# Patient Record
Sex: FEMALE | Race: WHITE | NOT HISPANIC OR LATINO | URBAN - METROPOLITAN AREA
[De-identification: names, ages, dates, MRNs, and addresses within clinical notes are randomized per-mention and may not be internally consistent; named-entity substitution may affect disease eponyms.]

---

## 2019-06-12 ENCOUNTER — OUTPATIENT (OUTPATIENT)
Dept: OUTPATIENT SERVICES | Facility: HOSPITAL | Age: 79
LOS: 1 days | End: 2019-06-12
Payer: MEDICARE

## 2019-06-12 VITALS
DIASTOLIC BLOOD PRESSURE: 72 MMHG | SYSTOLIC BLOOD PRESSURE: 140 MMHG | HEART RATE: 77 BPM | OXYGEN SATURATION: 98 % | RESPIRATION RATE: 16 BRPM | TEMPERATURE: 99 F | HEIGHT: 59 IN | WEIGHT: 117.95 LBS

## 2019-06-12 DIAGNOSIS — M19.90 UNSPECIFIED OSTEOARTHRITIS, UNSPECIFIED SITE: ICD-10-CM

## 2019-06-12 DIAGNOSIS — M18.11 UNILATERAL PRIMARY OSTEOARTHRITIS OF FIRST CARPOMETACARPAL JOINT, RIGHT HAND: ICD-10-CM

## 2019-06-12 DIAGNOSIS — D21.9 BENIGN NEOPLASM OF CONNECTIVE AND OTHER SOFT TISSUE, UNSPECIFIED: Chronic | ICD-10-CM

## 2019-06-12 LAB
ANION GAP SERPL CALC-SCNC: 12 MMO/L — SIGNIFICANT CHANGE UP (ref 7–14)
BUN SERPL-MCNC: 21 MG/DL — SIGNIFICANT CHANGE UP (ref 7–23)
CALCIUM SERPL-MCNC: 9.5 MG/DL — SIGNIFICANT CHANGE UP (ref 8.4–10.5)
CHLORIDE SERPL-SCNC: 104 MMOL/L — SIGNIFICANT CHANGE UP (ref 98–107)
CO2 SERPL-SCNC: 26 MMOL/L — SIGNIFICANT CHANGE UP (ref 22–31)
CREAT SERPL-MCNC: 0.62 MG/DL — SIGNIFICANT CHANGE UP (ref 0.5–1.3)
GLUCOSE SERPL-MCNC: 97 MG/DL — SIGNIFICANT CHANGE UP (ref 70–99)
HCT VFR BLD CALC: 42.7 % — SIGNIFICANT CHANGE UP (ref 34.5–45)
HGB BLD-MCNC: 14.2 G/DL — SIGNIFICANT CHANGE UP (ref 11.5–15.5)
MCHC RBC-ENTMCNC: 28.7 PG — SIGNIFICANT CHANGE UP (ref 27–34)
MCHC RBC-ENTMCNC: 33.3 % — SIGNIFICANT CHANGE UP (ref 32–36)
MCV RBC AUTO: 86.4 FL — SIGNIFICANT CHANGE UP (ref 80–100)
NRBC # FLD: 0 K/UL — SIGNIFICANT CHANGE UP (ref 0–0)
PLATELET # BLD AUTO: 255 K/UL — SIGNIFICANT CHANGE UP (ref 150–400)
PMV BLD: 10.6 FL — SIGNIFICANT CHANGE UP (ref 7–13)
POTASSIUM SERPL-MCNC: 4.3 MMOL/L — SIGNIFICANT CHANGE UP (ref 3.5–5.3)
POTASSIUM SERPL-SCNC: 4.3 MMOL/L — SIGNIFICANT CHANGE UP (ref 3.5–5.3)
RBC # BLD: 4.94 M/UL — SIGNIFICANT CHANGE UP (ref 3.8–5.2)
RBC # FLD: 13.4 % — SIGNIFICANT CHANGE UP (ref 10.3–14.5)
SODIUM SERPL-SCNC: 142 MMOL/L — SIGNIFICANT CHANGE UP (ref 135–145)
WBC # BLD: 6.71 K/UL — SIGNIFICANT CHANGE UP (ref 3.8–10.5)
WBC # FLD AUTO: 6.71 K/UL — SIGNIFICANT CHANGE UP (ref 3.8–10.5)

## 2019-06-12 PROCEDURE — 93010 ELECTROCARDIOGRAM REPORT: CPT

## 2019-06-12 NOTE — H&P PST ADULT - NSICDXPASTMEDICALHX_GEN_ALL_CORE_FT
PAST MEDICAL HISTORY:  Hearing loss bilateral -- wears aids    OA (osteoarthritis) first carpometacarpal joint of right hand PAST MEDICAL HISTORY:  Hearing loss bilateral -- wears aids    OA (osteoarthritis) first carpometacarpal joint of right hand    Overactive bladder

## 2019-06-12 NOTE — H&P PST ADULT - NSICDXPROBLEM_GEN_ALL_CORE_FT
PROBLEM DIAGNOSES  Problem: Osteoarthritis, chronic  Assessment and Plan: This is an 80 y/o female who is scheduled for right hand basal joint arthroplasty and scaphotrapeziotrapezoid debridement on 6-24-19  * Given pre op and cleanser instructions with good feed back and patient verbalized understanding  * Await medical evaluation with pcp as requested by surgeon PROBLEM DIAGNOSES  Problem: Osteoarthritis, chronic  Assessment and Plan: This is an 78 y/o female who is scheduled for right hand basal joint arthroplasty and scaphotrapeziotrapezoid debridement on 6-24-19  * Given pre op and cleanser instructions with good feed back and patient verbalized understanding  * Await medical evaluation with pcp as requested by surgeon  * Last aspirin on 6-17-19  * Instructed to take normal am dose of Vesicare the am of surgery

## 2019-06-12 NOTE — H&P PST ADULT - HISTORY OF PRESENT ILLNESS
This is an 80 y/o female who presents with This is an 80 y/o female who presents with progressively worsening symptomatology of OA of the right thumb confirmed on xrays. Scheduled for right hand basal joint arthroplasty and scaphotrapeziotrapezoid debridement on 6-24-19

## 2019-06-23 ENCOUNTER — TRANSCRIPTION ENCOUNTER (OUTPATIENT)
Age: 79
End: 2019-06-23

## 2019-06-24 ENCOUNTER — OUTPATIENT (OUTPATIENT)
Dept: OUTPATIENT SERVICES | Facility: HOSPITAL | Age: 79
LOS: 1 days | Discharge: HOME | End: 2019-06-24

## 2019-06-24 VITALS
SYSTOLIC BLOOD PRESSURE: 111 MMHG | TEMPERATURE: 98 F | DIASTOLIC BLOOD PRESSURE: 80 MMHG | HEART RATE: 74 BPM | OXYGEN SATURATION: 97 %

## 2019-06-24 VITALS
WEIGHT: 117.95 LBS | SYSTOLIC BLOOD PRESSURE: 134 MMHG | RESPIRATION RATE: 20 BRPM | OXYGEN SATURATION: 100 % | HEART RATE: 78 BPM | TEMPERATURE: 99 F | DIASTOLIC BLOOD PRESSURE: 71 MMHG | HEIGHT: 59 IN

## 2019-06-24 DIAGNOSIS — M18.11 UNILATERAL PRIMARY OSTEOARTHRITIS OF FIRST CARPOMETACARPAL JOINT, RIGHT HAND: ICD-10-CM

## 2019-06-24 DIAGNOSIS — D21.9 BENIGN NEOPLASM OF CONNECTIVE AND OTHER SOFT TISSUE, UNSPECIFIED: Chronic | ICD-10-CM

## 2019-06-24 RX ORDER — ONDANSETRON 8 MG/1
1 TABLET, FILM COATED ORAL
Qty: 10 | Refills: 0
Start: 2019-06-24

## 2019-06-24 RX ORDER — SODIUM CHLORIDE 9 MG/ML
1000 INJECTION, SOLUTION INTRAVENOUS
Refills: 0 | Status: DISCONTINUED | OUTPATIENT
Start: 2019-06-24 | End: 2019-07-09

## 2019-06-24 NOTE — ASU DISCHARGE PLAN (ADULT/PEDIATRIC) - CALL YOUR DOCTOR IF YOU HAVE ANY OF THE FOLLOWING:
Pain not relieved by Medications/Numbness, tingling, color or temperature change to extremity/Swelling that gets worse/Fever greater than (need to indicate Fahrenheit or Celsius)

## 2019-06-24 NOTE — ASU DISCHARGE PLAN (ADULT/PEDIATRIC) - NURSING INSTRUCTIONS
No fried, spicy or greasy foods. Increase fluids as tolerated  If your doctor prescribed narcotic pain medications after your procedure to help prevent and reduce constipation, increase fluid intake and fiber intake in your diet. You may take OTC Stool Softeners such as Colace to help reduce constipation.      refer to Dr. Morataya instruction sheet

## 2019-10-15 NOTE — H&P PST ADULT - NSANTHOSAYNRD_GEN_A_CORE
loss
No. JAZ screening performed.  STOP BANG Legend: 0-2 = LOW Risk; 3-4 = INTERMEDIATE Risk; 5-8 = HIGH Risk

## 2020-10-17 ENCOUNTER — TRANSCRIPTION ENCOUNTER (OUTPATIENT)
Age: 80
End: 2020-10-17

## 2020-10-17 ENCOUNTER — INPATIENT (INPATIENT)
Facility: HOSPITAL | Age: 80
LOS: 3 days | Discharge: NURSING, REHAB OR LONG-TERM CARE | End: 2020-10-21
Attending: ORTHOPAEDIC SURGERY | Admitting: ORTHOPAEDIC SURGERY
Payer: MEDICARE

## 2020-10-17 VITALS
HEIGHT: 59 IN | SYSTOLIC BLOOD PRESSURE: 160 MMHG | HEART RATE: 80 BPM | TEMPERATURE: 97 F | DIASTOLIC BLOOD PRESSURE: 78 MMHG | RESPIRATION RATE: 16 BRPM | OXYGEN SATURATION: 100 %

## 2020-10-17 DIAGNOSIS — S72.141A DISPLACED INTERTROCHANTERIC FRACTURE OF RIGHT FEMUR, INITIAL ENCOUNTER FOR CLOSED FRACTURE: ICD-10-CM

## 2020-10-17 DIAGNOSIS — Z01.818 ENCOUNTER FOR OTHER PREPROCEDURAL EXAMINATION: ICD-10-CM

## 2020-10-17 DIAGNOSIS — S72.143A DISPLACED INTERTROCHANTERIC FRACTURE OF UNSPECIFIED FEMUR, INITIAL ENCOUNTER FOR CLOSED FRACTURE: ICD-10-CM

## 2020-10-17 DIAGNOSIS — N32.81 OVERACTIVE BLADDER: ICD-10-CM

## 2020-10-17 DIAGNOSIS — E86.0 DEHYDRATION: ICD-10-CM

## 2020-10-17 DIAGNOSIS — M19.90 UNSPECIFIED OSTEOARTHRITIS, UNSPECIFIED SITE: ICD-10-CM

## 2020-10-17 DIAGNOSIS — D21.9 BENIGN NEOPLASM OF CONNECTIVE AND OTHER SOFT TISSUE, UNSPECIFIED: Chronic | ICD-10-CM

## 2020-10-17 PROBLEM — H91.90 UNSPECIFIED HEARING LOSS, UNSPECIFIED EAR: Chronic | Status: ACTIVE | Noted: 2019-06-12

## 2020-10-17 LAB
ALBUMIN SERPL ELPH-MCNC: 4.6 G/DL — SIGNIFICANT CHANGE UP (ref 3.3–5)
ALP SERPL-CCNC: 112 U/L — SIGNIFICANT CHANGE UP (ref 40–120)
ALT FLD-CCNC: 23 U/L — SIGNIFICANT CHANGE UP (ref 4–33)
ANION GAP SERPL CALC-SCNC: 15 MMO/L — HIGH (ref 7–14)
APPEARANCE UR: CLEAR — SIGNIFICANT CHANGE UP
APTT BLD: 27.5 SEC — SIGNIFICANT CHANGE UP (ref 27–36.3)
AST SERPL-CCNC: 24 U/L — SIGNIFICANT CHANGE UP (ref 4–32)
BASOPHILS # BLD AUTO: 0.04 K/UL — SIGNIFICANT CHANGE UP (ref 0–0.2)
BASOPHILS NFR BLD AUTO: 0.3 % — SIGNIFICANT CHANGE UP (ref 0–2)
BILIRUB SERPL-MCNC: 0.6 MG/DL — SIGNIFICANT CHANGE UP (ref 0.2–1.2)
BILIRUB UR-MCNC: NEGATIVE — SIGNIFICANT CHANGE UP
BLD GP AB SCN SERPL QL: NEGATIVE — SIGNIFICANT CHANGE UP
BLOOD UR QL VISUAL: NEGATIVE — SIGNIFICANT CHANGE UP
BUN SERPL-MCNC: 27 MG/DL — HIGH (ref 7–23)
CALCIUM SERPL-MCNC: 8.9 MG/DL — SIGNIFICANT CHANGE UP (ref 8.4–10.5)
CHLORIDE SERPL-SCNC: 103 MMOL/L — SIGNIFICANT CHANGE UP (ref 98–107)
CO2 SERPL-SCNC: 23 MMOL/L — SIGNIFICANT CHANGE UP (ref 22–31)
COLOR SPEC: SIGNIFICANT CHANGE UP
CREAT SERPL-MCNC: 0.67 MG/DL — SIGNIFICANT CHANGE UP (ref 0.5–1.3)
EOSINOPHIL # BLD AUTO: 0.06 K/UL — SIGNIFICANT CHANGE UP (ref 0–0.5)
EOSINOPHIL NFR BLD AUTO: 0.5 % — SIGNIFICANT CHANGE UP (ref 0–6)
GLUCOSE SERPL-MCNC: 155 MG/DL — HIGH (ref 70–99)
GLUCOSE UR-MCNC: NEGATIVE — SIGNIFICANT CHANGE UP
HCT VFR BLD CALC: 45.6 % — HIGH (ref 34.5–45)
HGB BLD-MCNC: 15.6 G/DL — HIGH (ref 11.5–15.5)
IMM GRANULOCYTES NFR BLD AUTO: 1.6 % — HIGH (ref 0–1.5)
INR BLD: 1.01 — SIGNIFICANT CHANGE UP (ref 0.88–1.16)
KETONES UR-MCNC: SIGNIFICANT CHANGE UP
LEUKOCYTE ESTERASE UR-ACNC: NEGATIVE — SIGNIFICANT CHANGE UP
LYMPHOCYTES # BLD AUTO: 1.58 K/UL — SIGNIFICANT CHANGE UP (ref 1–3.3)
LYMPHOCYTES # BLD AUTO: 13 % — SIGNIFICANT CHANGE UP (ref 13–44)
MCHC RBC-ENTMCNC: 30.1 PG — SIGNIFICANT CHANGE UP (ref 27–34)
MCHC RBC-ENTMCNC: 34.2 % — SIGNIFICANT CHANGE UP (ref 32–36)
MCV RBC AUTO: 87.9 FL — SIGNIFICANT CHANGE UP (ref 80–100)
MONOCYTES # BLD AUTO: 0.45 K/UL — SIGNIFICANT CHANGE UP (ref 0–0.9)
MONOCYTES NFR BLD AUTO: 3.7 % — SIGNIFICANT CHANGE UP (ref 2–14)
NEUTROPHILS # BLD AUTO: 9.8 K/UL — HIGH (ref 1.8–7.4)
NEUTROPHILS NFR BLD AUTO: 80.9 % — HIGH (ref 43–77)
NITRITE UR-MCNC: NEGATIVE — SIGNIFICANT CHANGE UP
NRBC # FLD: 0 K/UL — SIGNIFICANT CHANGE UP (ref 0–0)
PH UR: 6 — SIGNIFICANT CHANGE UP (ref 5–8)
PLATELET # BLD AUTO: 237 K/UL — SIGNIFICANT CHANGE UP (ref 150–400)
PMV BLD: 10.5 FL — SIGNIFICANT CHANGE UP (ref 7–13)
POTASSIUM SERPL-MCNC: 4 MMOL/L — SIGNIFICANT CHANGE UP (ref 3.5–5.3)
POTASSIUM SERPL-SCNC: 4 MMOL/L — SIGNIFICANT CHANGE UP (ref 3.5–5.3)
PROT SERPL-MCNC: 6.8 G/DL — SIGNIFICANT CHANGE UP (ref 6–8.3)
PROT UR-MCNC: NEGATIVE — SIGNIFICANT CHANGE UP
PROTHROM AB SERPL-ACNC: 11.6 SEC — SIGNIFICANT CHANGE UP (ref 10.6–13.6)
RBC # BLD: 5.19 M/UL — SIGNIFICANT CHANGE UP (ref 3.8–5.2)
RBC # FLD: 13.1 % — SIGNIFICANT CHANGE UP (ref 10.3–14.5)
RH IG SCN BLD-IMP: POSITIVE — SIGNIFICANT CHANGE UP
SARS-COV-2 RNA SPEC QL NAA+PROBE: SIGNIFICANT CHANGE UP
SODIUM SERPL-SCNC: 141 MMOL/L — SIGNIFICANT CHANGE UP (ref 135–145)
SP GR SPEC: 1.02 — SIGNIFICANT CHANGE UP (ref 1–1.04)
UROBILINOGEN FLD QL: NORMAL — SIGNIFICANT CHANGE UP
WBC # BLD: 12.13 K/UL — HIGH (ref 3.8–10.5)
WBC # FLD AUTO: 12.13 K/UL — HIGH (ref 3.8–10.5)

## 2020-10-17 PROCEDURE — 71045 X-RAY EXAM CHEST 1 VIEW: CPT | Mod: 26

## 2020-10-17 PROCEDURE — 73560 X-RAY EXAM OF KNEE 1 OR 2: CPT | Mod: 26,RT

## 2020-10-17 PROCEDURE — 73502 X-RAY EXAM HIP UNI 2-3 VIEWS: CPT | Mod: 26,RT

## 2020-10-17 PROCEDURE — 99223 1ST HOSP IP/OBS HIGH 75: CPT

## 2020-10-17 PROCEDURE — 73501 X-RAY EXAM HIP UNI 1 VIEW: CPT | Mod: 26,59,RT

## 2020-10-17 PROCEDURE — 99284 EMERGENCY DEPT VISIT MOD MDM: CPT

## 2020-10-17 PROCEDURE — 73552 X-RAY EXAM OF FEMUR 2/>: CPT | Mod: 26,RT

## 2020-10-17 RX ORDER — ATORVASTATIN CALCIUM 80 MG/1
40 TABLET, FILM COATED ORAL AT BEDTIME
Refills: 0 | Status: DISCONTINUED | OUTPATIENT
Start: 2020-10-17 | End: 2020-10-21

## 2020-10-17 RX ORDER — MORPHINE SULFATE 50 MG/1
4 CAPSULE, EXTENDED RELEASE ORAL ONCE
Refills: 0 | Status: DISCONTINUED | OUTPATIENT
Start: 2020-10-17 | End: 2020-10-17

## 2020-10-17 RX ORDER — HEPARIN SODIUM 5000 [USP'U]/ML
5000 INJECTION INTRAVENOUS; SUBCUTANEOUS ONCE
Refills: 0 | Status: COMPLETED | OUTPATIENT
Start: 2020-10-17 | End: 2020-10-17

## 2020-10-17 RX ORDER — OXYCODONE HYDROCHLORIDE 5 MG/1
2.5 TABLET ORAL EVERY 4 HOURS
Refills: 0 | Status: DISCONTINUED | OUTPATIENT
Start: 2020-10-17 | End: 2020-10-21

## 2020-10-17 RX ORDER — OXYCODONE HYDROCHLORIDE 5 MG/1
5 TABLET ORAL EVERY 4 HOURS
Refills: 0 | Status: DISCONTINUED | OUTPATIENT
Start: 2020-10-17 | End: 2020-10-21

## 2020-10-17 RX ORDER — SENNA PLUS 8.6 MG/1
2 TABLET ORAL AT BEDTIME
Refills: 0 | Status: DISCONTINUED | OUTPATIENT
Start: 2020-10-17 | End: 2020-10-21

## 2020-10-17 RX ORDER — PANTOPRAZOLE SODIUM 20 MG/1
40 TABLET, DELAYED RELEASE ORAL
Refills: 0 | Status: DISCONTINUED | OUTPATIENT
Start: 2020-10-17 | End: 2020-10-21

## 2020-10-17 RX ORDER — ACETAMINOPHEN 500 MG
975 TABLET ORAL EVERY 8 HOURS
Refills: 0 | Status: DISCONTINUED | OUTPATIENT
Start: 2020-10-17 | End: 2020-10-21

## 2020-10-17 RX ORDER — SODIUM CHLORIDE 9 MG/ML
1000 INJECTION INTRAMUSCULAR; INTRAVENOUS; SUBCUTANEOUS
Refills: 0 | Status: DISCONTINUED | OUTPATIENT
Start: 2020-10-17 | End: 2020-10-18

## 2020-10-17 RX ORDER — HEPARIN SODIUM 5000 [USP'U]/ML
5000 INJECTION INTRAVENOUS; SUBCUTANEOUS ONCE
Refills: 0 | Status: DISCONTINUED | OUTPATIENT
Start: 2020-10-17 | End: 2020-10-17

## 2020-10-17 RX ORDER — TETANUS TOXOID, REDUCED DIPHTHERIA TOXOID AND ACELLULAR PERTUSSIS VACCINE, ADSORBED 5; 2.5; 8; 8; 2.5 [IU]/.5ML; [IU]/.5ML; UG/.5ML; UG/.5ML; UG/.5ML
0.5 SUSPENSION INTRAMUSCULAR ONCE
Refills: 0 | Status: COMPLETED | OUTPATIENT
Start: 2020-10-17 | End: 2020-10-17

## 2020-10-17 RX ORDER — OXYBUTYNIN CHLORIDE 5 MG
5 TABLET ORAL
Refills: 0 | Status: DISCONTINUED | OUTPATIENT
Start: 2020-10-17 | End: 2020-10-18

## 2020-10-17 RX ORDER — MAGNESIUM HYDROXIDE 400 MG/1
30 TABLET, CHEWABLE ORAL DAILY
Refills: 0 | Status: DISCONTINUED | OUTPATIENT
Start: 2020-10-17 | End: 2020-10-21

## 2020-10-17 RX ORDER — HYDROMORPHONE HYDROCHLORIDE 2 MG/ML
0.5 INJECTION INTRAMUSCULAR; INTRAVENOUS; SUBCUTANEOUS ONCE
Refills: 0 | Status: DISCONTINUED | OUTPATIENT
Start: 2020-10-17 | End: 2020-10-17

## 2020-10-17 RX ADMIN — HEPARIN SODIUM 5000 UNIT(S): 5000 INJECTION INTRAVENOUS; SUBCUTANEOUS at 22:41

## 2020-10-17 RX ADMIN — SENNA PLUS 2 TABLET(S): 8.6 TABLET ORAL at 22:41

## 2020-10-17 RX ADMIN — TETANUS TOXOID, REDUCED DIPHTHERIA TOXOID AND ACELLULAR PERTUSSIS VACCINE, ADSORBED 0.5 MILLILITER(S): 5; 2.5; 8; 8; 2.5 SUSPENSION INTRAMUSCULAR at 17:44

## 2020-10-17 RX ADMIN — OXYCODONE HYDROCHLORIDE 2.5 MILLIGRAM(S): 5 TABLET ORAL at 21:04

## 2020-10-17 RX ADMIN — Medication 975 MILLIGRAM(S): at 22:42

## 2020-10-17 RX ADMIN — SODIUM CHLORIDE 125 MILLILITER(S): 9 INJECTION INTRAMUSCULAR; INTRAVENOUS; SUBCUTANEOUS at 22:41

## 2020-10-17 RX ADMIN — ATORVASTATIN CALCIUM 40 MILLIGRAM(S): 80 TABLET, FILM COATED ORAL at 22:42

## 2020-10-17 RX ADMIN — MORPHINE SULFATE 4 MILLIGRAM(S): 50 CAPSULE, EXTENDED RELEASE ORAL at 17:30

## 2020-10-17 RX ADMIN — HYDROMORPHONE HYDROCHLORIDE 0.5 MILLIGRAM(S): 2 INJECTION INTRAMUSCULAR; INTRAVENOUS; SUBCUTANEOUS at 23:49

## 2020-10-17 NOTE — CONSULT NOTE ADULT - PROBLEM SELECTOR RECOMMENDATION 4
- with surgery to first carpometacarpal joint of right hand  - on Vitamin D supplement - with surgery to first carpometacarpal joint of right hand  - x-rays demonstrating osteopenia  - on MVI, Vitamin D supplements  - could give calcium supplement  - fall precautions - WBC = 12.13  - no other signs/symptoms suggestive of infection  - likely due to stress demargination  - f/u trend w/ repeat lab-work

## 2020-10-17 NOTE — ED ADULT TRIAGE NOTE - CHIEF COMPLAINT QUOTE
mechanical trip and fall at front of house with fall to right side and hip. Non-ambulatory at scene. Denies CP/SOB. Denies blood thinner use. Hx Denies Med hx mechanical trip and fall at front of house with fall to right side and hip. Non-ambulatory at scene. Denies CP/SOB. Denies blood thinner use. Shortening observed to right leg.  Hx Denies Med hx

## 2020-10-17 NOTE — ED PROVIDER NOTE - PROGRESS NOTE DETAILS
Donte GARNETT (PGY-2): Patient signed out to me, pending Ortho to see, patient's pain well controlled, will continue to monitor, COVID sent.

## 2020-10-17 NOTE — CONSULT NOTE ADULT - PROBLEM SELECTOR RECOMMENDATION 3
- BUN/Cr = 27/0.67; ratio of 40:1  - also with "small" ketonuria  - ensure optimal hydration; in progress  - f/u repeat lab-work - BUN/Cr = 27/0.67; ratio of 40:1  - also with "small" ketonuria and lab-work appears hemoconcentrated  - ensure optimal hydration; in progress  - f/u repeat lab-work

## 2020-10-17 NOTE — CONSULT NOTE ADULT - ASSESSMENT
[ x ]  Lab studies reviewed  [ x ]  Radiology reviewed  [  ]  Old records reviewed    Medical evaluation of this 80 year old female, with past history as above, who is being scheduled for surgical intervention of the right intertrochanteric fracture. [ x ]  Lab studies reviewed  [ x ]  Radiology reviewed  [ x ]  Old records reviewed    Medical evaluation of this 80 year old female, with past history as above, who is being scheduled for surgical intervention of the right intertrochanteric fracture.

## 2020-10-17 NOTE — ED ADULT NURSE REASSESSMENT NOTE - NS ED NURSE REASSESS COMMENT FT1
14 Tajik Boudreaux placed using sterile technique. Pt tolerated, well, urine draining to collection bag. Pt received pain medication as ordered. Pt is hard of hearing.

## 2020-10-17 NOTE — H&P ADULT - HISTORY OF PRESENT ILLNESS
80y Female presents to [facility] ED s/p mech fall while leaving her house today.  She immediately c/o severe R hip pain and inability to ambulate.  Patient denies headstrike or LOC. Localizes pain to R hip/femur. Patient denies radiation of pain. Patient denies numbness/tingling/burning in the RLE. No other bone/joint complaints. Patient is a community ambulator at baseline without assistive devices. Patient lives alone, has no issues w/ ADLs/IADLs.     PAST MEDICAL & SURGICAL HISTORY:  Overactive bladder    OA (osteoarthritis)  first carpometacarpal joint of right hand    Hearing loss  bilateral -- wears aids    Fibroids  1990  hysterectomy      MEDICATIONS  (STANDING):    MEDICATIONS  (PRN):    Allergies    penicillin (Other)    Intolerances      T(C): 36.2 (10-17-20 @ 16:12), Max: 36.2 (10-17-20 @ 16:12)  HR: 80 (10-17-20 @ 16:12) (80 - 80)  BP: 160/78 (10-17-20 @ 16:12) (160/78 - 160/78)  RR: 16 (10-17-20 @ 16:12) (16 - 16)  SpO2: 100% (10-17-20 @ 16:12) (100% - 100%)  Wt(kg): --    PE   Gen: NAD, alert and oriented x3  RLE:  Skin intact; No ecchymosis/soft tissue swelling  Compartments soft; + TTP about hip. No TTP to knee/leg/ankle/foot  ROM lmited 2/2 pain   Unable to SLR; + Log Roll/Heel Strike  Motor intact GS/TA/FHL/EHL  SILT L2-S1  DP/PT pulses 2+    LLE/BUE:   No bony TTP; Good ROM w/o pain; Exam Unremarkable    Imaging:  XR demonstrating R transcervical femoral neck fracture    80y Female with R femoral neck fracture    - Pain control  - NPO/IVF at midnight  - primafit catheter  - CBC/BMP/Coags/UA/T+S x2  - EKG/CXR  - Medical clearance  - Plan for OR for R hip hemiarthroplasty tomorrow vs Monday

## 2020-10-17 NOTE — ED ADULT NURSE REASSESSMENT NOTE - NS ED NURSE REASSESS COMMENT FT1
received report from  RN. Pt is a/o x 3. no complaints of chest pain, headache, nausea, dizziness, vomiting, SOB, fever, chills   verbalized. Pt has iv placedno redness or swelling noted. Awaiting further orders. Will continue to monitor.

## 2020-10-17 NOTE — ED PROVIDER NOTE - OBJECTIVE STATEMENT
81yo female with pmh arthritis presenting with two falls, R hip pain.  Patient states she had fallen in the bathroom this morning after slipping on the mat.  Later today around 230 fell while entering her house landing on her right side.  No prior falls.  Denies head injury.  No ac.  Only pain right hip/ thigh.  Was ambulatory without complaints after initial fall but now unable to walk 2/2 pain.  No fevers, cough, shortness fo breath, chest pain, lightheadedness, loc, abdominal pain, headache, neck pain, back pain, numbness.

## 2020-10-17 NOTE — CONSULT NOTE ADULT - PROBLEM SELECTOR RECOMMENDATION 6
- uses Nexium occasionally  - continues on PPI  - HOB elevation - no acute issues presently  - continue on Ditropan  - now w/ Boudreaux catheter, in the setting of hip fracture w/ difficulty in movement due to severe pain; management as per primary team

## 2020-10-17 NOTE — ED PROVIDER NOTE - ADMIT DISPOSITION PRESENT ON ADMISSION SEPSIS
No

## 2020-10-17 NOTE — CONSULT NOTE ADULT - SUBJECTIVE AND OBJECTIVE BOX
Medical evaluation prior to surgical intervention of this 80 year old female who presented with pain of the right hip - s/p falls.  Past history significant for Osteoarthritis, Overactive bladder, Hearing loss (B/L), and Hysterectomy.  Per reports, patient slipped and fell in the bathroom during the morning; no significant trauma.  However, while entering her home later during the day, patient fell again and struck the right hip; unable to ambulate thereafter.    Vital signs in ED upon ED presentation as follows: BP = 160/78, HR = 80, RR = 16, T = 36.2 C (97.2 F), O2 Sat = 100% on RA.  Diagnosed with Intertrochanteric Fracture.  Admitted to Orthopedic Surgery.    PAST MEDICAL & SURGICAL HISTORY:  ·	Hearing loss; bilateral -- wears aids  ·	Overactive bladder  ·	OA (osteoarthritis); first carpometacarpal joint of right hand  ·	Fibroids  ·	Hysterectomy - 1990    FAMILY HISTORY:  ·	No pertinent family history in first degree relatives    SOCIAL HISTORY:    ·	Marital Status:  (  )    (  ) Single        (  )        (  )        (  )   ·	Occupation:   ·	Lives with:        (  ) Alone       (  ) Children     (  ) Spouse          (  ) Parents          (  ) Other  ·	  ·	No history of smoking  ·	No history of alcohol abuse  ·	No history of illegal drug use    MEDICATIONS  (STANDING):  ·	acetaminophen   Tablet .. 975 milliGRAM(s) Oral every 8 hours  ·	atorvastatin 40 milliGRAM(s) Oral at bedtime  ·	heparin   Injectable 5000 Unit(s) SubCutaneous once  ·	oxybutynin 5 milliGRAM(s) Oral two times a day  ·	pantoprazole    Tablet 40 milliGRAM(s) Oral before breakfast  ·	senna 2 Tablet(s) Oral at bedtime  ·	sodium chloride 0.9%. 1000 milliLiter(s) (125 mL/Hr) IV Continuous <Continuous>    MEDICATIONS  (PRN):  ·	magnesium hydroxide Suspension 30 milliLiter(s) Oral daily PRN Constipation  ·	oxyCODONE    IR 2.5 milliGRAM(s) Oral every 4 hours PRN Moderate Pain (4 - 6)  ·	oxyCODONE    IR 5 milliGRAM(s) Oral every 4 hours PRN Severe Pain (7 - 10)    ALLERGIES/INTOLERANCES:  ·	penicillin (Other)  ·	Intolerances    REVIEW OF SYSTEMS:    CONSTITUTIONAL: No weakness, fever or chills  EYES/ENT: No visual changes.  No dysphagia  NECK: No pain or stiffness  RESPIRATORY: No cough or hemoptysis.  No shortness of breath  CARDIOVASCULAR: No chest pain or palpitation.  No lower extremity edema  GASTROINTESTINAL: No abdominal or epigastric pain. No nausea, vomiting or hematemesis.  No diarrhea or constipation. No melena or hematochezia.  GENITOURINARY: No dysuria, frequency or hematuria  MUSCULOSKELETAL: No joint pain, swelling, decreased ROM, erythema, warmth  NEUROLOGICAL: No numbness or weakness  PSYCHIATRY: No anxiety, or depression.  SKIN: No itching, burning, rashes, or lesions   All other review of systems is negative unless indicated above.      LAB-WORK/STUDIES:               15.6   12.13 )-----------( 237      ( 17 Oct 2020 16:53 )             45.6     17 Oct 2020 16:53    141    |  103    |  27     ----------------------------<  155    4.0     |  23     |  0.67     Ca    8.9        17 Oct 2020 16:53    TPro  6.8    /  Alb  4.6    /  TBili  0.6    /  DBili  x      /  AST  24     /  ALT  23     /  AlkPhos  112    17 Oct 2020 16:53    LIVER FUNCTIONS - ( 17 Oct 2020 16:53 )  Alb: 4.6 g/dL / Pro: 6.8 g/dL / ALK PHOS: 112 u/L / ALT: 23 u/L / AST: 24 u/L / GGT: x           PT/INR - ( 17 Oct 2020 16:53 )   PT: 11.6 SEC;   INR: 1.01          PTT - ( 17 Oct 2020 16:53 )  PTT:27.5 SEC  CAPILLARY BLOOD GLUCOSE    =======================================================        =======================================================    Vital Signs Last 24 Hrs  T(C): 36.2 (17 Oct 2020 16:12), Max: 36.2 (17 Oct 2020 16:12)  T(F): 97.2 (17 Oct 2020 16:12), Max: 97.2 (17 Oct 2020 16:12)  HR: 80 (17 Oct 2020 16:12) (80 - 80)  BP: 160/78 (17 Oct 2020 16:12) (160/78 - 160/78)  BP(mean): --  RR: 16 (17 Oct 2020 16:12) (16 - 16)  SpO2: 100% (17 Oct 2020 16:12) (100% - 100%)    PHYSICAL EXAMINATION:  APPEARANCE: Well groomed, adequately nourished.  NAD	  HEENT: Moist oral mucosa, PERRL, EOMI	  LYMPHATIC: No lymphadenopathy appreciated  CARDIOVASCULAR: (+) S1 S2.  No JVD.  No murmurs.  No edema  RESPIRATORY: No wheezing, rhonchi, crackles appreciated  PSYCHIATRIC: A&O x 3.  Mood & affect appropriate to situation  GASTROINTESTINAL:  Soft, Non-tender, + BS	  SKIN: No rashes. No ecchymoses.  No cyanosis	  NEUROLOGICAL: Non-focal, A&Ox3, PIERCE x 4 against gravity  EXTREMITIES: Normal range of motion.  No clubbing, cyanosis or edema  VASCULAR: Peripheral pulses palpable 2+ bilaterally                   Medical evaluation prior to surgical intervention of this 80 year old female who presented with pain of the right hip - s/p falls.  Past history significant for Osteoarthritis, Overactive bladder, Hearing loss (B/L), and Hysterectomy.  Per reports, patient slipped and fell in the bathroom during the morning; no significant trauma.  However, while entering her home later during the day, patient fell again and struck the right hip; unable to ambulate thereafter.    Vital signs in ED upon ED presentation as follows: BP = 160/78, HR = 80, RR = 16, T = 36.2 C (97.2 F), O2 Sat = 100% on RA.  Diagnosed with Intertrochanteric Fracture.  Admitted to Orthopedic Surgery.    PAST MEDICAL & SURGICAL HISTORY:  ·	Hearing loss; bilateral -- wears aids  ·	Overactive bladder  ·	OA (osteoarthritis); first carpometacarpal joint of right hand  ·	Fibroids  ·	Hysterectomy - 1990    FAMILY HISTORY:  ·	No pertinent family history in first degree relatives    SOCIAL HISTORY:    ·	Marital Status:  (  )    (  ) Single        (  )        (  )        ( x )   ·	Occupation:   ·	Lives with:        ( x ) Alone       (  ) Children     (  ) Spouse          (  ) Parents          (  ) Other  ·	  ·	No history of smoking  ·	No history of alcohol abuse  ·	No history of illegal drug use    MEDICATIONS  (STANDING):  ·	acetaminophen   Tablet .. 975 milliGRAM(s) Oral every 8 hours  ·	atorvastatin 40 milliGRAM(s) Oral at bedtime  ·	heparin   Injectable 5000 Unit(s) SubCutaneous once  ·	oxybutynin 5 milliGRAM(s) Oral two times a day  ·	pantoprazole    Tablet 40 milliGRAM(s) Oral before breakfast  ·	senna 2 Tablet(s) Oral at bedtime  ·	sodium chloride 0.9%. 1000 milliLiter(s) (125 mL/Hr) IV Continuous <Continuous>    MEDICATIONS  (PRN):  ·	magnesium hydroxide Suspension 30 milliLiter(s) Oral daily PRN Constipation  ·	oxyCODONE    IR 2.5 milliGRAM(s) Oral every 4 hours PRN Moderate Pain (4 - 6)  ·	oxyCODONE    IR 5 milliGRAM(s) Oral every 4 hours PRN Severe Pain (7 - 10)    ALLERGIES/INTOLERANCES:  ·	penicillin (Other)  ·	Intolerances    REVIEW OF SYSTEMS:    CONSTITUTIONAL: No weakness, fever or chills  EYES/ENT: No visual changes.  No dysphagia  NECK: No pain or stiffness  RESPIRATORY: No cough or hemoptysis.  No shortness of breath  CARDIOVASCULAR: No chest pain or palpitation.  No lower extremity edema  GASTROINTESTINAL: No abdominal or epigastric pain. No nausea, vomiting or hematemesis.  No diarrhea or constipation. No melena or hematochezia.  GENITOURINARY: No dysuria, frequency or hematuria  MUSCULOSKELETAL: No joint pain, swelling, decreased ROM, erythema, warmth  NEUROLOGICAL: No numbness or weakness  PSYCHIATRY: No anxiety, or depression.  SKIN: No itching, burning, rashes, or lesions   All other review of systems is negative unless indicated above.      LAB-WORK/STUDIES:               15.6   12.13 )-----------( 237      ( 17 Oct 2020 16:53 )             45.6     17 Oct 2020 16:53    141    |  103    |  27     ----------------------------<  155    4.0     |  23     |  0.67     Ca    8.9        17 Oct 2020 16:53    TPro  6.8    /  Alb  4.6    /  TBili  0.6    /  DBili  x      /  AST  24     /  ALT  23     /  AlkPhos  112    17 Oct 2020 16:53    LIVER FUNCTIONS - ( 17 Oct 2020 16:53 )  Alb: 4.6 g/dL / Pro: 6.8 g/dL / ALK PHOS: 112 u/L / ALT: 23 u/L / AST: 24 u/L / GGT: x           PT/INR - ( 17 Oct 2020 16:53 )   PT: 11.6 SEC;   INR: 1.01          PTT - ( 17 Oct 2020 16:53 )  PTT:27.5 SEC  CAPILLARY BLOOD GLUCOSE    =======================================================        =======================================================    Vital Signs Last 24 Hrs  T(C): 36.2 (17 Oct 2020 16:12), Max: 36.2 (17 Oct 2020 16:12)  T(F): 97.2 (17 Oct 2020 16:12), Max: 97.2 (17 Oct 2020 16:12)  HR: 80 (17 Oct 2020 16:12) (80 - 80)  BP: 160/78 (17 Oct 2020 16:12) (160/78 - 160/78)  BP(mean): --  RR: 16 (17 Oct 2020 16:12) (16 - 16)  SpO2: 100% (17 Oct 2020 16:12) (100% - 100%)    PHYSICAL EXAMINATION:  APPEARANCE: Well groomed, adequately nourished.  NAD	  HEENT: Moist oral mucosa, PERRL, EOMI	  LYMPHATIC: No lymphadenopathy appreciated  CARDIOVASCULAR: (+) S1 S2.  No JVD.  No murmurs.  No edema  RESPIRATORY: No wheezing, rhonchi, crackles appreciated  PSYCHIATRIC: A&O x 3.  Mood & affect appropriate to situation  GASTROINTESTINAL:  Soft, Non-tender, + BS	  SKIN: No rashes. No ecchymoses.  No cyanosis	  NEUROLOGICAL: Non-focal, A&Ox3, PIERCE x 4 against gravity  EXTREMITIES: Normal range of motion.  No clubbing, cyanosis or edema  VASCULAR: Peripheral pulses palpable 2+ bilaterally                   Medical evaluation prior to surgical intervention of this 80 year old female who presented with pain of the right hip - s/p falls.  Past history significant for Osteoarthritis, Overactive bladder, Hearing loss (B/L), and Hysterectomy.  Seen and evaluated at bedside with family (son, daughter, grandson), present.  Patient reports slipping and falling in the bathroom during the morning; no significant trauma and went back to bed for a while.  Later during the day, while reentering her home, in the company of her son, patient tripped and lost balance, landing on her right hip; unable to ambulate thereafter.  Son witnessed the event and was able to corroborate her recollection of events.  No head trauma or loss of consciousness.  No associated headaches, dizziness, giddiness, lightheadedness, blurred vision, chest pain, palpitations, shortness of breath, or any other related signs/symptoms surrounding the fall.    At baseline, patient is independently ambulatory and is able to climb flights of stairs independently and without difficulty; bedrooms at home are upstairs.  Independent it activities of daily living.    Vital signs in ED upon ED presentation as follows: BP = 160/78, HR = 80, RR = 16, T = 36.2 C (97.2 F), O2 Sat = 100% on RA.  Diagnosed with Intertrochanteric Fracture.  Admitted to Orthopedic Surgery.    PAST MEDICAL & SURGICAL HISTORY:  ·	Hearing loss; bilateral -- wears aids  ·	Overactive bladder  ·	OA (osteoarthritis); first carpometacarpal joint of right hand  ·	Fibroids  ·	Cataract surgery (right) - 09/2019  ·	Hysterectomy - 1990  ·	Right hand basal joint arthroscopy - 06/2019    FAMILY HISTORY:  ·	No pertinent family history in first degree relatives    SOCIAL HISTORY:    ·	Marital Status:  (  )    (  ) Single        (  )        (  )        ( x )   ·	Occupation:      Retired  ·	Lives with:        ( x ) Alone       (  ) Children     (  ) Spouse          (  ) Parents          (  ) Other  ·	  ·	No history of smoking  ·	No history of alcohol abuse  ·	No history of illegal drug use    MEDICATIONS  (STANDING):  ·	acetaminophen   Tablet .. 975 milliGRAM(s) Oral every 8 hours  ·	atorvastatin 40 milliGRAM(s) Oral at bedtime  ·	heparin   Injectable 5000 Unit(s) SubCutaneous once  ·	oxybutynin 5 milliGRAM(s) Oral two times a day  ·	pantoprazole    Tablet 40 milliGRAM(s) Oral before breakfast  ·	senna 2 Tablet(s) Oral at bedtime  ·	sodium chloride 0.9%. 1000 milliLiter(s) (125 mL/Hr) IV Continuous <Continuous>    MEDICATIONS  (PRN):  ·	magnesium hydroxide Suspension 30 milliLiter(s) Oral daily PRN Constipation  ·	oxyCODONE    IR 2.5 milliGRAM(s) Oral every 4 hours PRN Moderate Pain (4 - 6)  ·	oxyCODONE    IR 5 milliGRAM(s) Oral every 4 hours PRN Severe Pain (7 - 10)    HOME MEDICATIONS:  ·	aspirin 81 mg oral tablet: 1 tab(s) orally once a day (last dose 6/17/19) (24 Jun 2019 07:58)  ·	atorvastatin 40 mg oral tablet: 1 tab(s) orally once a day (24 Jun 2019 07:58)  ·	NexIUM: otc 1 tab daily (24 Jun 2019 07:58)  ·	spectrum vitamins: 1 tab(s) orally once a day (last dose 6/17/19 (24 Jun 2019 07:58)  ·	VESIcare 5 mg oral tablet: 1 tab(s) orally once a day (24 Jun 2019 07:58)  ·	multivitamin  ·	Co-Q-10  ·	vitamin B12  ·	vtamin D3    ALLERGIES/INTOLERANCES:  ·	penicillin (Other)  ·	Intolerances    REVIEW OF SYSTEMS:  ·	CONSTITUTIONAL: No weakness, fever or chills  ·	EYES/ENT: No visual changes.  R-cataract surgery one year ago  No dysphagia  ·	NECK: No pain or stiffness  ·	RESPIRATORY: No cough or hemoptysis.  No shortness of breath  ·	CARDIOVASCULAR: No chest pain or palpitation.  No lower extremity edema  ·	GASTROINTESTINAL: No abdominal or epigastric pain. No nausea, vomiting or hematemesis.  No diarrhea or constipation. No melena or hematochezia.  ·	GENITOURINARY: No dysuria, frequency or hematuria  ·	MUSCULOSKELETAL: Pain of the right hip - worsens with movement.  No increased warmth or erythema  ·	NEUROLOGICAL: No numbness or weakness  ·	PSYCHIATRY: No anxiety, or depression.  ·	SKIN: No itching, burning, rashes, or lesions   ·	All other review of systems is negative unless indicated above.    LAB-WORK/STUDIES:               15.6   12.13 )-----------( 237      ( 17 Oct 2020 16:53 )             45.6     17 Oct 2020 16:53    141    |  103    |  27     ----------------------------<  155    4.0     |  23     |  0.67     Ca    8.9        17 Oct 2020 16:53    TPro  6.8    /  Alb  4.6    /  TBili  0.6    /  DBili  x      /  AST  24     /  ALT  23     /  AlkPhos  112    17 Oct 2020 16:53    LIVER FUNCTIONS - ( 17 Oct 2020 16:53 )  Alb: 4.6 g/dL / Pro: 6.8 g/dL / ALK PHOS: 112 u/L / ALT: 23 u/L / AST: 24 u/L / GGT: x           PT/INR - ( 17 Oct 2020 16:53 )   PT: 11.6 SEC;   INR: 1.01          PTT - ( 17 Oct 2020 16:53 )  PTT:27.5 SEC  CAPILLARY BLOOD GLUCOSE    =======================================================    ECG = NSR w/ sinus arrhythmia at 81 bpm, QTc = 441    =======================================================    Vital Signs Last 24 Hrs  T(C): 36.2 (17 Oct 2020 16:12), Max: 36.2 (17 Oct 2020 16:12)  T(F): 97.2 (17 Oct 2020 16:12), Max: 97.2 (17 Oct 2020 16:12)  HR: 80 (17 Oct 2020 16:12) (80 - 80)  BP: 160/78 (17 Oct 2020 16:12) (160/78 - 160/78)  BP(mean): --  RR: 16 (17 Oct 2020 16:12) (16 - 16)  SpO2: 100% (17 Oct 2020 16:12) (100% - 100%)    PHYSICAL EXAMINATION:  ·	APPEARANCE: Well groomed, slim female, lying supine in bed in NAD.  Appears a bit worried	  ·	HEENT: Moist oral mucosa, PERRL, EOMI  ·	LYMPHATIC: No lymphadenopathy appreciated  ·	CARDIOVASCULAR: (+) S1 S2.  No JVD.  No murmurs appreciated.  No edema  ·	RESPIRATORY: No wheezing, rhonchi, crackles appreciated  ·	PSYCHIATRIC: A&O x 3.  Mood & affect appropriate to situation  ·	GASTROINTESTINAL:  Soft, Non-tender, + BS	  ·	SKIN: No rashes. No ecchymoses.  No cyanosis	  ·	NEUROLOGICAL: Non-focal, A&Ox3, PIERCE x 4 against gravity  ·	EXTREMITIES: Decreased ROM of the RLE at the hip; held in guarded position and slightly externally rotated.  Mild tenderness over hip area (s/p morphine earlier).  Has R-hand support in place due to past surgery of right thumb.  No clubbing, cyanosis or edema  ·	VASCULAR: Peripheral pulses palpable 2+ bilaterally   Medical evaluation prior to surgical intervention of this 80 year old female who presented with pain of the right hip - s/p falls.  Past history significant for Osteoarthritis, Overactive bladder, Hearing loss (B/L), and Hysterectomy.  Seen and evaluated at bedside with family (son, daughter, grandson), present.  Patient reports slipping and falling in the bathroom during the morning; no significant trauma and went back to bed for a while.  Later during the day, while reentering her home, in the company of her son, patient tripped and lost balance, landing on her right hip; unable to ambulate thereafter.  Son witnessed the event and was able to corroborate her recollection of events.  No head trauma or loss of consciousness.  No associated headaches, dizziness, giddiness, lightheadedness, blurred vision, chest pain, palpitations, shortness of breath, or any other related signs/symptoms surrounding the fall.    At baseline, patient is independently ambulatory and is able to climb flights of stairs independently and without difficulty; bedrooms at home are upstairs.  Independent it activities of daily living.    Vital signs in ED upon ED presentation as follows: BP = 160/78, HR = 80, RR = 16, T = 36.2 C (97.2 F), O2 Sat = 100% on RA.  Diagnosed with Intertrochanteric Fracture.  Admitted to Orthopedic Surgery.    PAST MEDICAL & SURGICAL HISTORY:  ·	Acid reflux  ·	Hearing loss; bilateral -- wears aids  ·	Overactive bladder  ·	OA (osteoarthritis); first carpometacarpal joint of right hand  ·	Fibroids  ·	Cataract surgery (right) - 09/2019  ·	Hysterectomy - 1990  ·	Right hand basal joint arthroscopy - 06/2019    FAMILY HISTORY:  ·	No pertinent family history in first degree relatives    SOCIAL HISTORY:    ·	Marital Status:  (  )    (  ) Single        (  )        (  )        ( x )   ·	Occupation:      Retired  ·	Lives with:        ( x ) Alone       (  ) Children     (  ) Spouse          (  ) Parents          (  ) Other  ·	  ·	No history of smoking  ·	No history of alcohol abuse  ·	No history of illegal drug use    MEDICATIONS  (STANDING):  ·	acetaminophen   Tablet .. 975 milliGRAM(s) Oral every 8 hours  ·	atorvastatin 40 milliGRAM(s) Oral at bedtime  ·	heparin   Injectable 5000 Unit(s) SubCutaneous once  ·	oxybutynin 5 milliGRAM(s) Oral two times a day  ·	pantoprazole    Tablet 40 milliGRAM(s) Oral before breakfast  ·	senna 2 Tablet(s) Oral at bedtime  ·	sodium chloride 0.9%. 1000 milliLiter(s) (125 mL/Hr) IV Continuous <Continuous>    MEDICATIONS  (PRN):  ·	magnesium hydroxide Suspension 30 milliLiter(s) Oral daily PRN Constipation  ·	oxyCODONE    IR 2.5 milliGRAM(s) Oral every 4 hours PRN Moderate Pain (4 - 6)  ·	oxyCODONE    IR 5 milliGRAM(s) Oral every 4 hours PRN Severe Pain (7 - 10)    HOME MEDICATIONS:  ·	aspirin 81 mg oral tablet: 1 tab(s) orally once a day (last dose 6/17/19) (24 Jun 2019 07:58)  ·	atorvastatin 40 mg oral tablet: 1 tab(s) orally once a day (24 Jun 2019 07:58)  ·	NexIUM: otc 1 tab daily (24 Jun 2019 07:58)  ·	spectrum vitamins: 1 tab(s) orally once a day (last dose 6/17/19 (24 Jun 2019 07:58)  ·	VESIcare 5 mg oral tablet: 1 tab(s) orally once a day (24 Jun 2019 07:58)  ·	multivitamin  ·	Co-Q-10  ·	vitamin B12  ·	vtamin D3    ALLERGIES/INTOLERANCES:  ·	penicillin (Other)  ·	Intolerances    REVIEW OF SYSTEMS:  ·	CONSTITUTIONAL: No weakness, fever or chills  ·	EYES/ENT: No visual changes.  R-cataract surgery one year ago.  No dysphagia.  Hearing difficulty B/L  ·	NECK: No pain or stiffness  ·	RESPIRATORY: No cough or hemoptysis.  No shortness of breath  ·	CARDIOVASCULAR: No chest pain or palpitation.  No lower extremity edema  ·	GASTROINTESTINAL: No abdominal or epigastric pain. No nausea, vomiting or hematemesis.  No diarrhea or constipation. No melena or hematochezia.  ·	GENITOURINARY: No dysuria, frequency or hematuria  ·	MUSCULOSKELETAL: Pain of the right hip - worsens with movement.  No increased warmth or erythema  ·	NEUROLOGICAL: No numbness or weakness  ·	PSYCHIATRY: No anxiety, or depression.  ·	SKIN: No itching, burning, rashes, or lesions   ·	All other review of systems is negative unless indicated above.    LAB-WORK/STUDIES:               15.6   12.13 )-----------( 237      ( 17 Oct 2020 16:53 )             45.6     17 Oct 2020 16:53    141    |  103    |  27     ----------------------------<  155    4.0     |  23     |  0.67     Ca    8.9        17 Oct 2020 16:53    TPro  6.8    /  Alb  4.6    /  TBili  0.6    /  DBili  x      /  AST  24     /  ALT  23     /  AlkPhos  112    17 Oct 2020 16:53    LIVER FUNCTIONS - ( 17 Oct 2020 16:53 )  Alb: 4.6 g/dL / Pro: 6.8 g/dL / ALK PHOS: 112 u/L / ALT: 23 u/L / AST: 24 u/L / GGT: x           PT/INR - ( 17 Oct 2020 16:53 )   PT: 11.6 SEC;   INR: 1.01          PTT - ( 17 Oct 2020 16:53 )  PTT:27.5 SEC  CAPILLARY BLOOD GLUCOSE    =======================================================    ECG = NSR w/ sinus arrhythmia at 81 bpm, QTc = 441    =======================================================    Vital Signs Last 24 Hrs  T(C): 36.2 (17 Oct 2020 16:12), Max: 36.2 (17 Oct 2020 16:12)  T(F): 97.2 (17 Oct 2020 16:12), Max: 97.2 (17 Oct 2020 16:12)  HR: 80 (17 Oct 2020 16:12) (80 - 80)  BP: 160/78 (17 Oct 2020 16:12) (160/78 - 160/78)  BP(mean): --  RR: 16 (17 Oct 2020 16:12) (16 - 16)  SpO2: 100% (17 Oct 2020 16:12) (100% - 100%)    PHYSICAL EXAMINATION:  ·	APPEARANCE: Well groomed, slim female, lying supine in bed in NAD.  Appears a bit worried	  ·	HEENT: Moist oral mucosa, PERRL, EOMI.  Impaired hearing B/L; wearing hearing aids  ·	LYMPHATIC: No lymphadenopathy appreciated  ·	CARDIOVASCULAR: (+) S1 S2.  No JVD.  No murmurs appreciated.  No edema  ·	RESPIRATORY: No wheezing, rhonchi, crackles appreciated  ·	PSYCHIATRIC: A&O x 3.  Mood & affect appropriate to situation  ·	GASTROINTESTINAL:  Soft, Non-tender, + BS	  ·	SKIN: No rashes. No ecchymoses.  No cyanosis	  ·	NEUROLOGICAL: Non-focal, A&Ox3, PIERCE x 4 against gravity  ·	EXTREMITIES: Decreased ROM of the RLE at the hip; held in guarded position and slightly externally rotated.  Mild tenderness over hip area (s/p morphine earlier).  Has R-hand support in place due to past surgery of right thumb.  No clubbing, cyanosis or edema  ·	VASCULAR: Peripheral pulses palpable 2+ bilaterally   Medical evaluation prior to surgical intervention of this 80 year old female who presented with pain of the right hip - s/p falls.  Past history significant for Osteoarthritis, Overactive bladder, Hearing loss (B/L), and Hysterectomy.  Seen and evaluated at bedside with family (son, daughter, grandson), present.  Patient reports slipping and falling in the bathroom during the morning; no significant trauma and went back to bed for a while.  Later during the day, while reentering her home, in the company of her son, patient tripped and lost balance, landing on her right hip; unable to ambulate thereafter.  Son witnessed the event and was able to corroborate her recollection of events.  No head trauma or loss of consciousness.  No associated headaches, dizziness, giddiness, lightheadedness, blurred vision, chest pain, palpitations, shortness of breath, or any other related signs/symptoms surrounding the fall.    At baseline, patient is independently ambulatory and is able to climb flights of stairs independently and without difficulty; bedrooms at home are upstairs.  Independent it activities of daily living.    Vital signs in ED upon ED presentation as follows: BP = 160/78, HR = 80, RR = 16, T = 36.2 C (97.2 F), O2 Sat = 100% on RA.  Diagnosed with Intertrochanteric Fracture.  Admitted to Orthopedic Surgery.    FAMILY CONTACT NUMBER: Howie Hampton (son) - [phone] (home); [phone] (cellular)    PAST MEDICAL & SURGICAL HISTORY:  ·	Acid reflux  ·	Hearing loss; bilateral -- wears aids  ·	Overactive bladder  ·	OA (osteoarthritis); first carpometacarpal joint of right hand  ·	Fibroids  ·	Cataract surgery (right) - 09/2019  ·	Hysterectomy - 1990  ·	Right hand basal joint arthroscopy - 06/2019    FAMILY HISTORY:  ·	No pertinent family history in first degree relatives    SOCIAL HISTORY:    ·	Marital Status:  (  )    (  ) Single        (  )        (  )        ( x )   ·	Occupation:      Retired  ·	Lives with:        ( x ) Alone       (  ) Children     (  ) Spouse          (  ) Parents          (  ) Other  ·	  ·	No history of smoking  ·	No history of alcohol abuse  ·	No history of illegal drug use    MEDICATIONS  (STANDING):  ·	acetaminophen   Tablet .. 975 milliGRAM(s) Oral every 8 hours  ·	atorvastatin 40 milliGRAM(s) Oral at bedtime  ·	heparin   Injectable 5000 Unit(s) SubCutaneous once  ·	oxybutynin 5 milliGRAM(s) Oral two times a day  ·	pantoprazole    Tablet 40 milliGRAM(s) Oral before breakfast  ·	senna 2 Tablet(s) Oral at bedtime  ·	sodium chloride 0.9%. 1000 milliLiter(s) (125 mL/Hr) IV Continuous <Continuous>    MEDICATIONS  (PRN):  ·	magnesium hydroxide Suspension 30 milliLiter(s) Oral daily PRN Constipation  ·	oxyCODONE    IR 2.5 milliGRAM(s) Oral every 4 hours PRN Moderate Pain (4 - 6)  ·	oxyCODONE    IR 5 milliGRAM(s) Oral every 4 hours PRN Severe Pain (7 - 10)    HOME MEDICATIONS:  ·	aspirin 81 mg oral tablet: 1 tab(s) orally once a day (last dose 6/17/19) (24 Jun 2019 07:58)  ·	atorvastatin 40 mg oral tablet: 1 tab(s) orally once a day (24 Jun 2019 07:58)  ·	NexIUM: otc 1 tab daily (24 Jun 2019 07:58)  ·	spectrum vitamins: 1 tab(s) orally once a day (last dose 6/17/19 (24 Jun 2019 07:58)  ·	VESIcare 5 mg oral tablet: 1 tab(s) orally once a day (24 Jun 2019 07:58)  ·	multivitamin  ·	Co-Q-10  ·	vitamin B12  ·	vtamin D3    ALLERGIES/INTOLERANCES:  ·	penicillin (Other)  ·	Intolerances    REVIEW OF SYSTEMS:  ·	CONSTITUTIONAL: No weakness, fever or chills  ·	EYES/ENT: No visual changes.  R-cataract surgery one year ago.  No dysphagia.  Hearing difficulty B/L  ·	NECK: No pain or stiffness  ·	RESPIRATORY: No cough or hemoptysis.  No shortness of breath  ·	CARDIOVASCULAR: No chest pain or palpitation.  No lower extremity edema  ·	GASTROINTESTINAL: No abdominal or epigastric pain. No nausea, vomiting or hematemesis.  No diarrhea or constipation. No melena or hematochezia.  ·	GENITOURINARY: No dysuria, frequency or hematuria  ·	MUSCULOSKELETAL: Pain of the right hip - worsens with movement.  No increased warmth or erythema  ·	NEUROLOGICAL: No numbness or weakness  ·	PSYCHIATRY: No anxiety, or depression.  ·	SKIN: No itching, burning, rashes, or lesions   ·	All other review of systems is negative unless indicated above.    LAB-WORK/STUDIES:               15.6   12.13 )-----------( 237      ( 17 Oct 2020 16:53 )             45.6     17 Oct 2020 16:53    141    |  103    |  27     ----------------------------<  155    4.0     |  23     |  0.67     Ca    8.9        17 Oct 2020 16:53    TPro  6.8    /  Alb  4.6    /  TBili  0.6    /  DBili  x      /  AST  24     /  ALT  23     /  AlkPhos  112    17 Oct 2020 16:53    LIVER FUNCTIONS - ( 17 Oct 2020 16:53 )  Alb: 4.6 g/dL / Pro: 6.8 g/dL / ALK PHOS: 112 u/L / ALT: 23 u/L / AST: 24 u/L / GGT: x           PT/INR - ( 17 Oct 2020 16:53 )   PT: 11.6 SEC;   INR: 1.01          PTT - ( 17 Oct 2020 16:53 )  PTT:27.5 SEC  CAPILLARY BLOOD GLUCOSE    =======================================================    ECG = NSR w/ sinus arrhythmia at 81 bpm, QTc = 441    =======================================================    Vital Signs Last 24 Hrs  T(C): 36.2 (17 Oct 2020 16:12), Max: 36.2 (17 Oct 2020 16:12)  T(F): 97.2 (17 Oct 2020 16:12), Max: 97.2 (17 Oct 2020 16:12)  HR: 80 (17 Oct 2020 16:12) (80 - 80)  BP: 160/78 (17 Oct 2020 16:12) (160/78 - 160/78)  BP(mean): --  RR: 16 (17 Oct 2020 16:12) (16 - 16)  SpO2: 100% (17 Oct 2020 16:12) (100% - 100%)    PHYSICAL EXAMINATION:  ·	APPEARANCE: Well groomed, slim female, lying supine in bed in NAD.  Appears a bit worried	  ·	HEENT: Moist oral mucosa, PERRL, EOMI.  Impaired hearing B/L; wearing hearing aids  ·	LYMPHATIC: No lymphadenopathy appreciated  ·	CARDIOVASCULAR: (+) S1 S2.  No JVD.  No murmurs appreciated.  No edema  ·	RESPIRATORY: No wheezing, rhonchi, crackles appreciated  ·	PSYCHIATRIC: A&O x 3.  Mood & affect appropriate to situation  ·	GASTROINTESTINAL:  Soft, Non-tender, + BS	  ·	SKIN: No rashes. No ecchymoses.  No cyanosis	  ·	NEUROLOGICAL: Non-focal, A&Ox3, PIERCE x 4 against gravity  ·	EXTREMITIES: Decreased ROM of the RLE at the hip; held in guarded position and slightly externally rotated.  Mild tenderness over hip area (s/p morphine earlier).  Has R-hand support in place due to past surgery of right thumb.  No clubbing, cyanosis or edema  ·	VASCULAR: Peripheral pulses palpable 2+ bilaterally

## 2020-10-17 NOTE — ED PROVIDER NOTE - ATTENDING CONTRIBUTION TO CARE
Seen and examined, pt. with slip and fall this a.m. on "bathroom mat that bunched up" but had minimal c/o afterwards, was able to stand, walk, change and descend stairs afterwards. Then when going to get door for neighboor, pt. slipped and fell onto R side, c/o R hip and leg pains, unable to range or weight bear due to pain since then. Denies preceding lightheadedness/dizziness, no CP/SOB/palpitations, no recent illness, no fever/chills. Pt. with mild short term memory loss but has had inc. falls for several wks. Neck NT, clear lungs, heart reg, abd soft, NT to palp, no CVAt, no edema, shortened RLE, painful ROM at hip, NT knee/ankle, good distal pulses.

## 2020-10-17 NOTE — ED ADULT NURSE NOTE - CHIEF COMPLAINT QUOTE
mechanical trip and fall at front of house with fall to right side and hip. Non-ambulatory at scene. Denies CP/SOB. Denies blood thinner use. Shortening observed to right leg.  Hx Denies Med hx

## 2020-10-17 NOTE — CONSULT NOTE ADULT - PROBLEM SELECTOR RECOMMENDATION 5
- no acute issues presently  - continue on Ditropan  - now w/ Boudreaux catheter, in the setting of hip fracture w/ difficulty in movement due to severe pain; management as per primary team - with surgery to first carpometacarpal joint of right hand  - x-rays demonstrating osteopenia  - on MVI, Vitamin D supplements  - could give calcium supplement  - fall precautions

## 2020-10-17 NOTE — CONSULT NOTE ADULT - PROBLEM SELECTOR RECOMMENDATION 2
- as sequela of mechanical fall  - for surgical intervention (see above)  - management as per Orthopedic team

## 2020-10-17 NOTE — CONSULT NOTE ADULT - PROBLEM SELECTOR RECOMMENDATION 9
- R-intertrochanteric fracture as sequela of mechanical fall.  Independently ambulatory at baseline, including navigating up and down stairs  - ECG = NSR w/ sinus arrhythmia at 81 bpm, QTc = 441  - RCRI score = 0, Class I Risk with 3.9 % 30-day risk of death, MI, or cardiac arrest  - Functional Capacity = > 4 METS  - - R-intertrochanteric fracture as sequela of mechanical fall.  Independently ambulatory at baseline, including navigating up and down stairs  - ECG = NSR w/ sinus arrhythmia at 81 bpm, QTc = 441  - RCRI score = 0, Class I Risk with 3.9 % 30-day risk of death, MI, or cardiac arrest  - Functional Capacity = > 4 METS  - patient at low to intermediate risk for surgical intervention of the right intertrochanteric fracture; no current contraindication to surgery  - management per primary team

## 2020-10-17 NOTE — ED PROVIDER NOTE - PHYSICAL EXAMINATION
General appearance: NAD, conversant, afebrile    Neck: Trachea midline; Full range of motion, supple   Pulm: CTA bl, normal respiratory effort and no intercostal retractions, normal work of breathing   CV: RRR, No murmurs, rubs, or gallops   Abdomen: Soft, non-tender, non-distended; no masses or hepatosplenomegaly.   Extremities: No peripheral edema, no gross deformities, R leg externally rotated, slightly shortened, 2+ DP/ PT pulses, tenderness over R greater trochanter   Skin: Dry, normal temperature, turgor and texture; no rash, 2cm abrasion over r knee   Psych: Appropriate affect, cooperative; alert and oriented to person, place and time

## 2020-10-17 NOTE — ED PROVIDER NOTE - CLINICAL SUMMARY MEDICAL DECISION MAKING FREE TEXT BOX
81yo female with pmh arthritis presenting with mechanical falls, R hip pain.  Externally rotated and shortened RLE, suspicion for fracture vs dislocation.  Will xrays and preop labs.  Pain meds and reassess.  Dispo per imaging.

## 2020-10-17 NOTE — ED ADULT NURSE NOTE - OBJECTIVE STATEMENT
Pt presents to ED spot 24, from home via EMS s/p fall. Pt states she was coming inside her house when she tripped and landed on her R side, and now has excruciating pain to RLE. RLE found to be turned outward and shorter than L, +PMS.  Pt denies head strike, LOC, head neck or back pain. Pt is A&OX4, skin warm dry unremarkable, + regular radial pulses bi lat. Pt changed into gown, #[id number] IV placed to LAC, lab work collected as ordered. Will continue to monitor.

## 2020-10-17 NOTE — H&P ADULT - NSICDXPASTMEDICALHX_GEN_ALL_CORE_FT
PAST MEDICAL HISTORY:  Hearing loss bilateral -- wears aids    OA (osteoarthritis) first carpometacarpal joint of right hand    Overactive bladder

## 2020-10-18 ENCOUNTER — RESULT REVIEW (OUTPATIENT)
Age: 80
End: 2020-10-18

## 2020-10-18 DIAGNOSIS — D72.829 ELEVATED WHITE BLOOD CELL COUNT, UNSPECIFIED: ICD-10-CM

## 2020-10-18 DIAGNOSIS — K21.9 GASTRO-ESOPHAGEAL REFLUX DISEASE WITHOUT ESOPHAGITIS: ICD-10-CM

## 2020-10-18 LAB
ANION GAP SERPL CALC-SCNC: 11 MMO/L — SIGNIFICANT CHANGE UP (ref 7–14)
ANION GAP SERPL CALC-SCNC: 13 MMO/L — SIGNIFICANT CHANGE UP (ref 7–14)
APTT BLD: 29.7 SEC — SIGNIFICANT CHANGE UP (ref 27–36.3)
BLD GP AB SCN SERPL QL: NEGATIVE — SIGNIFICANT CHANGE UP
BUN SERPL-MCNC: 16 MG/DL — SIGNIFICANT CHANGE UP (ref 7–23)
BUN SERPL-MCNC: 20 MG/DL — SIGNIFICANT CHANGE UP (ref 7–23)
CALCIUM SERPL-MCNC: 8.4 MG/DL — SIGNIFICANT CHANGE UP (ref 8.4–10.5)
CALCIUM SERPL-MCNC: 8.8 MG/DL — SIGNIFICANT CHANGE UP (ref 8.4–10.5)
CHLORIDE SERPL-SCNC: 105 MMOL/L — SIGNIFICANT CHANGE UP (ref 98–107)
CHLORIDE SERPL-SCNC: 106 MMOL/L — SIGNIFICANT CHANGE UP (ref 98–107)
CO2 SERPL-SCNC: 23 MMOL/L — SIGNIFICANT CHANGE UP (ref 22–31)
CO2 SERPL-SCNC: 23 MMOL/L — SIGNIFICANT CHANGE UP (ref 22–31)
CREAT SERPL-MCNC: 0.54 MG/DL — SIGNIFICANT CHANGE UP (ref 0.5–1.3)
CREAT SERPL-MCNC: 0.57 MG/DL — SIGNIFICANT CHANGE UP (ref 0.5–1.3)
CULTURE RESULTS: NO GROWTH — SIGNIFICANT CHANGE UP
GLUCOSE SERPL-MCNC: 112 MG/DL — HIGH (ref 70–99)
GLUCOSE SERPL-MCNC: 120 MG/DL — HIGH (ref 70–99)
HCT VFR BLD CALC: 40.4 % — SIGNIFICANT CHANGE UP (ref 34.5–45)
HCT VFR BLD CALC: 41.3 % — SIGNIFICANT CHANGE UP (ref 34.5–45)
HGB BLD-MCNC: 13.4 G/DL — SIGNIFICANT CHANGE UP (ref 11.5–15.5)
HGB BLD-MCNC: 13.8 G/DL — SIGNIFICANT CHANGE UP (ref 11.5–15.5)
INR BLD: 1.1 — SIGNIFICANT CHANGE UP (ref 0.88–1.16)
MCHC RBC-ENTMCNC: 28.8 PG — SIGNIFICANT CHANGE UP (ref 27–34)
MCHC RBC-ENTMCNC: 29.5 PG — SIGNIFICANT CHANGE UP (ref 27–34)
MCHC RBC-ENTMCNC: 33.2 % — SIGNIFICANT CHANGE UP (ref 32–36)
MCHC RBC-ENTMCNC: 33.4 % — SIGNIFICANT CHANGE UP (ref 32–36)
MCV RBC AUTO: 86.2 FL — SIGNIFICANT CHANGE UP (ref 80–100)
MCV RBC AUTO: 88.8 FL — SIGNIFICANT CHANGE UP (ref 80–100)
NRBC # FLD: 0 K/UL — SIGNIFICANT CHANGE UP (ref 0–0)
NRBC # FLD: 0 K/UL — SIGNIFICANT CHANGE UP (ref 0–0)
PLATELET # BLD AUTO: 190 K/UL — SIGNIFICANT CHANGE UP (ref 150–400)
PLATELET # BLD AUTO: 201 K/UL — SIGNIFICANT CHANGE UP (ref 150–400)
PMV BLD: 10 FL — SIGNIFICANT CHANGE UP (ref 7–13)
PMV BLD: 10.1 FL — SIGNIFICANT CHANGE UP (ref 7–13)
POTASSIUM SERPL-MCNC: 4 MMOL/L — SIGNIFICANT CHANGE UP (ref 3.5–5.3)
POTASSIUM SERPL-MCNC: 4.1 MMOL/L — SIGNIFICANT CHANGE UP (ref 3.5–5.3)
POTASSIUM SERPL-SCNC: 4 MMOL/L — SIGNIFICANT CHANGE UP (ref 3.5–5.3)
POTASSIUM SERPL-SCNC: 4.1 MMOL/L — SIGNIFICANT CHANGE UP (ref 3.5–5.3)
PROTHROM AB SERPL-ACNC: 12.5 SEC — SIGNIFICANT CHANGE UP (ref 10.6–13.6)
RBC # BLD: 4.55 M/UL — SIGNIFICANT CHANGE UP (ref 3.8–5.2)
RBC # BLD: 4.79 M/UL — SIGNIFICANT CHANGE UP (ref 3.8–5.2)
RBC # FLD: 12.9 % — SIGNIFICANT CHANGE UP (ref 10.3–14.5)
RBC # FLD: 13 % — SIGNIFICANT CHANGE UP (ref 10.3–14.5)
RH IG SCN BLD-IMP: POSITIVE — SIGNIFICANT CHANGE UP
SODIUM SERPL-SCNC: 140 MMOL/L — SIGNIFICANT CHANGE UP (ref 135–145)
SODIUM SERPL-SCNC: 141 MMOL/L — SIGNIFICANT CHANGE UP (ref 135–145)
SPECIMEN SOURCE: SIGNIFICANT CHANGE UP
WBC # BLD: 11.52 K/UL — HIGH (ref 3.8–10.5)
WBC # BLD: 12.3 K/UL — HIGH (ref 3.8–10.5)
WBC # FLD AUTO: 11.52 K/UL — HIGH (ref 3.8–10.5)
WBC # FLD AUTO: 12.3 K/UL — HIGH (ref 3.8–10.5)

## 2020-10-18 PROCEDURE — 88311 DECALCIFY TISSUE: CPT | Mod: 26

## 2020-10-18 PROCEDURE — 72170 X-RAY EXAM OF PELVIS: CPT | Mod: 26

## 2020-10-18 PROCEDURE — 88304 TISSUE EXAM BY PATHOLOGIST: CPT | Mod: 26

## 2020-10-18 PROCEDURE — 27236 TREAT THIGH FRACTURE: CPT | Mod: RT

## 2020-10-18 PROCEDURE — 99233 SBSQ HOSP IP/OBS HIGH 50: CPT

## 2020-10-18 RX ORDER — POVIDONE-IODINE 5 %
1 AEROSOL (ML) TOPICAL ONCE
Refills: 0 | Status: COMPLETED | OUTPATIENT
Start: 2020-10-18 | End: 2020-10-18

## 2020-10-18 RX ORDER — HEPARIN SODIUM 5000 [USP'U]/ML
5000 INJECTION INTRAVENOUS; SUBCUTANEOUS EVERY 8 HOURS
Refills: 0 | Status: DISCONTINUED | OUTPATIENT
Start: 2020-10-18 | End: 2020-10-19

## 2020-10-18 RX ORDER — CEFAZOLIN SODIUM 1 G
2000 VIAL (EA) INJECTION EVERY 8 HOURS
Refills: 0 | Status: COMPLETED | OUTPATIENT
Start: 2020-10-18 | End: 2020-10-19

## 2020-10-18 RX ORDER — CHLORHEXIDINE GLUCONATE 213 G/1000ML
1 SOLUTION TOPICAL ONCE
Refills: 0 | Status: COMPLETED | OUTPATIENT
Start: 2020-10-18 | End: 2020-10-18

## 2020-10-18 RX ADMIN — Medication 975 MILLIGRAM(S): at 14:20

## 2020-10-18 RX ADMIN — Medication 5 MILLIGRAM(S): at 05:59

## 2020-10-18 RX ADMIN — CHLORHEXIDINE GLUCONATE 1 APPLICATION(S): 213 SOLUTION TOPICAL at 05:59

## 2020-10-18 RX ADMIN — SODIUM CHLORIDE 125 MILLILITER(S): 9 INJECTION INTRAMUSCULAR; INTRAVENOUS; SUBCUTANEOUS at 11:45

## 2020-10-18 RX ADMIN — ATORVASTATIN CALCIUM 40 MILLIGRAM(S): 80 TABLET, FILM COATED ORAL at 22:10

## 2020-10-18 RX ADMIN — HEPARIN SODIUM 5000 UNIT(S): 5000 INJECTION INTRAVENOUS; SUBCUTANEOUS at 14:20

## 2020-10-18 RX ADMIN — Medication 1 APPLICATION(S): at 05:59

## 2020-10-18 RX ADMIN — OXYCODONE HYDROCHLORIDE 5 MILLIGRAM(S): 5 TABLET ORAL at 19:55

## 2020-10-18 RX ADMIN — Medication 975 MILLIGRAM(S): at 22:10

## 2020-10-18 RX ADMIN — HEPARIN SODIUM 5000 UNIT(S): 5000 INJECTION INTRAVENOUS; SUBCUTANEOUS at 22:09

## 2020-10-18 RX ADMIN — Medication 975 MILLIGRAM(S): at 14:27

## 2020-10-18 RX ADMIN — Medication 975 MILLIGRAM(S): at 05:59

## 2020-10-18 RX ADMIN — HYDROMORPHONE HYDROCHLORIDE 0.5 MILLIGRAM(S): 2 INJECTION INTRAMUSCULAR; INTRAVENOUS; SUBCUTANEOUS at 00:00

## 2020-10-18 RX ADMIN — PANTOPRAZOLE SODIUM 40 MILLIGRAM(S): 20 TABLET, DELAYED RELEASE ORAL at 06:00

## 2020-10-18 RX ADMIN — OXYCODONE HYDROCHLORIDE 5 MILLIGRAM(S): 5 TABLET ORAL at 20:45

## 2020-10-18 RX ADMIN — Medication 100 MILLIGRAM(S): at 15:49

## 2020-10-18 NOTE — PROGRESS NOTE ADULT - PROBLEM SELECTOR PLAN 1
no other signs/symptoms suggestive of infection, likely due to stress demargination  - f/u clinically

## 2020-10-18 NOTE — PROGRESS NOTE ADULT - ASSESSMENT
80F Osteoarthritis, Overactive bladder, Hearing loss (B/L), s/p Hysterectomy, s/p mechanical fall, found to have R femoral neck fracture, s/p R hip arthroplasty on 10/18/20

## 2020-10-18 NOTE — PROGRESS NOTE ADULT - PROBLEM SELECTOR PLAN 4
with surgery to first carpometacarpal joint of right hand  - x-rays demonstrating osteopenia  - on MVI, Vitamin D supplements

## 2020-10-18 NOTE — PROGRESS NOTE ADULT - SUBJECTIVE AND OBJECTIVE BOX
Orthopedic Surgery Progress Note    S: Plan for possible OR today for hemiarthroplasty.  Overnight patient became lethargic following pain meds, desaturated to 88% on room air, placed on nasal cannula 2L.  Now improved.  No chest pain, shortness of breath, light-headedness.    O:  Vital Signs Last 24 Hrs  T(C): 36.7 (17 Oct 2020 23:32), Max: 36.8 (17 Oct 2020 21:35)  T(F): 98.1 (17 Oct 2020 23:32), Max: 98.2 (17 Oct 2020 21:35)  HR: 82 (18 Oct 2020 01:43) (80 - 88)  BP: 151/63 (18 Oct 2020 01:43) (143/66 - 160/78)  BP(mean): --  RR: 18 (18 Oct 2020 01:47) (16 - 19)  SpO2: 100% (18 Oct 2020 01:47) (88% - 100%)    Gen: Alert and oriented x3, NAD, patient resting comfortably in bed  RLE:  skin intact  EHL/FHL/TA/GS intact  SILT DP/SP/Parsons/Sa  No calf tenderness  WWP distally    Labs:                        15.6   12.13 )-----------( 237      ( 17 Oct 2020 16:53 )             45.6       10-17    141  |  103  |  27<H>  ----------------------------<  155<H>  4.0   |  23  |  0.67        PT/INR - ( 17 Oct 2020 16:53 )   PT: 11.6 SEC;   INR: 1.01          PTT - ( 17 Oct 2020 16:53 )  PTT:27.5 SEC    A/P 80y year old female with R femoral neck fracture s/p mechanical fall    Pain Control  DVT PPX held for possible OR  Bedrest  NWB LLE  NPO/IVFs  Medically cleared for OR  Covid negative Orthopedic Surgery Progress Note    S: Plan for possible OR today for hemiarthroplasty.  Overnight patient became lethargic following pain meds, desaturated to 88% on room air, placed on nasal cannula 2L.  Now improved.  No chest pain, shortness of breath, light-headedness.    O:  Vital Signs Last 24 Hrs  T(C): 36.7 (17 Oct 2020 23:32), Max: 36.8 (17 Oct 2020 21:35)  T(F): 98.1 (17 Oct 2020 23:32), Max: 98.2 (17 Oct 2020 21:35)  HR: 82 (18 Oct 2020 01:43) (80 - 88)  BP: 151/63 (18 Oct 2020 01:43) (143/66 - 160/78)  BP(mean): --  RR: 18 (18 Oct 2020 01:47) (16 - 19)  SpO2: 100% (18 Oct 2020 01:47) (88% - 100%)    Gen: Alert and oriented x3, NAD, patient resting comfortably in bed  RLE:  skin intact  EHL/FHL/TA/GS intact  SILT DP/SP/Parsons/Sa  No calf tenderness  WWP distally    Labs:                        15.6   12.13 )-----------( 237      ( 17 Oct 2020 16:53 )             45.6       10-17    141  |  103  |  27<H>  ----------------------------<  155<H>  4.0   |  23  |  0.67        PT/INR - ( 17 Oct 2020 16:53 )   PT: 11.6 SEC;   INR: 1.01          PTT - ( 17 Oct 2020 16:53 )  PTT:27.5 SEC    A/P 80y year old female with R femoral neck fracture s/p mechanical fall    Pain Control  DVT PPX held for possible OR  Bedrest  NWB LLE  NPO/IVFs  DC'd supplemental O2  Medically cleared for OR  Covid negative

## 2020-10-18 NOTE — PROGRESS NOTE ADULT - PROBLEM SELECTOR PLAN 2
R femoral neck fracture, s/p R hip arthroplasty on 10/18/20, postop management per ortho  - pain control, PT/WBAT, periop cefazolin; DVT ppx (SC heparin)

## 2020-10-18 NOTE — CHART NOTE - NSCHARTNOTEFT_GEN_A_CORE
Orthopaedics Post Op Check Note    Procedure: R hip hemiarthroplasty  Surgeon:     Pt comfortable, without complaints  Denies CP, SOB, N/V, numbness/tingling     Vital Signs Last 24 Hrs  T(C): 36.8 (10-18-20 @ 14:15), Max: 36.8 (10-18-20 @ 14:15)  T(F): 98.2 (10-18-20 @ 14:15), Max: 98.2 (10-18-20 @ 14:15)  HR: 73 (10-18-20 @ 14:15) (73 - 73)  BP: 121/57 (10-18-20 @ 14:15) (121/57 - 121/57)  BP(mean): --  RR: 17 (10-18-20 @ 14:15) (17 - 17)  SpO2: 100% (10-18-20 @ 14:15) (100% - 100%)  AVSS, NAD    Gen: awake, alert, NAD  Resp: no increased work of breathing  RUE: in hand brace  RLE:  Dressing c/d/i  +EHL/FHL/TA/GS  SILT S/S/SP/DP  +DP/PT Pulses  Compartments soft  No calf TTP     Other:    Post Op labs:  18 Oct 2020 11:16    140    |  106    |  16     ----------------------------<  112    4.1     |  23     |  0.54     Ca    8.4        18 Oct 2020 11:16    TPro  6.8    /  Alb  4.6    /  TBili  0.6    /  DBili  x      /  AST  24     /  ALT  23     /  AlkPhos  112    17 Oct 2020 16:53                          13.4   12.30 )-----------( 190      ( 18 Oct 2020 11:16 )             40.4       Post op XR:    A/P: 80yFemale POD#0 s/p R hip hemiarthroplasty    - Stable  - Pain Control  - DVT ppx: SQH  - Kenna op abx: ancef  - PT, WBS: WBAT  - F/U AM Labs

## 2020-10-18 NOTE — PROGRESS NOTE ADULT - SUBJECTIVE AND OBJECTIVE BOX
[facility] Division of Hospital Medicine  Arlene Hancock MD  Pager (M-F, 8A-5P):  In-house pager [phone]; Long-range pager [phone]  Other Times:  Please page Hospitalist in Charge -  In-house pager [phone]    Patient is a 80y old  Female who presents with a chief complaint of Right intertrochanteric fracture (17 Oct 2020 20:33)    SUBJECTIVE / OVERNIGHT EVENTS:  Pt seen earlier in PACU, resting comfortably.  No complaints.  Denied chest pain, SOB, dizziness, nausea.  Hungry.  ADDITIONAL REVIEW OF SYSTEMS:    MEDICATIONS  (STANDING):  acetaminophen   Tablet .. 975 milliGRAM(s) Oral every 8 hours  atorvastatin 40 milliGRAM(s) Oral at bedtime  ceFAZolin   IVPB 2000 milliGRAM(s) IV Intermittent every 8 hours  heparin   Injectable 5000 Unit(s) SubCutaneous every 8 hours  oxybutynin 5 milliGRAM(s) Oral two times a day  pantoprazole    Tablet 40 milliGRAM(s) Oral before breakfast  senna 2 Tablet(s) Oral at bedtime  sodium chloride 0.9%. 1000 milliLiter(s) (125 mL/Hr) IV Continuous <Continuous>    MEDICATIONS  (PRN):  magnesium hydroxide Suspension 30 milliLiter(s) Oral daily PRN Constipation  oxyCODONE    IR 2.5 milliGRAM(s) Oral every 4 hours PRN Moderate Pain (4 - 6)  oxyCODONE    IR 5 milliGRAM(s) Oral every 4 hours PRN Severe Pain (7 - 10)    I&O's Summary    17 Oct 2020 07:  -  18 Oct 2020 07:00  --------------------------------------------------------  IN: 0 mL / OUT: 725 mL / NET: -725 mL    18 Oct 2020 07:  -  18 Oct 2020 16:43  --------------------------------------------------------  IN: 595 mL / OUT: 380 mL / NET: 215 mL    PHYSICAL EXAM:  Vital Signs Last 24 Hrs  T(C): 36.8 (18 Oct 2020 14:15), Max: 36.8 (17 Oct 2020 21:35)  T(F): 98.2 (18 Oct 2020 14:15), Max: 98.2 (17 Oct 2020 21:35)  HR: 73 (18 Oct 2020 14:15) (59 - 88)  BP: 121/57 (18 Oct 2020 14:15) (92/48 - 153/62)  BP(mean): 62 (18 Oct 2020 13:30) (51 - 78)  RR: 17 (18 Oct 2020 14:15) (11 - 19)  SpO2: 100% (18 Oct 2020 14:15) (88% - 100%)    CONSTITUTIONAL: sitting up in bed, NAD  RESPIRATORY: Normal respiratory effort; lungs are clear to auscultation bilaterally  CARDIOVASCULAR: Regular rate and rhythm, normal S1 and S2, no murmur/rub/gallop  ABDOMEN: Nontender to palpation, normoactive bowel sounds, no rebound/guarding  PSYCH: alert, calm  NEURO: nonfocal   RLE dressing intact    LABS:                        13.4   12.30 )-----------( 190      ( 18 Oct 2020 11:16 )             40.4     10-18    140  |  106  |  16  ----------------------------<  112<H>  4.1   |  23  |  0.54    Ca    8.4      18 Oct 2020 11:16    TPro  6.8  /  Alb  4.6  /  TBili  0.6  /  DBili  x   /  AST  24  /  ALT  23  /  AlkPhos  112  10-17    PT/INR - ( 18 Oct 2020 03:41 )   PT: 12.5 SEC;   INR: 1.10     PTT - ( 18 Oct 2020 03:41 )  PTT:29.7 SEC    Urinalysis Basic - ( 17 Oct 2020 21:02 )  Color: LIGHT YELLOW / Appearance: CLEAR / S.022 / pH: 6.0  Gluc: NEGATIVE / Ketone: SMALL  / Bili: NEGATIVE / Urobili: NORMAL   Blood: NEGATIVE / Protein: NEGATIVE / Nitrite: NEGATIVE   Leuk Esterase: NEGATIVE / RBC: x / WBC x   Sq Epi: x / Non Sq Epi: x / Bacteria: x    RADIOLOGY & ADDITIONAL TESTS:  Results Reviewed:   Imaging Personally Reviewed:  Electrocardiogram Personally Reviewed:    COORDINATION OF CARE:  Care Discussed with Consultants/Other Providers [Y/N]: ortho re overall care  Prior or Outpatient Records Reviewed [Y/N]:

## 2020-10-18 NOTE — PROVIDER CONTACT NOTE (OTHER) - ASSESSMENT
Pt. c/o feeling dizzy, stated "I feel like the room is spinning" after receiving IVP Dilaudid earlier. V/S presented w/ O2 saturation of 88%. Pt. denied feeling SOB & denied chest pain. 2L NC applied to pt. w/ O2 saturation rising to 100%. Pt. states that dizziness is getting better.

## 2020-10-19 DIAGNOSIS — Z29.9 ENCOUNTER FOR PROPHYLACTIC MEASURES, UNSPECIFIED: ICD-10-CM

## 2020-10-19 LAB
ANION GAP SERPL CALC-SCNC: 9 MMO/L — SIGNIFICANT CHANGE UP (ref 7–14)
BUN SERPL-MCNC: 11 MG/DL — SIGNIFICANT CHANGE UP (ref 7–23)
CALCIUM SERPL-MCNC: 8 MG/DL — LOW (ref 8.4–10.5)
CHLORIDE SERPL-SCNC: 105 MMOL/L — SIGNIFICANT CHANGE UP (ref 98–107)
CO2 SERPL-SCNC: 23 MMOL/L — SIGNIFICANT CHANGE UP (ref 22–31)
CREAT SERPL-MCNC: 0.52 MG/DL — SIGNIFICANT CHANGE UP (ref 0.5–1.3)
GLUCOSE SERPL-MCNC: 131 MG/DL — HIGH (ref 70–99)
HCT VFR BLD CALC: 35.5 % — SIGNIFICANT CHANGE UP (ref 34.5–45)
HGB BLD-MCNC: 12.1 G/DL — SIGNIFICANT CHANGE UP (ref 11.5–15.5)
MCHC RBC-ENTMCNC: 29.6 PG — SIGNIFICANT CHANGE UP (ref 27–34)
MCHC RBC-ENTMCNC: 34.1 % — SIGNIFICANT CHANGE UP (ref 32–36)
MCV RBC AUTO: 86.8 FL — SIGNIFICANT CHANGE UP (ref 80–100)
NRBC # FLD: 0 K/UL — SIGNIFICANT CHANGE UP (ref 0–0)
PLATELET # BLD AUTO: 161 K/UL — SIGNIFICANT CHANGE UP (ref 150–400)
PMV BLD: 10.9 FL — SIGNIFICANT CHANGE UP (ref 7–13)
POTASSIUM SERPL-MCNC: 3.7 MMOL/L — SIGNIFICANT CHANGE UP (ref 3.5–5.3)
POTASSIUM SERPL-SCNC: 3.7 MMOL/L — SIGNIFICANT CHANGE UP (ref 3.5–5.3)
RBC # BLD: 4.09 M/UL — SIGNIFICANT CHANGE UP (ref 3.8–5.2)
RBC # FLD: 13.2 % — SIGNIFICANT CHANGE UP (ref 10.3–14.5)
SODIUM SERPL-SCNC: 137 MMOL/L — SIGNIFICANT CHANGE UP (ref 135–145)
WBC # BLD: 8.83 K/UL — SIGNIFICANT CHANGE UP (ref 3.8–10.5)
WBC # FLD AUTO: 8.83 K/UL — SIGNIFICANT CHANGE UP (ref 3.8–10.5)

## 2020-10-19 PROCEDURE — 99221 1ST HOSP IP/OBS SF/LOW 40: CPT

## 2020-10-19 PROCEDURE — 99233 SBSQ HOSP IP/OBS HIGH 50: CPT

## 2020-10-19 RX ORDER — ASCORBIC ACID 60 MG
500 TABLET,CHEWABLE ORAL DAILY
Refills: 0 | Status: DISCONTINUED | OUTPATIENT
Start: 2020-10-19 | End: 2020-10-21

## 2020-10-19 RX ORDER — ASPIRIN/CALCIUM CARB/MAGNESIUM 324 MG
325 TABLET ORAL
Refills: 0 | Status: DISCONTINUED | OUTPATIENT
Start: 2020-10-19 | End: 2020-10-19

## 2020-10-19 RX ORDER — TRAMADOL HYDROCHLORIDE 50 MG/1
25 TABLET ORAL
Refills: 0 | Status: DISCONTINUED | OUTPATIENT
Start: 2020-10-19 | End: 2020-10-21

## 2020-10-19 RX ORDER — ASPIRIN/CALCIUM CARB/MAGNESIUM 324 MG
325 TABLET ORAL
Refills: 0 | Status: DISCONTINUED | OUTPATIENT
Start: 2020-10-19 | End: 2020-10-21

## 2020-10-19 RX ORDER — OXYBUTYNIN CHLORIDE 5 MG
5 TABLET ORAL
Refills: 0 | Status: DISCONTINUED | OUTPATIENT
Start: 2020-10-19 | End: 2020-10-19

## 2020-10-19 RX ORDER — POLYETHYLENE GLYCOL 3350 17 G/17G
17 POWDER, FOR SOLUTION ORAL DAILY
Refills: 0 | Status: DISCONTINUED | OUTPATIENT
Start: 2020-10-19 | End: 2020-10-21

## 2020-10-19 RX ADMIN — SENNA PLUS 2 TABLET(S): 8.6 TABLET ORAL at 21:45

## 2020-10-19 RX ADMIN — Medication 975 MILLIGRAM(S): at 21:45

## 2020-10-19 RX ADMIN — OXYCODONE HYDROCHLORIDE 5 MILLIGRAM(S): 5 TABLET ORAL at 09:54

## 2020-10-19 RX ADMIN — Medication 325 MILLIGRAM(S): at 08:19

## 2020-10-19 RX ADMIN — Medication 500 MILLIGRAM(S): at 11:45

## 2020-10-19 RX ADMIN — Medication 1 TABLET(S): at 11:45

## 2020-10-19 RX ADMIN — Medication 325 MILLIGRAM(S): at 18:14

## 2020-10-19 RX ADMIN — OXYCODONE HYDROCHLORIDE 5 MILLIGRAM(S): 5 TABLET ORAL at 11:57

## 2020-10-19 RX ADMIN — Medication 975 MILLIGRAM(S): at 13:31

## 2020-10-19 RX ADMIN — PANTOPRAZOLE SODIUM 40 MILLIGRAM(S): 20 TABLET, DELAYED RELEASE ORAL at 05:53

## 2020-10-19 RX ADMIN — Medication 100 MILLIGRAM(S): at 00:15

## 2020-10-19 RX ADMIN — ATORVASTATIN CALCIUM 40 MILLIGRAM(S): 80 TABLET, FILM COATED ORAL at 21:45

## 2020-10-19 RX ADMIN — HEPARIN SODIUM 5000 UNIT(S): 5000 INJECTION INTRAVENOUS; SUBCUTANEOUS at 05:53

## 2020-10-19 RX ADMIN — Medication 975 MILLIGRAM(S): at 05:54

## 2020-10-19 RX ADMIN — POLYETHYLENE GLYCOL 3350 17 GRAM(S): 17 POWDER, FOR SOLUTION ORAL at 11:45

## 2020-10-19 NOTE — PROGRESS NOTE ADULT - SUBJECTIVE AND OBJECTIVE BOX
Patient seen and examined.  No acute events overnight.  Pain well controlled.     Vital Signs Last 24 Hrs  T(C): 36.8 (19 Oct 2020 05:49), Max: 37.3 (18 Oct 2020 21:38)  T(F): 98.2 (19 Oct 2020 05:49), Max: 99.2 (18 Oct 2020 21:38)  HR: 79 (19 Oct 2020 05:49) (59 - 85)  BP: 121/63 (19 Oct 2020 05:49) (92/48 - 132/43)  BP(mean): 62 (18 Oct 2020 13:30) (51 - 78)  RR: 16 (19 Oct 2020 05:49) (11 - 18)  SpO2: 97% (19 Oct 2020 05:49) (94% - 100%)    10-17 @ 07:01  -  10-18 @ 07:00  --------------------------------------------------------  IN: 0 mL / OUT: 725 mL / NET: -725 mL    10-18 @ 07:01  -  10-19 @ 06:34  --------------------------------------------------------  IN: 595 mL / OUT: 1395 mL / NET: -800 mL                            13.4   12.30 )-----------( 190      ( 18 Oct 2020 11:16 )             40.4   10-18    140  |  106  |  16  ----------------------------<  112<H>  4.1   |  23  |  0.54    Ca    8.4      18 Oct 2020 11:16    TPro  6.8  /  Alb  4.6  /  TBili  0.6  /  DBili  x   /  AST  24  /  ALT  23  /  AlkPhos  112  10-17      Exam:  Gen: NAD  RLE:  Dressing c/d/i  Motor: 5/5 EHL/FHL/TA/Gastrocnemius  Sensory: SILT DP/SP/S/S/T nerve distributions  Vascular: 2+ Dorsalis Pedis pulse        Assessment/Plan:  This is a 80yFemale admitted for R FN fx s/p hemiathroplasty.    -pain control  -PT  -WBAT  -OOB  -DVT ppx  -dispo plan

## 2020-10-19 NOTE — OCCUPATIONAL THERAPY INITIAL EVALUATION ADULT - PHYSICAL ASSIST/NONPHYSICAL ASSIST: STAND/SIT, REHAB EVAL
2 person assist/verbal cues 2 person assist/nonverbal cues (demo/gestures)/verbal cues/set-up required

## 2020-10-19 NOTE — OCCUPATIONAL THERAPY INITIAL EVALUATION ADULT - IMPAIRED TRANSFERS: BED/CHAIR, REHAB EVAL
impaired balance/decreased strength/decreased ROM/pain decreased strength/impaired balance/decreased ROM

## 2020-10-19 NOTE — OCCUPATIONAL THERAPY INITIAL EVALUATION ADULT - ADDITIONAL COMMENTS
Patient reports having a walk-in shower with grab bars and a shower chair in her own house.  If discharged to home, patient plans on temporarily staying at daughter's home, which has one step to enter and a bedroom on the main level.  The bathroom is on the second floor, but patient reports being able to sponge bathe while remaining on main level.

## 2020-10-19 NOTE — PROGRESS NOTE ADULT - PROBLEM SELECTOR PLAN 2
R femoral neck fracture, s/p R hip arthroplasty on 10/18/20, postop management per ortho  - pain control, PT/WBAT, periop cefazolin; DVT ppx (SC heparin) - no other signs/symptoms suggestive of infection, likely due to stress demargination  - f/u clinically

## 2020-10-19 NOTE — PROGRESS NOTE ADULT - PROBLEM SELECTOR PROBLEM 1
Leukocytosis, unspecified type Closed displaced intertrochanteric fracture of right femur, initial encounter

## 2020-10-19 NOTE — PROGRESS NOTE ADULT - PROBLEM SELECTOR PLAN 6
uses Nexium occasionally  - continues on PPI  - HOB elevation. - uses Nexium occasionally  - continues on PPI  - HOB elevation.

## 2020-10-19 NOTE — CONSULT NOTE ADULT - SUBJECTIVE AND OBJECTIVE BOX
Patient is a 80y old  Female who presents with a chief complaint of fracture (18 Oct 2020 16:43)      HPI:  80y Female [facility].  She immediately c/o severe R hip pain and inability to ambulate.  Patient denies headstrike or LOC. Localizes pain to R hip/femur. Patient denies radiation of pain. Patient denies numbness/tingling/burning in the RLE. No other bone/joint complaints. Patient is a community ambulator at baseline without assistive devices. Patient lives alone, has no issues w/ ADLs/IADLs.     Pt s/p R hemiarthroplasty on 10/18. Patient reports pain is controlled, worse with ambulation but is tolerable. Reports mild dizziness.     REVIEW OF SYSTEMS  Constitutional - No fever, No weight loss, No fatigue  HEENT - No eye pain, No visual disturbances, No difficulty hearing, No tinnitus, No vertigo, No neck pain  Respiratory - No cough, No wheezing, No shortness of breath  Cardiovascular - No chest pain, No palpitations  Gastrointestinal - No abdominal pain, No nausea, No vomiting, No diarrhea, No constipation  Genitourinary - No dysuria, No frequency, No hematuria, No incontinence  Neurological - No headaches, No memory loss,+ loss of strength, No numbness, No tremors  Skin - No itching, No rashes, No lesions   Endocrine - No temperature intolerance  Musculoskeletal - + r hip pain  Psychiatric - No depression, No anxiety    PAST MEDICAL & SURGICAL HISTORY  Overactive bladder    OA (osteoarthritis)    Hearing loss    Fibroids      SOCIAL HISTORY  Smoking - Denied  EtOH - Denied   Drugs - Denied    FUNCTIONAL HISTORY  Lives alone, 5 stairs to enter, 1 flight inside  Independent with adls, daughters helped with groceries    CURRENT FUNCTIONAL STATUS    Bed Mobility: Supine to Sit:     · Level of [place]	moderate assist (50% patients effort)  · Physical Assist/Nonphysical Assist	2 person assist  · Assistive Device	bed rails    Bed Mobility Analysis:     · Bed Mobility Limitations	impaired ability to control trunk for mobility; decreased ability to use legs for bridging/pushing  · Impairments Contributing to Impaired Bed Mobility	impaired balance; impaired motor control; impaired postural control; decreased ROM; decreased strength    Transfer: Sit to Stand:     · Level of [place]	moderate assist (50% patients effort)  · Physical Assist/Nonphysical Assist	2 person assist  · Weight-Bearing Restrictions	weight-bearing as tolerated  · Assistive Device	rolling walker    Transfer: Stand to Sit:     · Level of [place]	moderate assist (50% patients effort)  · Weight-Bearing Restrictions	weight-bearing as tolerated  · Assistive Device	rolling walker    Sit/Stand Transfer Safety Analysis:     · Transfer Safety Concerns Noted	decreased weight-shifting ability; losing balance  · Impairments Contributing to Impaired Transfers	impaired balance; decreased strength; decreased ROM; pain; impaired motor control    Gait Skills:     · Level of [place]	contact guard  · Physical Assist/Nonphysical Assist	2 person assist  · Weight-Bearing Restrictions	weight-bearing as tolerated  · Assistive Device	rolling walker  · Gait Distance	25 feet      FAMILY HISTORY   No pertinent family history in first degree relatives        RECENT LABS/IMAGING  CBC Full  -  ( 19 Oct 2020 06:54 )  WBC Count : 8.83 K/uL  RBC Count : 4.09 M/uL  Hemoglobin : 12.1 g/dL  Hematocrit : 35.5 %  Platelet Count - Automated : 161 K/uL  Mean Cell Volume : 86.8 fL  Mean Cell Hemoglobin : 29.6 pg  Mean Cell Hemoglobin Concentration : 34.1 %  Auto Neutrophil # : x  Auto Lymphocyte # : x  Auto Monocyte # : x  Auto Eosinophil # : x  Auto Basophil # : x  Auto Neutrophil % : x  Auto Lymphocyte % : x  Auto Monocyte % : x  Auto Eosinophil % : x  Auto Basophil % : x    10-19    137  |  105  |  11  ----------------------------<  131<H>  3.7   |  23  |  0.52    Ca    8.0<L>      19 Oct 2020 06:54    TPro  6.8  /  Alb  4.6  /  TBili  0.6  /  DBili  x   /  AST  24  /  ALT  23  /  AlkPhos  112  10-17    Urinalysis Basic - ( 17 Oct 2020 21:02 )    Color: LIGHT YELLOW / Appearance: CLEAR / S.022 / pH: 6.0  Gluc: NEGATIVE / Ketone: SMALL  / Bili: NEGATIVE / Urobili: NORMAL   Blood: NEGATIVE / Protein: NEGATIVE / Nitrite: NEGATIVE   Leuk Esterase: NEGATIVE / RBC: x / WBC x   Sq Epi: x / Non Sq Epi: x / Bacteria: x        VITALS  T(C): 36.5 (10-19-20 @ 10:33), Max: 37.3 (10-18-20 @ 21:38)  HR: 82 (10-19-20 @ 10:33) (67 - 85)  BP: 131/46 (10-19-20 @ 10:33) (105/42 - 131/46)  RR: 18 (10-19-20 @ 10:33) (12 - 18)  SpO2: 97% (10-19-20 @ 10:33) (96% - 100%)  Wt(kg): --    ALLERGIES  penicillin (Other)      MEDICATIONS   acetaminophen   Tablet .. 975 milliGRAM(s) Oral every 8 hours  ascorbic acid 500 milliGRAM(s) Oral daily  aspirin 325 milliGRAM(s) Oral two times a day  atorvastatin 40 milliGRAM(s) Oral at bedtime  calcium carbonate 1250 mG  + Vitamin D (OsCal 500 + D) 1 Tablet(s) Oral daily  magnesium hydroxide Suspension 30 milliLiter(s) Oral daily PRN  oxyCODONE    IR 2.5 milliGRAM(s) Oral every 4 hours PRN  oxyCODONE    IR 5 milliGRAM(s) Oral every 4 hours PRN  pantoprazole    Tablet 40 milliGRAM(s) Oral before breakfast  polyethylene glycol 3350 17 Gram(s) Oral daily  senna 2 Tablet(s) Oral at bedtime  traMADol 25 milliGRAM(s) Oral two times a day      ----------------------------------------------------------------------------------------  PHYSICAL EXAM  Constitutional - NAD, Comfortable  HEENT - NCAT, EOMI  Neck - Supple, No limited ROM  Chest - no respiratory distress  Cardiovascular - S1S2   Abdomen - Soft, NTND  Extremities - + R hip dressing intact, No calf tenderness   Neurologic Exam -                    Cognitive - Awake, Alert, AAO to self, place, date, year, situation     Communication - Fluent, No dysarthria     Cranial Nerves - CN 2-12 intact     Motor -                      LEFT    UE - ShAB 5/5, EF 5/5, EE 5/5, WE 5/5,  5/5                    RIGHT UE - ShAB 5/5, EF 5/5, EE 5/5, WE 5/5,  5/5                    LEFT    LE - HF 5/5, KE 5/5, DF 5/5, PF 5/5                    RIGHT LE - HF 3/5, KE 4/5, DF 5/5, PF 5/        Sensory - Intact to LT       Balance - WNL Static  Psychiatric - Mood stable, Affect WNL  ----------------------------------------------------------------------------------------  ASSESSMENT/PLAN 79 yo f admitted after fall, found to have R hip fx, now s/p hemiarthroplasty    Pain -tramadol, oxy IR prn, tylenol. with bowel regimen  DVT PPX - asa   Diet: regular, Kosher  Rehab -  patient can tolerate 3/hrs per day of therapy however patient prefers subacute rehab, states she would prefer longer stay in rehab.     Recommend [facility] based on patient preference.

## 2020-10-19 NOTE — OCCUPATIONAL THERAPY INITIAL EVALUATION ADULT - GENERAL OBSERVATIONS, REHAB EVAL
Patient was received semisupine in bed in no acute distress. Patient was alert and following directions. +IV +catheter Patient was received semisupine in bed in no acute distress. Patient agreed to evaluation from Occupational Therapist. +IV +catheter

## 2020-10-19 NOTE — PROGRESS NOTE ADULT - PROBLEM SELECTOR PLAN 4
with surgery to first carpometacarpal joint of right hand  - x-rays demonstrating osteopenia  - on MVI, Vitamin D supplements - with surgery to first carpometacarpal joint of right hand  - x-rays demonstrating osteopenia  - on MVI, Vitamin D supplements

## 2020-10-19 NOTE — OCCUPATIONAL THERAPY INITIAL EVALUATION ADULT - IMPAIRED TRANSFERS: SIT/STAND, REHAB EVAL
decreased strength/decreased ROM/impaired balance/pain decreased strength/impaired balance/decreased ROM

## 2020-10-19 NOTE — OCCUPATIONAL THERAPY INITIAL EVALUATION ADULT - PERTINENT HX OF CURRENT PROBLEM, REHAB EVAL
Pt is an 80 year old female who presented to [facility] on 10/17/2020 s/p fall and right hip pain. Xray of Right Hip on 10/17/2020 displayed acute slightly offset right femoral neck fracture. Pt is now s/p Left hip hemiarthroplasty on 10/17/2020. Pt is an 80 year old female who presented to [facility] on 10/17/2020 s/p fall and right hip pain. X-ray of Right Hip on 10/17/2020 displayed acute slightly offset right femoral neck fracture. Pt is now s/p Left hip hemiarthroplasty on 10/17/2020. Pt is an 80 year old female who presented to [facility] on 10/17/2020 s/p fall and right hip pain. X-ray of Right Hip on 10/17/2020 displayed acute slightly offset right femoral neck fracture. Pt is now s/p Right hip hemiarthroplasty on 10/17/2020.

## 2020-10-19 NOTE — PROGRESS NOTE ADULT - ASSESSMENT
80F Osteoarthritis, Overactive bladder, Hearing loss (B/L), s/p Hysterectomy, s/p mechanical fall, found to have R femoral neck fracture, s/p R hip arthroplasty on 10/18/20 80F  with hx of Osteoarthritis, Overactive bladder, Hearing loss (B/L), s/p Hysterectomy, s/p mechanical fall, found to have R femoral neck fracture, s/p R hip arthroplasty on 10/18/20 80F with hx of Osteoarthritis, Overactive bladder, Hearing loss (B/L), s/p Hysterectomy, s/p mechanical fall, found to have R femoral neck fracture, s/p R hip arthroplasty on 10/18/20

## 2020-10-19 NOTE — OCCUPATIONAL THERAPY INITIAL EVALUATION ADULT - DIAGNOSIS, OT EVAL
s/p right hip hemiarthroplasty secondary to femoral neck fracture; decreased functional mobility and ADL performance

## 2020-10-19 NOTE — OCCUPATIONAL THERAPY INITIAL EVALUATION ADULT - LIVES WITH, PROFILE
in a house with 5 steps to enter and 12 steps to the bedroom and bathroom./alone alone/in a house with 5 steps to enter and 12 steps to the bedroom and bathroom on the second level. Patient reports having a walk-in shower with grab bars and a "built-in" shower chair in her own house. Pt. also reports she can stay at her daughter's home if needed, +one step to enter and a bedroom on the main level. The bathroom is on the second floor, but patient reports being able to sponge bathe while remaining on main level.

## 2020-10-19 NOTE — PHYSICAL THERAPY INITIAL EVALUATION ADULT - ADDITIONAL COMMENTS
patient reports she can stay with her daughter following discharge. Daughter's home has only 1 step to negotiate

## 2020-10-19 NOTE — PROGRESS NOTE ADULT - SUBJECTIVE AND OBJECTIVE BOX
ANESTHESIA POSTOP CHECK    80y Female POSTOP DAY 1 S/P   [x ] General Anesthesia  [ ] Baljit Anesthesia  [ ] MAC    Vital Signs Last 24 Hrs  T(C): 36.8 (19 Oct 2020 05:49), Max: 37.3 (18 Oct 2020 21:38)  T(F): 98.2 (19 Oct 2020 05:49), Max: 99.2 (18 Oct 2020 21:38)  HR: 79 (19 Oct 2020 05:49) (59 - 85)  BP: 121/63 (19 Oct 2020 05:49) (92/48 - 130/60)  BP(mean): 62 (18 Oct 2020 13:30) (51 - 78)  RR: 16 (19 Oct 2020 05:49) (11 - 18)  SpO2: 97% (19 Oct 2020 05:49) (94% - 100%)  I&O's Summary    18 Oct 2020 07:01  -  19 Oct 2020 07:00  --------------------------------------------------------  IN: 595 mL / OUT: 1395 mL / NET: -800 mL    19 Oct 2020 07:01  -  19 Oct 2020 09:59  --------------------------------------------------------  IN: 0 mL / OUT: 250 mL / NET: -250 mL        [x ] NO APPARENT ANESTHESIA COMPLICATIONS      Comments:

## 2020-10-19 NOTE — PROGRESS NOTE ADULT - SUBJECTIVE AND OBJECTIVE BOX
[facility] Division of Hospital Medicine  Aditi Wu MD  Pager (M-F, 8A-5P): [phone]  Other Times:  [phone]    Patient is a 80y old  Female who presents with a chief complaint of fracture (18 Oct 2020 16:43)    SUBJECTIVE / OVERNIGHT EVENTS: Patient seen and examined. No acute events overnight. Pain well controlled and patient without any complaints.    MEDICATIONS  (STANDING):  acetaminophen   Tablet .. 975 milliGRAM(s) Oral every 8 hours  ascorbic acid 500 milliGRAM(s) Oral daily  aspirin 325 milliGRAM(s) Oral two times a day  atorvastatin 40 milliGRAM(s) Oral at bedtime  calcium carbonate 1250 mG  + Vitamin D (OsCal 500 + D) 1 Tablet(s) Oral daily  pantoprazole    Tablet 40 milliGRAM(s) Oral before breakfast  polyethylene glycol 3350 17 Gram(s) Oral daily  senna 2 Tablet(s) Oral at bedtime  traMADol 25 milliGRAM(s) Oral two times a day    MEDICATIONS  (PRN):  magnesium hydroxide Suspension 30 milliLiter(s) Oral daily PRN Constipation  oxyCODONE    IR 2.5 milliGRAM(s) Oral every 4 hours PRN Moderate Pain (4 - 6)  oxyCODONE    IR 5 milliGRAM(s) Oral every 4 hours PRN Severe Pain (7 - 10)      VITALS:  T(F): 97.7 (10-19-20 @ 10:33), Max: 99.2 (10-18-20 @ 21:38)  HR: 82 (10-19-20 @ 10:33) (73 - 85)  BP: 131/46 (10-19-20 @ 10:33) (105/42 - 131/46)  RR: 18 (10-19-20 @ 10:33) (14 - 18)  SpO2: 97% (10-19-20 @ 10:33      CAPILLARY BLOOD GLUCOSE    CONSTITUTIONAL: sitting up in bed, NAD  RESPIRATORY: Normal respiratory effort; lungs are clear to auscultation bilaterally  CARDIOVASCULAR: Regular rate and rhythm, normal S1 and S2, no murmur/rub/gallop  ABDOMEN: Nontender to palpation, normoactive bowel sounds, no rebound/guarding  PSYCH: alert, calm  NEURO: nonfocal, sensation intact   SKIN: RLE dressing intact, c/d/i    LABS:              12.1                 137  | 23   | 11           8.83  >-----------< 161     ------------------------< 131                   35.5                 3.7  | 105  | 0.52                                         Ca 8.0   Mg x     Ph x           TPro  6.8  /  Alb  4.6      TBili  0.6  /  DBili  x         AST  24  /  ALT  23            AlkPhos  112      INR: 1.10 ;    PT: 12.5 SEC;    PTT: 29.7 SEC        Urinalysis Basic - ( 17 Oct 2020 21:02 )    Color: LIGHT YELLOW / Appearance: CLEAR / S.022 / pH: 6.0  Gluc: NEGATIVE / Ketone: SMALL  / Bili: NEGATIVE / Urobili: NORMAL   Blood: NEGATIVE / Protein: NEGATIVE / Nitrite: NEGATIVE   Leuk Esterase: NEGATIVE / RBC: x / WBC x   Sq Epi: x / Non Sq Epi: x / Bacteria: x        RADIOLOGY & ADDITIONAL TESTS:  Imaging Personally Reviewed: [x] Yes    [ ] Consultant(s) Notes Reviewed:  [x] Care Discussed with Consultants/Other Providers: Orthopedic PA - discussed

## 2020-10-19 NOTE — PROGRESS NOTE ADULT - PROBLEM SELECTOR PLAN 5
at home on Ditropan, has manda post op and will need TOV  hold ditropan for now - at home on Ditropan, has manda post op and will need TOV  - hold ditropan for TOV

## 2020-10-19 NOTE — PHYSICAL THERAPY INITIAL EVALUATION ADULT - RANGE OF MOTION EXAMINATION, REHAB EVAL
RLE AROM limited due to pain/bilateral upper extremity ROM was WFL (within functional limits)/bilateral lower extremity ROM was WFL (within functional limits)

## 2020-10-19 NOTE — OCCUPATIONAL THERAPY INITIAL EVALUATION ADULT - MD ORDER
Occupational Therapy (OT) to evaluate and treat. Occupational Therapy (OT) to evaluate and treat. Activity order: ambulate with walker and OOB with assistance.

## 2020-10-19 NOTE — OCCUPATIONAL THERAPY INITIAL EVALUATION ADULT - PLANNED THERAPY INTERVENTIONS, OT EVAL
transfer training/strengthening/balance training/bed mobility training/ADL retraining ADL retraining/bed mobility training/strengthening/transfer training/balance training/ROM

## 2020-10-19 NOTE — OCCUPATIONAL THERAPY INITIAL EVALUATION ADULT - PHYSICAL ASSIST/NONPHYSICAL ASSIST: SIT/STAND, REHAB EVAL
2 person assist/verbal cues set-up required/2 person assist/nonverbal cues (demo/gestures)/verbal cues

## 2020-10-19 NOTE — OCCUPATIONAL THERAPY INITIAL EVALUATION ADULT - LEVEL OF INDEPENDENCE: CHAIR TO BED, REHAB EVAL
Pt left in bedside chair Not assessed. Pt left sitting in chair near bed. No acute distress. Call bell in reach. All lines intact and precautions maintained. RN, made aware.

## 2020-10-19 NOTE — PROGRESS NOTE ADULT - PROBLEM SELECTOR PLAN 1
no other signs/symptoms suggestive of infection, likely due to stress demargination  - f/u clinically - R femoral neck fracture, s/p R hip arthroplasty on 10/18/20  - postop management per ortho  - pain control, PT/WBAT, periop cefazolin

## 2020-10-19 NOTE — OCCUPATIONAL THERAPY INITIAL EVALUATION ADULT - PATIENT/FAMILY/SIGNIFICANT OTHER GOALS STATEMENT, OT EVAL
Patient would like to become more independent with functional mobility. Patient would like to become more independent with functional mobility and ADL's.

## 2020-10-19 NOTE — PHYSICAL THERAPY INITIAL EVALUATION ADULT - CRITERIA FOR SKILLED THERAPEUTIC INTERVENTIONS
anticipated discharge recommendation/predicted duration of therapy intervention/rehab potential/risk reduction/prevention/impairments found/functional limitations in following categories/therapy frequency

## 2020-10-19 NOTE — PROGRESS NOTE ADULT - PROBLEM SELECTOR PROBLEM 2
Closed displaced intertrochanteric fracture of right femur, initial encounter Leukocytosis, unspecified type

## 2020-10-20 ENCOUNTER — TRANSCRIPTION ENCOUNTER (OUTPATIENT)
Age: 80
End: 2020-10-20

## 2020-10-20 LAB — SARS-COV-2 RNA SPEC QL NAA+PROBE: SIGNIFICANT CHANGE UP

## 2020-10-20 PROCEDURE — 99232 SBSQ HOSP IP/OBS MODERATE 35: CPT

## 2020-10-20 RX ORDER — ASPIRIN/CALCIUM CARB/MAGNESIUM 324 MG
1 TABLET ORAL
Qty: 0 | Refills: 0 | DISCHARGE
Start: 2020-10-20

## 2020-10-20 RX ORDER — TRAMADOL HYDROCHLORIDE 50 MG/1
0.5 TABLET ORAL
Qty: 0 | Refills: 0 | DISCHARGE
Start: 2020-10-20

## 2020-10-20 RX ORDER — POLYETHYLENE GLYCOL 3350 17 G/17G
17 POWDER, FOR SOLUTION ORAL
Qty: 0 | Refills: 0 | DISCHARGE
Start: 2020-10-20

## 2020-10-20 RX ORDER — OXYCODONE HYDROCHLORIDE 5 MG/1
2.5 TABLET ORAL
Qty: 0 | Refills: 0 | DISCHARGE
Start: 2020-10-20

## 2020-10-20 RX ORDER — ASCORBIC ACID 60 MG
1 TABLET,CHEWABLE ORAL
Qty: 0 | Refills: 0 | DISCHARGE
Start: 2020-10-20

## 2020-10-20 RX ORDER — ACETAMINOPHEN 500 MG
3 TABLET ORAL
Qty: 0 | Refills: 0 | DISCHARGE
Start: 2020-10-20

## 2020-10-20 RX ORDER — ASPIRIN/CALCIUM CARB/MAGNESIUM 324 MG
1 TABLET ORAL
Qty: 0 | Refills: 0 | DISCHARGE

## 2020-10-20 RX ORDER — OXYCODONE HYDROCHLORIDE 5 MG/1
1 TABLET ORAL
Qty: 0 | Refills: 0 | DISCHARGE
Start: 2020-10-20

## 2020-10-20 RX ORDER — SENNA PLUS 8.6 MG/1
2 TABLET ORAL
Qty: 0 | Refills: 0 | DISCHARGE
Start: 2020-10-20

## 2020-10-20 RX ADMIN — PANTOPRAZOLE SODIUM 40 MILLIGRAM(S): 20 TABLET, DELAYED RELEASE ORAL at 05:26

## 2020-10-20 RX ADMIN — POLYETHYLENE GLYCOL 3350 17 GRAM(S): 17 POWDER, FOR SOLUTION ORAL at 13:31

## 2020-10-20 RX ADMIN — Medication 500 MILLIGRAM(S): at 13:31

## 2020-10-20 RX ADMIN — SENNA PLUS 2 TABLET(S): 8.6 TABLET ORAL at 21:28

## 2020-10-20 RX ADMIN — Medication 1 TABLET(S): at 13:31

## 2020-10-20 RX ADMIN — Medication 325 MILLIGRAM(S): at 17:51

## 2020-10-20 RX ADMIN — ATORVASTATIN CALCIUM 40 MILLIGRAM(S): 80 TABLET, FILM COATED ORAL at 21:28

## 2020-10-20 RX ADMIN — Medication 325 MILLIGRAM(S): at 05:26

## 2020-10-20 RX ADMIN — Medication 975 MILLIGRAM(S): at 13:31

## 2020-10-20 RX ADMIN — Medication 975 MILLIGRAM(S): at 05:25

## 2020-10-20 RX ADMIN — Medication 975 MILLIGRAM(S): at 21:28

## 2020-10-20 NOTE — DISCHARGE NOTE PROVIDER - NSDCCAREPROVSEEN_GEN_ALL_CORE_FT
Donny Hopkins, Natalio IRWIN Hospitalist Orthopedics, Team  DC Marie, Team  DC Physical Med and Rehab, Team

## 2020-10-20 NOTE — PROGRESS NOTE ADULT - PROBLEM SELECTOR PLAN 4
- with surgery to first carpometacarpal joint of right hand  - x-rays demonstrating osteopenia  - on MVI, Vitamin D supplements

## 2020-10-20 NOTE — DISCHARGE NOTE PROVIDER - CARE PROVIDER_API CALL
Donny Hopkins  ORTHOPAEDIC SURGERY  [address]  [place] [address]  Phone: [phone]  Fax: [phone]  Follow Up Time: 2 weeks

## 2020-10-20 NOTE — PROGRESS NOTE ADULT - PROBLEM SELECTOR PLAN 2
- no other signs/symptoms suggestive of infection, likely due to stress demargination  - f/u clinically

## 2020-10-20 NOTE — DISCHARGE NOTE PROVIDER - NSDCMRMEDTOKEN_GEN_ALL_CORE_FT
aspirin 81 mg oral tablet: 1 tab(s) orally once a day (last dose 19)  atorvastatin 40 mg oral tablet: 1 tab(s) orally once a day  NexIUM: otc 1 tab daily  oxyCODONE-acetaminophen 5 mg-325 mg oral tablet: 1-2 tab(s) orally every 4 hours, As Needed for pain MDD:10  spectrum vitamins: 1 tab(s) orally once a day (last dose 19  VESIcare 5 mg oral tablet: 1 tab(s) orally once a day  Zofran ODT 8 mg oral tablet, disintegratin tab(s) orally every 8 hours, As Needed -for nausea    acetaminophen 325 mg oral tablet: 3 tab(s) orally every 8 hours take standing for 72hours  ascorbic acid 500 mg oral tablet: 1 tab(s) orally once a day  aspirin 325 mg oral tablet: 1 tab(s) orally 2 times a day    atorvastatin 40 mg oral tablet: 1 tab(s) orally once a day  calcium-vitamin D 500 mg-200 intl units (5 mcg) oral tablet: 1 tab(s) orally once a day  Colace 100 mg oral capsule: 1 cap(s) orally 2 times a day  discharge home with over the counter for constipation caused by pain meds  NexIUM: otc 1 tab daily  oxyCODONE: 2.5 milligram(s) orally every 4 hours  as needed for Moderate pain  begin tapering off as pain decreases   oxyCODONE 5 mg oral tablet: 1 tab(s) orally every 4 hours, As needed, Severe Pain (7 - 10)  begin tapering off as pain decreases   polyethylene glycol 3350 oral powder for reconstitution: 17 gram(s) orally once a day  hold for loose BMs  senna oral tablet: 2 tab(s) orally once a day (at bedtime)  discharge home with over the counter for constipation caused by pain meds  hold for loose BMs  traMADol 50 mg oral tablet: 0.5 tab(s) orally 2 times a day standing for 5days  begin tapering off as pain decreases   VESIcare 5 mg oral tablet: 1 tab(s) orally once a day

## 2020-10-20 NOTE — DISCHARGE NOTE NURSING/CASE MANAGEMENT/SOCIAL WORK - PATIENT PORTAL LINK FT
You can access the FollowMyHealth Patient Portal offered by [facility] by registering at the following website: [email or web address]. By joining [facility]’s FollowMyHealth portal, you will also be able to view your health information using other applications (apps) compatible with our system.

## 2020-10-20 NOTE — DISCHARGE NOTE PROVIDER - HOSPITAL COURSE
80year old female is admitted for Right hip femoral neck fracture and underwent Right hip hemiarthroplasty 10/18/2020 without any intraoperative complications.  Patient is doing well and stable for discharge.  Patient is tolerating physical therapy: weight bearing to Right leg, out of bed for gait training, STRICT POSTERIOR HIP PRECAUTIONS and exercises as shown by Physical Therapy.  Keep wound dressing on as is until day of office visit.  Staples/sutures if applicable, to be removed in the office 14 days from date of surgery.  Patient is on Aspirin (MUST TAKE WITH FOOD) for anticoagulation.  Orthopaedic Surgeon Dr. Hopkins would like patient to call private MD/Internist for appointment postop to maintain continuity of care.  Follow up with Dr. Hopkins's office in 1-2 weeks.

## 2020-10-20 NOTE — DISCHARGE NOTE NURSING/CASE MANAGEMENT/SOCIAL WORK - NSDCPNINST_GEN_ALL_CORE
Call MD for any complain of swelling, numbness, tingling to leg, or if pain not relieved after taking pain medications redness or drainage to incision and fever. Elevate surgical leg as instructed.  Take over the counter stool softener to prevent constipation that can be caused by taking pain medication.  Leave dressing on till you see doctor at office.

## 2020-10-20 NOTE — PROGRESS NOTE ADULT - ASSESSMENT
80F with hx of Osteoarthritis, Overactive bladder, Hearing loss (B/L), s/p Hysterectomy, s/p mechanical fall, found to have R femoral neck fracture, s/p R hip arthroplasty on 10/18/20

## 2020-10-20 NOTE — DISCHARGE NOTE PROVIDER - NSDCACTIVITY_GEN_ALL_CORE
Do not drive or operate machinery/No heavy lifting/straining/Walking - Indoors allowed/Walking - Outdoors allowed/Showering allowed/Do not make important decisions/Stairs allowed

## 2020-10-20 NOTE — PROGRESS NOTE ADULT - SUBJECTIVE AND OBJECTIVE BOX
[facility] Division of Hospital Medicine  Aditi Wu MD  Pager (M-F, 8A-5P): [phone]  Other Times:  [phone]    Patient is a 80y old  Female who presents with a chief complaint of Right hip femoral neck fracture  (20 Oct 2020 05:31)    SUBJECTIVE / OVERNIGHT EVENTS: Patient seen and examined. No acute events overnight. Pain well controlled and patient without any complaints.    MEDICATIONS  (STANDING):  acetaminophen   Tablet .. 975 milliGRAM(s) Oral every 8 hours  ascorbic acid 500 milliGRAM(s) Oral daily  aspirin 325 milliGRAM(s) Oral two times a day  atorvastatin 40 milliGRAM(s) Oral at bedtime  calcium carbonate 1250 mG  + Vitamin D (OsCal 500 + D) 1 Tablet(s) Oral daily  pantoprazole    Tablet 40 milliGRAM(s) Oral before breakfast  polyethylene glycol 3350 17 Gram(s) Oral daily  senna 2 Tablet(s) Oral at bedtime  traMADol 25 milliGRAM(s) Oral two times a day    MEDICATIONS  (PRN):  magnesium hydroxide Suspension 30 milliLiter(s) Oral daily PRN Constipation  oxyCODONE    IR 2.5 milliGRAM(s) Oral every 4 hours PRN Moderate Pain (4 - 6)  oxyCODONE    IR 5 milliGRAM(s) Oral every 4 hours PRN Severe Pain (7 - 10)      VITALS:  T(F): 97.9 (10-20-20 @ 09:22), Max: 98.7 (10-19-20 @ 17:43)  HR: 87 (10-20-20 @ 09:22) (76 - 87)  BP: 138/72 (10-20-20 @ 09:30) (113/51 - 156/61)  RR: 17 (10-20-20 @ 09:22) (16 - 18)  SpO2: 98% (10-20-20 @ 09:22)      CAPILLARY BLOOD GLUCOSE    PHYSICAL EXAM:  CONSTITUTIONAL: sitting up in bed, NAD  RESPIRATORY: Normal respiratory effort; lungs are clear to auscultation bilaterally  CARDIOVASCULAR: Regular rate and rhythm, normal S1 and S2, no murmur/rub/gallop  ABDOMEN: Nontender to palpation, normoactive bowel sounds, no rebound/guarding  PSYCH: alert, calm  NEURO: nonfocal, sensation intact   SKIN: RLE dressing intact, c/d/i    LABS:              12.1                 137  | 23   | 11           8.83  >-----------< 161     ------------------------< 131                   35.5                 3.7  | 105  | 0.52                                         Ca 8.0   Mg x     Ph x                      RADIOLOGY & ADDITIONAL TESTS:  Imaging Personally Reviewed: [x] Yes    [ ] Consultant(s) Notes Reviewed:  [x] Care Discussed with Consultants/Other Providers: Orthopedic PA - discussed

## 2020-10-20 NOTE — DISCHARGE NOTE PROVIDER - NSDCCPTREATMENT_GEN_ALL_CORE_FT
PRINCIPAL PROCEDURE  Procedure: Hemiarthroplasty, hip  Findings and Treatment: dictated operative note  weight bearing as tolerated to Right leg  TOTAL HIP PRECAUTIONS POSTERIOR  call Surgeon's office to make appointment for 1-2 weeks from date of surgery Dr. Hopkins [phone]

## 2020-10-20 NOTE — DISCHARGE NOTE PROVIDER - NSDCCPCAREPLAN_GEN_ALL_CORE_FT
PRINCIPAL DISCHARGE DIAGNOSIS  Diagnosis: Closed displaced fracture of right femoral neck  Assessment and Plan of Treatment: 80year old female is admitted for Right hip femoral neck fracture and underwent Right hip hemiarthroplasty 10/18/2020 without any intraoperative complications.  Patient is doing well and stable for discharge.  Patient is tolerating physical therapy: weight bearing to Right leg, out of bed for gait training, STRICT POSTERIOR HIP PRECAUTIONS and exercises as shown by Physical Therapy.  Keep wound dressing on as is until day of office visit.  Staples/sutures if applicable, to be removed in the office 14 days from date of surgery.  Patient is on Aspirin (MUST TAKE WITH FOOD) for anticoagulation.  Orthopaedic Surgeon Dr. Hopkins would like patient to call private MD/Internist for appointment postop to maintain continuity of care.  Follow up with Dr. Hopkins's office in 1-2 weeks.

## 2020-10-20 NOTE — PROGRESS NOTE ADULT - PROBLEM SELECTOR PLAN 1
- R femoral neck fracture, s/p R hip arthroplasty on 10/18/20  - postop management per ortho  - pain control, PT/WBAT, periop cefazolin

## 2020-10-20 NOTE — PROGRESS NOTE ADULT - SUBJECTIVE AND OBJECTIVE BOX
Patient seen and examined.  No acute events overnight.  Pain well controlled.   Walked in room with walker with physical therapy.     Vital Signs Last 24 Hrs  T(C): 36.5 (20 Oct 2020 05:23), Max: 37.1 (19 Oct 2020 17:43)  T(F): 97.7 (20 Oct 2020 05:23), Max: 98.7 (19 Oct 2020 17:43)  HR: 76 (20 Oct 2020 05:23) (76 - 83)  BP: 135/63 (20 Oct 2020 05:23) (113/51 - 135/63)  BP(mean): --  RR: 17 (20 Oct 2020 05:23) (16 - 18)  SpO2: 98% (20 Oct 2020 05:23) (96% - 99%)                          12.1   8.83  )-----------( 161      ( 19 Oct 2020 06:54 )             35.5   10-19    137  |  105  |  11  ----------------------------<  131<H>  3.7   |  23  |  0.52    Ca    8.0<L>      19 Oct 2020 06:54          Exam:  Gen: NAD  RLE:  Dressing c/d/i  Motor: 5/5 EHL/FHL/TA/Gastrocnemius  Sensory: SILT DP/SP/S/S/T nerve distributions  Vascular: 2+ Dorsalis Pedis pulse        Assessment/Plan:  This is a 80yFemale admitted for R FN fx s/p hemiathroplasty. Doing well now POD2.     -pain control  -PT  -WBAT  -OOB  -DVT ppx  -dispo plan

## 2020-10-21 VITALS
RESPIRATION RATE: 18 BRPM | OXYGEN SATURATION: 97 % | TEMPERATURE: 98 F | DIASTOLIC BLOOD PRESSURE: 70 MMHG | HEART RATE: 85 BPM | SYSTOLIC BLOOD PRESSURE: 142 MMHG

## 2020-10-21 PROCEDURE — 99232 SBSQ HOSP IP/OBS MODERATE 35: CPT

## 2020-10-21 PROCEDURE — 99238 HOSP IP/OBS DSCHRG MGMT 30/<: CPT

## 2020-10-21 RX ADMIN — Medication 1 TABLET(S): at 14:04

## 2020-10-21 RX ADMIN — Medication 975 MILLIGRAM(S): at 05:29

## 2020-10-21 RX ADMIN — PANTOPRAZOLE SODIUM 40 MILLIGRAM(S): 20 TABLET, DELAYED RELEASE ORAL at 05:30

## 2020-10-21 RX ADMIN — Medication 500 MILLIGRAM(S): at 14:04

## 2020-10-21 RX ADMIN — Medication 325 MILLIGRAM(S): at 05:30

## 2020-10-21 RX ADMIN — Medication 975 MILLIGRAM(S): at 14:04

## 2020-10-21 NOTE — PROGRESS NOTE ADULT - SUBJECTIVE AND OBJECTIVE BOX
[facility] Division of Hospital Medicine  Aditi Wu MD  Pager (M-F, 8A-5P): [phone]  Other Times:  [phone]    Patient is a 80y old  Female who presents with a chief complaint of Right hip femoral neck fracture  (20 Oct 2020 05:31)    SUBJECTIVE / OVERNIGHT EVENTS: Patient seen and examined. No acute events overnight. Pain well controlled and patient without any complaints.    MEDICATIONS  (STANDING):  acetaminophen   Tablet .. 975 milliGRAM(s) Oral every 8 hours  ascorbic acid 500 milliGRAM(s) Oral daily  aspirin 325 milliGRAM(s) Oral two times a day  atorvastatin 40 milliGRAM(s) Oral at bedtime  calcium carbonate 1250 mG  + Vitamin D (OsCal 500 + D) 1 Tablet(s) Oral daily  pantoprazole    Tablet 40 milliGRAM(s) Oral before breakfast  polyethylene glycol 3350 17 Gram(s) Oral daily  senna 2 Tablet(s) Oral at bedtime  traMADol 25 milliGRAM(s) Oral two times a day    MEDICATIONS  (PRN):  magnesium hydroxide Suspension 30 milliLiter(s) Oral daily PRN Constipation  oxyCODONE    IR 2.5 milliGRAM(s) Oral every 4 hours PRN Moderate Pain (4 - 6)  oxyCODONE    IR 5 milliGRAM(s) Oral every 4 hours PRN Severe Pain (7 - 10)      VITALS:  T(F): 98.1 (10-21-20 @ 09:46), Max: 98.2 (10-20-20 @ 17:47)  HR: 85 (10-21-20 @ 09:46) (76 - 91)  BP: 142/70 (10-21-20 @ 09:46) (93/59 - 142/70)  RR: 18 (10-21-20 @ 09:46) (17 - 18)  SpO2: 97% (10-21-20 @ 09:46)    PHYSICAL EXAM:  CONSTITUTIONAL: sitting up in bed, NAD  RESPIRATORY: Normal respiratory effort; lungs are clear to auscultation bilaterally  CARDIOVASCULAR: Regular rate and rhythm, normal S1 and S2, no murmur/rub/gallop  ABDOMEN: Nontender to palpation, normoactive bowel sounds, no rebound/guarding  PSYCH: alert, calm  NEURO: nonfocal, sensation intact   SKIN: RLE dressing intact, c/d/i    CAPILLARY BLOOD GLUCOSE        LABS:                    RADIOLOGY & ADDITIONAL TESTS:  Imaging Personally Reviewed: [x] Yes    [ ] Consultant(s) Notes Reviewed:  [x] Care Discussed with Consultants/Other Providers: Orthopedic PA - discussed

## 2020-10-21 NOTE — PROGRESS NOTE ADULT - PROBLEM/PLAN-5
Work on self-care-  Hot bath, massage, time with friends, real date, go to [employer], reading a non-medical book, see a movie.    Thanks for seeing me,  Shiloh Logan PA-C  
DISPLAY PLAN FREE TEXT

## 2020-10-21 NOTE — PROGRESS NOTE ADULT - SUBJECTIVE AND OBJECTIVE BOX
Ortho       Patient is seen and examined at bedside.  No significant overnight events. Pain well controlled at moment.      Vital Signs Last 24 Hrs  T(C): 36.4 (21 Oct 2020 05:13), Max: 36.8 (20 Oct 2020 17:47)  T(F): 97.6 (21 Oct 2020 05:13), Max: 98.2 (20 Oct 2020 17:47)  HR: 76 (21 Oct 2020 05:13) (76 - 91)  BP: 132/61 (21 Oct 2020 05:13) (93/59 - 156/61)  BP(mean): --  RR: 18 (21 Oct 2020 05:13) (17 - 18)  SpO2: 98% (21 Oct 2020 05:13) (97% - 98%)    Gen: NAD    RLE: Dressing C/D/I.   Motor grossly intact distal + EHL/FHL/ TA/ GS. Sensation is grossly intact to light touch distal. Compartments are soft. Extremity  warm.          A/P: Patient is a 80y y/o Female s/p right  Hip Hemiarthroplasty, POD # 3  - Pain control / Analgesia  - Inc Spirometry   - Venodynes  - DVT Prophylaxis- ASA BID  - PT / OT  - WBAT / OOB  - Posterior hip precautions  - Rehab planning

## 2020-10-30 PROBLEM — Z00.00 ENCOUNTER FOR PREVENTIVE HEALTH EXAMINATION: Status: ACTIVE | Noted: 2020-10-30

## 2020-11-02 ENCOUNTER — APPOINTMENT (OUTPATIENT)
Dept: ORTHOPEDIC SURGERY | Facility: CLINIC | Age: 80
End: 2020-11-02
Payer: MEDICARE

## 2020-11-02 PROCEDURE — 99024 POSTOP FOLLOW-UP VISIT: CPT

## 2020-11-13 NOTE — HISTORY OF PRESENT ILLNESS
[___ Weeks Post Op] : [unfilled] weeks post op [2] : the patient reports pain that is 2/10 in severity [Chills] : no chills [Fever] : no fever [Clean/Dry/Intact] : clean, dry and intact [Swelling] : not swollen [Neuro Intact] : an unremarkable neurological exam [Vascular Intact] : ~T peripheral vascular exam normal [Negative Renita's] : maneuvers demonstrated a negative Renita's sign [de-identified] : 10/18/2020 - R hip hemiarthroplasty for fracture [de-identified] : Patient is doing well postoperatively.  She is working with therapy and is starting to walk better with assistive device. [de-identified] : On exam incision is clean dry and intact.  She has some mild swelling in the leg.  She has some mild tenderness when moving it but this is improving.  Leg lengths are equal. [de-identified] : X-rays today [de-identified] : 2 weeks postop from right hip hemiarthroplasty for fracture doing well [de-identified] : Continue physical therapy.  I will see her again in a month for first x-ray.  She is weightbearing as tolerated.\par \par If imaging was ordered, the patient was told to make an appointment to review findings right after all imaging is completed.\par \par We discussed risks, benefits and alternatives. Rationale of care was reviewed and all questions were answered. Patient (and family) had all questions answered to her degree of the level of satisfaction. Patient (and family) expressed understanding and interest in proceeding with the plan as outlined.\par \par \par \par \par This note was done with a voice recognition transcription software and any typos are related to this rather than medical error. Surgical risks reviewed. Patient (and family) had all questions answered to an agreeable level of satisfaction. Patient (and family) expressed understanding and interest in proceeding with the plan as outlined.  \par

## 2020-12-07 ENCOUNTER — APPOINTMENT (OUTPATIENT)
Dept: ORTHOPEDIC SURGERY | Facility: CLINIC | Age: 80
End: 2020-12-07
Payer: MEDICARE

## 2020-12-07 VITALS — TEMPERATURE: 97.4 F

## 2020-12-07 PROCEDURE — 99024 POSTOP FOLLOW-UP VISIT: CPT

## 2020-12-07 PROCEDURE — 73502 X-RAY EXAM HIP UNI 2-3 VIEWS: CPT | Mod: RT

## 2020-12-07 NOTE — HISTORY OF PRESENT ILLNESS
[___ Weeks Post Op] : [unfilled] weeks post op [0] : no pain reported [Chills] : no chills [Fever] : no fever [Clean/Dry/Intact] : clean, dry and intact [Swelling] : not swollen [Neuro Intact] : an unremarkable neurological exam [Vascular Intact] : ~T peripheral vascular exam normal [Negative Renita's] : maneuvers demonstrated a negative Renita's sign [Doing Well] : is doing well [Excellent Pain Control] : has excellent pain control [No Sign of Infection] : is showing no signs of infection [de-identified] : 10/18/2020 - R hip hemiarthroplasty for fracture [de-identified] : Patient is doing well postoperatively.  She uses a walker which is starting to progress to a cane. [de-identified] : On exam incision is clean dry and intact.  She has no pain with motion.  She is able to do single-leg stance with minimal support.  She is walking.  Leg lengths are equal.  She is neurovascularly intact. [de-identified] : X-rays today AP pelvis and right hip show the monopolar hemiarthroplasty in good position. [de-identified] : 6 weeks postop from right hip hemiarthroplasty for fracture doing well [de-identified] : Continue physical therapy.  A new prescription was provided today.  I will see her again in 2 months for continued follow-up.\par \par If imaging was ordered, the patient was told to make an appointment to review findings right after all imaging is completed.\par \par We discussed risks, benefits and alternatives. Rationale of care was reviewed and all questions were answered. Patient (and family) had all questions answered to her degree of the level of satisfaction. Patient (and family) expressed understanding and interest in proceeding with the plan as outlined.\par \par \par \par \par This note was done with a voice recognition transcription software and any typos are related to this rather than medical error. Surgical risks reviewed. Patient (and family) had all questions answered to an agreeable level of satisfaction. Patient (and family) expressed understanding and interest in proceeding with the plan as outlined.  \par

## 2021-02-24 ENCOUNTER — APPOINTMENT (OUTPATIENT)
Dept: ORTHOPEDIC SURGERY | Facility: CLINIC | Age: 81
End: 2021-02-24
Payer: MEDICARE

## 2021-02-24 VITALS
HEIGHT: 60 IN | OXYGEN SATURATION: 97 % | SYSTOLIC BLOOD PRESSURE: 134 MMHG | BODY MASS INDEX: 57.52 KG/M2 | DIASTOLIC BLOOD PRESSURE: 78 MMHG | HEART RATE: 79 BPM | WEIGHT: 293 LBS

## 2021-02-24 PROCEDURE — 99072 ADDL SUPL MATRL&STAF TM PHE: CPT

## 2021-02-24 PROCEDURE — 73502 X-RAY EXAM HIP UNI 2-3 VIEWS: CPT | Mod: RT

## 2021-02-24 PROCEDURE — 99213 OFFICE O/P EST LOW 20 MIN: CPT

## 2021-02-25 NOTE — HISTORY OF PRESENT ILLNESS
[FreeTextEntry1] : Patient is doing well 4 months postoperatively.  He still has some muscle weakness and has some difficulty moving but otherwise is walking without a cane.  She is happy with her level of activity. [Stable] : stable [0] : currently ~his/her~ pain is 0 out of 10 [Walking] : worsened by walking

## 2021-02-25 NOTE — REVIEW OF SYSTEMS
[Chills] : no chills [Feeling Tired] : not feeling tired [Joint Pain] : no joint pain [Nl] : Hematologic/Lymphatic

## 2021-02-25 NOTE — DISCUSSION/SUMMARY
[All Questions Answered] : Patient (and family) had all questions answered to an agreeable level of satisfaction [Interested in Proceeding] : Patient (and family) expressed understanding and interest in proceeding with the plan as outlined [de-identified] : Patient is doing well 4 months status post a hemiarthroplasty for fracture.  I can see her back again in a few months or as needed.  I recommend she continue physical therapy or strengthening exercises.  Follow-up for routine arthroplasty.\par \par If imaging was ordered, the patient was told to make an appointment to review findings right after all imaging is completed.\par \par We discussed risks, benefits and alternatives. Rationale of care was reviewed and all questions were answered. Patient (and family) had all questions answered to her degree of the level of satisfaction. Patient (and family) expressed understanding and interest in proceeding with the plan as outlined.\par \par \par \par \par This note was done with a voice recognition transcription software and any typos are related to this rather than medical error. Surgical risks reviewed. Patient (and family) had all questions answered to an agreeable level of satisfaction. Patient (and family) expressed understanding and interest in proceeding with the plan as outlined.  \par

## 2021-02-25 NOTE — PHYSICAL EXAM
[FreeTextEntry1] : On exam the patient stands in good balance.  She is able to do single-leg stance on both the right and the left hip.  She can flex up to 90 degrees and abduct to 20 degrees with good rotation.  She has no instability noted.  She has some mild muscle weakness throughout the entire area and walks better with a cane.  She is neurovascularly intact in her left points are equal. [General Appearance - Well-Appearing] : Well appearing [General Appearance - Well Nourished] : well nourished [Ataxic] : ataxic [Antalgic Gait Right] : not antalgic on the right [Cane] : Uses cane for ambulation [Tenderness] : no tenderness [Swelling] : no swelling [Skin Changes - Describe changes:] : No skin changes noted [Incision Healed - Describe:] : Incision is healed ~M [Normal] : No palpable lymph nodes in the inguinal and popliteal beds [Full ROM Unless otherwise noted:] : Full range of motion unless otherwise noted:

## 2021-02-25 NOTE — DATA REVIEWED
[Imaging Present] : Present [de-identified] : AP pelvis and views of the right hip show the cemented monopolar hemiarthroplasty in good position.  There are no acetabular changes.  There is minimal heterotopic bone off of the greater trochanter.  The implant is intact and stable.

## 2021-04-06 NOTE — PATIENT PROFILE ADULT - MEDICATIONS/VISITS
no lesions,  no deformities,  no traumatic injuries,  no significant scars are present,  chest wall non-tender,  no masses present, breathing is unlabored without accessory muscle use, normal breath sounds
no

## 2021-06-09 DIAGNOSIS — S72.001D FRACTURE OF UNSPECIFIED PART OF NECK OF RIGHT FEMUR, SUBSEQUENT ENCOUNTER FOR CLOSED FRACTURE WITH ROUTINE HEALING: ICD-10-CM

## 2021-09-10 DIAGNOSIS — Z96.641 PRESENCE OF RIGHT ARTIFICIAL HIP JOINT: ICD-10-CM

## 2021-10-08 NOTE — PATIENT PROFILE ADULT - ABILITY TO HEAR (WITH HEARING AID OR HEARING APPLIANCE IF NORMALLY USED):
Mildly to Moderately Impaired: difficulty hearing in some environments or speaker may need to increase volume or speak distinctly show

## 2022-02-21 ENCOUNTER — EMERGENCY (EMERGENCY)
Facility: HOSPITAL | Age: 82
LOS: 1 days | Discharge: HOME | End: 2022-02-21
Attending: EMERGENCY MEDICINE | Admitting: EMERGENCY MEDICINE
Payer: MEDICARE

## 2022-02-21 VITALS
OXYGEN SATURATION: 99 % | HEART RATE: 83 BPM | HEIGHT: 60 IN | RESPIRATION RATE: 18 BRPM | SYSTOLIC BLOOD PRESSURE: 146 MMHG | TEMPERATURE: 97 F | DIASTOLIC BLOOD PRESSURE: 70 MMHG

## 2022-02-21 VITALS
RESPIRATION RATE: 16 BRPM | SYSTOLIC BLOOD PRESSURE: 133 MMHG | OXYGEN SATURATION: 99 % | HEART RATE: 80 BPM | TEMPERATURE: 98 F | DIASTOLIC BLOOD PRESSURE: 70 MMHG

## 2022-02-21 DIAGNOSIS — D21.9 BENIGN NEOPLASM OF CONNECTIVE AND OTHER SOFT TISSUE, UNSPECIFIED: Chronic | ICD-10-CM

## 2022-02-21 LAB
ALBUMIN SERPL ELPH-MCNC: 4.3 G/DL — SIGNIFICANT CHANGE UP (ref 3.3–5)
ALP SERPL-CCNC: 94 U/L — SIGNIFICANT CHANGE UP (ref 40–120)
ALT FLD-CCNC: 21 U/L — SIGNIFICANT CHANGE UP (ref 4–33)
ANION GAP SERPL CALC-SCNC: 9 MMOL/L — SIGNIFICANT CHANGE UP (ref 7–14)
APPEARANCE UR: CLEAR — SIGNIFICANT CHANGE UP
AST SERPL-CCNC: 23 U/L — SIGNIFICANT CHANGE UP (ref 4–32)
BACTERIA # UR AUTO: NEGATIVE — SIGNIFICANT CHANGE UP
BASOPHILS # BLD AUTO: 0.02 K/UL — SIGNIFICANT CHANGE UP (ref 0–0.2)
BASOPHILS NFR BLD AUTO: 0.3 % — SIGNIFICANT CHANGE UP (ref 0–2)
BILIRUB SERPL-MCNC: 0.8 MG/DL — SIGNIFICANT CHANGE UP (ref 0.2–1.2)
BILIRUB UR-MCNC: NEGATIVE — SIGNIFICANT CHANGE UP
BUN SERPL-MCNC: 25 MG/DL — HIGH (ref 7–23)
CALCIUM SERPL-MCNC: 9.1 MG/DL — SIGNIFICANT CHANGE UP (ref 8.4–10.5)
CHLORIDE SERPL-SCNC: 106 MMOL/L — SIGNIFICANT CHANGE UP (ref 98–107)
CK SERPL-CCNC: 58 U/L — SIGNIFICANT CHANGE UP (ref 25–170)
CO2 SERPL-SCNC: 28 MMOL/L — SIGNIFICANT CHANGE UP (ref 22–31)
COLOR SPEC: YELLOW — SIGNIFICANT CHANGE UP
CREAT SERPL-MCNC: 0.59 MG/DL — SIGNIFICANT CHANGE UP (ref 0.5–1.3)
DIFF PNL FLD: NEGATIVE — SIGNIFICANT CHANGE UP
EOSINOPHIL # BLD AUTO: 0.03 K/UL — SIGNIFICANT CHANGE UP (ref 0–0.5)
EOSINOPHIL NFR BLD AUTO: 0.5 % — SIGNIFICANT CHANGE UP (ref 0–6)
EPI CELLS # UR: 1 /HPF — SIGNIFICANT CHANGE UP (ref 0–5)
GLUCOSE SERPL-MCNC: 111 MG/DL — HIGH (ref 70–99)
GLUCOSE UR QL: NEGATIVE — SIGNIFICANT CHANGE UP
HCT VFR BLD CALC: 45.6 % — HIGH (ref 34.5–45)
HGB BLD-MCNC: 15.2 G/DL — SIGNIFICANT CHANGE UP (ref 11.5–15.5)
HYALINE CASTS # UR AUTO: 2 /LPF — SIGNIFICANT CHANGE UP (ref 0–7)
IANC: 4.7 K/UL — SIGNIFICANT CHANGE UP (ref 1.5–8.5)
IMM GRANULOCYTES NFR BLD AUTO: 0.5 % — SIGNIFICANT CHANGE UP (ref 0–1.5)
KETONES UR-MCNC: NEGATIVE — SIGNIFICANT CHANGE UP
LEUKOCYTE ESTERASE UR-ACNC: NEGATIVE — SIGNIFICANT CHANGE UP
LYMPHOCYTES # BLD AUTO: 1.17 K/UL — SIGNIFICANT CHANGE UP (ref 1–3.3)
LYMPHOCYTES # BLD AUTO: 18.3 % — SIGNIFICANT CHANGE UP (ref 13–44)
MAGNESIUM SERPL-MCNC: 2.2 MG/DL — SIGNIFICANT CHANGE UP (ref 1.6–2.6)
MCHC RBC-ENTMCNC: 29.3 PG — SIGNIFICANT CHANGE UP (ref 27–34)
MCHC RBC-ENTMCNC: 33.3 GM/DL — SIGNIFICANT CHANGE UP (ref 32–36)
MCV RBC AUTO: 87.9 FL — SIGNIFICANT CHANGE UP (ref 80–100)
MONOCYTES # BLD AUTO: 0.46 K/UL — SIGNIFICANT CHANGE UP (ref 0–0.9)
MONOCYTES NFR BLD AUTO: 7.2 % — SIGNIFICANT CHANGE UP (ref 2–14)
NEUTROPHILS # BLD AUTO: 4.7 K/UL — SIGNIFICANT CHANGE UP (ref 1.8–7.4)
NEUTROPHILS NFR BLD AUTO: 73.2 % — SIGNIFICANT CHANGE UP (ref 43–77)
NITRITE UR-MCNC: NEGATIVE — SIGNIFICANT CHANGE UP
NRBC # BLD: 0 /100 WBCS — SIGNIFICANT CHANGE UP
NRBC # FLD: 0 K/UL — SIGNIFICANT CHANGE UP
PH UR: 7.5 — SIGNIFICANT CHANGE UP (ref 5–8)
PLATELET # BLD AUTO: 206 K/UL — SIGNIFICANT CHANGE UP (ref 150–400)
POTASSIUM SERPL-MCNC: 4.3 MMOL/L — SIGNIFICANT CHANGE UP (ref 3.5–5.3)
POTASSIUM SERPL-SCNC: 4.3 MMOL/L — SIGNIFICANT CHANGE UP (ref 3.5–5.3)
PROT SERPL-MCNC: 6.4 G/DL — SIGNIFICANT CHANGE UP (ref 6–8.3)
PROT UR-MCNC: ABNORMAL
RBC # BLD: 5.19 M/UL — SIGNIFICANT CHANGE UP (ref 3.8–5.2)
RBC # FLD: 12.8 % — SIGNIFICANT CHANGE UP (ref 10.3–14.5)
RBC CASTS # UR COMP ASSIST: 3 /HPF — SIGNIFICANT CHANGE UP (ref 0–4)
SARS-COV-2 RNA SPEC QL NAA+PROBE: SIGNIFICANT CHANGE UP
SODIUM SERPL-SCNC: 143 MMOL/L — SIGNIFICANT CHANGE UP (ref 135–145)
SP GR SPEC: 1.02 — SIGNIFICANT CHANGE UP (ref 1–1.05)
UROBILINOGEN FLD QL: ABNORMAL
WBC # BLD: 6.41 K/UL — SIGNIFICANT CHANGE UP (ref 3.8–10.5)
WBC # FLD AUTO: 6.41 K/UL — SIGNIFICANT CHANGE UP (ref 3.8–10.5)
WBC UR QL: 2 /HPF — SIGNIFICANT CHANGE UP (ref 0–5)

## 2022-02-21 PROCEDURE — 93010 ELECTROCARDIOGRAM REPORT: CPT

## 2022-02-21 PROCEDURE — G1004: CPT

## 2022-02-21 PROCEDURE — 70450 CT HEAD/BRAIN W/O DYE: CPT | Mod: 26,ME

## 2022-02-21 PROCEDURE — 99285 EMERGENCY DEPT VISIT HI MDM: CPT | Mod: 25

## 2022-02-21 PROCEDURE — 71250 CT THORAX DX C-: CPT | Mod: 26,MG

## 2022-02-21 RX ORDER — ACETAMINOPHEN 500 MG
650 TABLET ORAL ONCE
Refills: 0 | Status: COMPLETED | OUTPATIENT
Start: 2022-02-21 | End: 2022-02-21

## 2022-02-21 RX ORDER — LIDOCAINE 4 G/100G
1 CREAM TOPICAL ONCE
Refills: 0 | Status: COMPLETED | OUTPATIENT
Start: 2022-02-21 | End: 2022-02-21

## 2022-02-21 RX ORDER — LIDOCAINE 4 G/100G
1 CREAM TOPICAL
Qty: 21 | Refills: 0
Start: 2022-02-21 | End: 2022-03-13

## 2022-02-21 RX ADMIN — Medication 650 MILLIGRAM(S): at 13:36

## 2022-02-21 RX ADMIN — Medication 650 MILLIGRAM(S): at 12:47

## 2022-02-21 RX ADMIN — LIDOCAINE 1 PATCH: 4 CREAM TOPICAL at 12:46

## 2022-02-21 NOTE — CHART NOTE - NSCHARTNOTEFT_GEN_A_CORE
This report was requested by: Michael Tobar | Reference #: [id number]    Others' Prescriptions  Patient Name: Nitin MunizeBjennifer Date: 12/26/1997  Address: [address] [place], [place] [address]Lww: Male  Rx Written	Rx Dispensed	Drug	Quantity	Days Supply	Prescriber Name	Prescriber Kelin #	Payment Method	Dispenser  02/03/2022	02/03/2022	oxycodone-acetaminophen 5-325 mg tablet	12	3	[facility]	[id number]	Medicaid	[id number]  01/04/2022	01/06/2022	oxycodone-acetaminophen 5-325 mg tablet	10	2	Krzysztof Muniz	[id number]	Medicaid	[id number]  12/05/2021	12/06/2021	oxycodone hcl (ir) 5 mg tablet	9	3	[facility]	[id number]	Medicaid	[id number]  08/12/2021	08/19/2021	oxycodone-acetaminophen 5-325 mg tablet	28	7	Nimesh Diehl PA-C	[id number]	Medicaid	[id number]  07/18/2021	07/18/2021	oxycodone-acetaminophen 5-325 mg tablet	20	5	Leighton Agrawal	[id number]	Medicaid	[id number]  07/01/2021	07/03/2021	oxycodone-acetaminophen 5-325 mg tablet	12	3	Richelle Chu	[id number]	Medicaid	[id number]  06/28/2021	06/28/2021	oxycodone-acetaminophen 5-325 mg tablet	9	3	[employer])	[id number]	Medicaid	[id number]  06/21/2021	06/21/2021	oxycodone-acetaminophen 5-325 mg tablet	28	7	Nimesh Diehl PA-C	[id number]	Medicaid	[facility][id number]  06/13/2021	06/13/2021	oxycodone-acetaminophen 5-325 mg tablet	12	3	Esperanza Muse	[id number]	Medicaid	[id number]  06/05/2021	06/06/2021	oxycodone-acetaminophen 5-325 mg tablet	12	3	Maxwell Andrade	[id number]	Medicaid	[facility][id number]  06/02/2021	06/02/2021	oxycodone-acetaminophen 5-325 mg tab	9	3	Jean Travis MD	[id number]	Medicaid	[id number]  05/29/2021	05/30/2021	oxycodone-acetaminophen 5-325 mg tablet	16	4	Sean Fisher L	[id number]	Medicaid	[id number]  05/19/2021	05/21/2021	oxycodone-acetaminophen 5-325 mg tablet	28	7	Seven Arenas MD	[id number]	Medicaid	[id number]  05/18/2021	05/19/2021	oxycodone-acetaminophen 5-325 mg tab	12	3	Nicole Nugent B	[id number]	Medicaid	[id number]  05/16/2021	05/16/2021	oxycodone-acetaminophen 5-325 mg tablet	6	1	Kasandra Juliet	[id number]	Medicaid	[id number]  05/12/2021	05/12/2021	oxycodone-acetaminophen 5-325 mg tablet	16	4	Sean Fisher L	[id number]	Medicaid	[id number]  05/07/2021	05/07/2021	oxycodone-acetaminophen 5-325 mg tablet	16	4	Sean Fisher L	[id number]	Medicaid	[facility][id number]  05/03/2021	05/03/2021	oxycodone-acetaminophen 5-325 mg tablet	12	3	Ino Fernandez	[id number]	Medicaid	[id number]  04/29/2021	04/29/2021	oxycodone-acetaminophen 5-325 mg tablet	12	3	[employer])	[id number]	Medicaid	[facility][id number]  04/27/2021	04/27/2021	oxycodone-acetaminophen 5-325 mg tablet	6	1	Abbe Estevez	[id number]	Medicaid	[facility][id number]  04/19/2021	04/19/2021	oxycodone-acetaminophen 5-325 mg tablet	28	7	Nimesh Diehl PA-C	[id number]	Medicaid	[id number]  04/15/2021	04/16/2021	oxycodone-acetaminophen 5-325 mg tablet	12	3	[employer])	[id number]	Medicaid	[id number]  04/09/2021	04/13/2021	oxycodone-acetaminophen 5-325 mg tablet	10	3	Celestina Linares PA	[id number]	Medicaid	[id number]  04/05/2021	04/06/2021	oxycodone-acetaminophen 5-325 mg tab	15	4	Elenita Joe L	[id number]	Medicaid	[id number]  04/02/2021	04/02/2021	oxycodone-acetaminophen 5-325 mg tablet	12	3	[employer] [facility]	[id number]	Medicaid	[id number]  03/23/2021	03/24/2021	oxycodone-acetaminophen 5-325 mg tablet	28	7	Seven Arenas MD	[id number]	Medicaid	[id number]  03/23/2021	03/23/2021	oxycodone-acetaminophen 5-325 mg tab	5	2	Murphy Mora	[id number]	Medicaid	[facility][id number]  03/19/2021	03/19/2021	oxycodone-acetaminophen 5-325 mg tablet	12	3	Bertha Majano S,	[id number]	Medicaid	[id number]  03/15/2021	03/15/2021	oxycodone-acetaminophen 5-325 mg tablet	12	3	Ino Fernandez	[id number]	Medicaid	[id number]  03/12/2021	03/12/2021	oxycodone hcl 5 mg tablet	9	3	[employer])	[id number]	Medicaid	[facility][id number]  03/03/2021	03/03/2021	oxycodone-acetaminophen 5-325 mg tablet	28	7	Nimesh Diehl PA-C	[id number]	Medicaid	[id number]  02/27/2021	02/27/2021	oxycodone-acetaminophen 5-325 mg tablet	16	4	Sean Fisher L	[id number]	Medicaid	[id number]  02/23/2021	02/23/2021	oxycodone-acetaminophen 5-325 mg tablet	10	3	[employer] [facility]	[id number]	Medicaid	[id number]

## 2022-02-21 NOTE — ED PROVIDER NOTE - CLINICAL SUMMARY MEDICAL DECISION MAKING FREE TEXT BOX
Deepa: Fell down last night when she got dizzy after standing up to go to the bathroom. Usually uses cane or walker. Didn't use it at the time. Fell onto L side. C/o pain L lower ribs. No head injury/LOC/neck injury/hip injury. Unable to walk w/ cane here 2/2 L lower rib pain. CT brain and chest. Labs. Pain meds. Re-assess.

## 2022-02-21 NOTE — ED PROVIDER NOTE - NSFOLLOWUPINSTRUCTIONS_ED_ALL_ED_FT
You came to the ER after a fall, 2 rib fractures were seen. 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 Please follow up with a neurosurgeon. Take acetaminophen and naproxen as needed for pain. Use the lidocaine patch daily as needed for pain. Follow up with your primary care doctor.    Communicating hydrocephalus occurs when the flow of cerebrospinal fluid (CSF) is blocked after it exits the ventricles. This type of hydrocephalus may result from a thickening of the arachnoid around the base of the brain, which blocks the flow of CSF from the spinal to the cortical subarachnoid spaces. CSF normally flows unrestricted through the ventricles and into the subarachnoid space. A thickening of the arachnoid layer around the outside of the brain may prevent the free flow of CSF through this pathway. The word “communicating” refers to the fact that CSF can still flow between the ventricles, which remain open.    Rib Fracture    WHAT YOU NEED TO KNOW:    A rib fracture is a crack or break in a rib bone. Rib fractures usually heal within 6 weeks. You should be able to return to normal activities before that time. Do not wrap anything around your body to try to splint your ribs. This can prevent you from taking deep breaths and increases your risk for pneumonia.     DISCHARGE INSTRUCTIONS:  Call 911 for any of the following:   You have trouble breathing.  You have new or increased pain.    Return to the emergency department if:   Your pain does not get better, even after treatment.  You have a fever or a cough.   Contact your healthcare provider if:     You have questions or concerns about your condition or care.      Medicines:   NSAIDs help decrease swelling and pain. NSAIDs are available without a doctor's order. Ask your healthcare provider which medicine is right for you. Ask how much to take and when to take it. Take as directed. NSAIDs can cause stomach bleeding and kidney problems if not taken correctly.  Prescription pain medicine may be given. Ask your healthcare provider how to take this medicine safely. Some prescription pain medicines contain acetaminophen. Do not take other medicines that contain acetaminophen without talking to your healthcare provider. Too much acetaminophen may cause liver damage. Prescription pain medicine may cause constipation. Ask your healthcare provider how to prevent or treat constipation.   Take your medicine as directed. Contact your healthcare provider if you think your medicine is not helping or if you have side effects. Tell him or her if you are allergic to any medicine. Keep a list of the medicines, vitamins, and herbs you take. Include the amounts, and when and why you take them. Bring the list or the pill bottles to follow-up visits. Carry your medicine list with you in case of an emergency.  Follow up with your healthcare provider as directed: Write down your questions so you remember to ask them during your visits.    Deep breathing: Deep breathing will decrease your risk for pneumonia. Hug a pillow on the injured side while doing this exercise, to decrease pain. Take a deep breath and hold it for as long as possible. You should let the air out and then cough strongly. Deep breaths help open your airway. You may be given an incentive spirometer to help take deep breaths. Put the plastic piece in your mouth. Take a slow, deep breath. You should then let the air out and cough. Repeat these steps 10 times every hour.    Rest: Rest and limit activity to decrease swelling and pain, and allow your injury to heal. Avoid activities that may cause more pain or damage to your ribs such as, pulling, pushing, and lifting. As your pain decreases, begin movements slowly. Take short walks between rest periods.    Ice: Apply ice on the fractured area for 15 to 20 minutes every hour or as directed. Use an ice pack or put crushed ice in a plastic bag. Cover it with a towel. Ice helps prevent tissue damage and decreases swelling and pain.

## 2022-02-21 NOTE — ED PROVIDER NOTE - OBJECTIVE STATEMENT
80 y/o F pmh Right hip femoral neck fracture and underwent Right hip hemiarthroplasty 10/18/2020, arthritis, HLD p/w unwitnessed fall from home. 82 y/o F pmh Right hip femoral neck fracture and underwent Right hip hemiarthroplasty 10/18/2020, arthritis, HLD p/w unwitnessed fall from home. Pt states she felt dizzy last night attempting to get up to use the bathroom. pt typically walks with a walker/cane, did not use one last night. Son at bedside reports a neighbor told him he heard a thump around midnight. Pt c/o L lower rib pain. Denies fever, chills, sob, abd pain, n/v/d, dysuria.

## 2022-02-21 NOTE — ED ADULT NURSE NOTE - NS ED PATIENT SAFETY CONCERN
Form completed and sent to Physician's Office electronically via [employer] for review and signature. Completed form will be faxed to Ashley Gillespie at [phone] attn Prateek Castillo on file 11/26/18. No

## 2022-02-21 NOTE — ED ADULT NURSE NOTE - OBJECTIVE STATEMENT
Pt AOX4, hard-of-hearing; was found on carpeted floor of bedroom by niurka, pt states she fell last night; has abrasions to bilateral elbows, skin intact; pt c/o pain to Left ribcage area; no bruising noted to ribcage but pt tender on palpation, no SOB, bilateral legs of equal length, no deformities, pt walks at home with a walker and cane, states she did not use her walker last night. 20G IV placed Left AC, labs drawn and sent, awaiting MD orders.

## 2022-02-21 NOTE — ED PROVIDER NOTE - CARDIAC, MLM
Normal rate, regular rhythm.  Heart sounds S1, S2.  No murmurs, rubs or gallops. L anterior inferior chest wall tenderness

## 2022-02-21 NOTE — ED PROVIDER NOTE - ATTENDING CONTRIBUTION TO CARE
I performed a face-to-face evaluation of the patient and performed a history and physical examination. I agree with the history and physical examination. If this was a PA visit, I personally saw the patient with the PA and performed a substantive portion of the visit including all aspects of the medical decision making.    Fell down last night when she got dizzy after standing up to go to the bathroom. Usually uses cane or walker. Didn't use it at the time. Fell onto L side. C/o pain L lower ribs. No head injury/LOC/neck injury/hip injury. Unable to walk w/ cane here 2/2 L lower rib pain. CT brain and chest. Labs. Pain meds. Re-assess.

## 2022-02-21 NOTE — ED PROVIDER NOTE - ENMT, MLM
Airway patent, Nasal mucosa clear. Mouth with normal mucosa. Throat has no vesicles, no oropharyngeal exudates and uvula is midline.  dried blood around mouth

## 2022-02-21 NOTE — ED PROVIDER NOTE - PROGRESS NOTE DETAILS
TARI Morris: Discussed results with pt-fractured ribs and communicating hydrocephalus. Pt's daughter at bedside. States daughter will be taking care of her.   The patient was given verbal and written discharge instructions. Specifically, instructions when to return to the ED and when to seek follow-up from their pcp was discussed. Any specialty follow-up was discussed, including how to make an appointment.  Any necessary referral lists provided.  Instructions were discussed in simple, plain language and was understood by the patient. The patient understands that should their symptoms worsen or any new symptoms arise, they should return to the ED immediately for further evaluation. All pt's questions were answered. Patient verbalizes understanding.

## 2022-02-21 NOTE — ED PROVIDER NOTE - PATIENT PORTAL LINK FT
You can access the FollowMyHealth Patient Portal offered by [facility] by registering at the following website: [email or web address]. By joining [facility]’s FollowMyHealth portal, you will also be able to view your health information using other applications (apps) compatible with our system.

## 2022-02-21 NOTE — ED ADULT NURSE NOTE - NSIMPLEMENTINTERV_GEN_ALL_ED
Implemented All Fall with Harm Risk Interventions:  [place] to call system. Call bell, personal items and telephone within reach. Instruct patient to call for assistance. Room bathroom lighting operational. Non-slip footwear when patient is off stretcher. Physically safe environment: no spills, clutter or unnecessary equipment. Stretcher in lowest position, wheels locked, appropriate side rails in place. Provide visual cue, wrist band, yellow gown, etc. Monitor gait and stability. Monitor for mental status changes and reorient to person, place, and time. Review medications for side effects contributing to fall risk. Reinforce activity limits and safety measures with patient and family. Provide visual clues: red socks.

## 2022-02-21 NOTE — ED ADULT TRIAGE NOTE - CHIEF COMPLAINT QUOTE
Pt brought in by EMS from home s/p mechanical fall, pt was found on the floor by her son. Pts son states she may have fell early in the AM. Pt complaining of L side pain. Pt denies use of blood thinners.

## 2022-02-22 LAB
CULTURE RESULTS: NO GROWTH — SIGNIFICANT CHANGE UP
SPECIMEN SOURCE: SIGNIFICANT CHANGE UP

## 2022-04-22 NOTE — ED ADULT NURSE NOTE - NSFALLRSKASSESSTYPE_ED_ALL_ED
Alex Blanc cnm calls back, reviewed case. Discharge orders received.
Dhaval Still [facility] notified of pt c/o vaginal bleeding that was bright red last night but brown blood when she wipes none on underwear just when she wipes. Notified pt had some cramping last night but none at current time. Reviewed UA results. States will call back.
Patient and her mom verbalize understanding of discharge instructions, with special attention to modified bed rest and vaginal rest. Patient and her mom discussing means of having patient's toddler cared for if needed and both state they will be able to make arrangements.
Patient discharged by wheelchair accompanied by her mom.
Pt to ob unit from home. States that she has had vaginal bleeding that started last night. States it was red last night and brown today. Pt states that she doesn't bleed through underwear it is just there when she wipes. States is not currently having any abd. Cramping. States she was cramping last night.
Initial (On Arrival)

## 2022-05-02 NOTE — ED ADULT NURSE NOTE - CAS TRG GENERAL NORM CIRC DET
CHIEF COMPLAINT:  Chief Complaint   Patient presents with   • Dizziness     On 2 occasions has felt like she is going to pass out.    • Other     Here alone     HISTORY OF PRESENT ILLNESS:  Nicol Worthy is a 34 year old female who presents today for 2 episodes of dizziness.  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  Was confused where something was.  Rested and improved.  Again this morning felt hot again, felt like she could pass out but did not.  Has not passed out.  No headaches.  Felt some nausea, no vomiting.  Has some blurry vision Friday briefly but resolved.  Some diarrhea this morning.  No chest pain or shortness of breath, reports history of asthma.  History of hypothyroidism and depression, started venlafaxine 1 month ago.  Last TSH 11/21, normal.    I have reviewed the patient's medications and allergies, past medical, surgical, social and family history, updating these as appropriate.  See Histories section of the EMR for a display of this information.    ALLERGIES:   Allergen Reactions   • Norelgestromin-Eth Estradiol Other (See Comments)     Ortho evra patch-breaking out from patch   • Tetracycline HIVES   • Theophylline GI UPSET     vomitting       PRIMARY CARE PROVIDER:  Mona Barrett DO    PHYSICAL EXAM:  Vitals:    05/02/22 1248   BP: (!) 157/95   Pulse: 63   Resp: 14   Temp: 98.2 °F (36.8 °C)   TempSrc: Temporal   SpO2: 100%   Weight: 73.5 kg (162 lb 0.6 oz)   LMP: 04/24/2022   GENERAL: Well developed, well nourished, alert, cooperative female.  Appears in no acute distress.  Oriented x 3.  Speech understandable.  No abnormal behaviors or affect.  HEAD: Erect and midline.  EYES: No tearing.  Conjunctivae pink without discharge.  Pupils equal round and reactive to lightn, extraocular movements intact.  EARS:  Canals clear bilaterally.  Tympanic membranes grey and translucent.   NOSE:  No rhinorrhea seen.   MOUTH:  Buccal mucosa pink and moist.  No lesions.  Tongue midline.  Uvula midline with elevation of soft palate.  Oropharynx without erythema or edema.  NECK: Supple, non-tender.  No lymphadenopathy of the head or neck.  CARDIAC:  S1 and S2 heard.  Rate and rhythm regular.  LUNGS: Even, unlabored respirations.  Clear to auscultation of anterior and posterior lungs fields without wheezing, rhonchi or crackles.  NEUROLOGIC:  Muscles of facial expression with equal movement.  Hearing appropriate to conversation.  Able to shrug shoulders against resistance.  Gait within normal limits.  Good coordination on finger to nose.  No involuntary movements.  Upper and lower extremities with equal and appropriate strength.   Reflexes brisk.      DIAGNOSTICS:  Walk In on 05/02/2022   Component Date Value Ref Range Status   • Sodium 05/02/2022 137  135 - 145 mmol/L Final   • Potassium 05/02/2022 3.8  3.4 - 5.1 mmol/L Final   • Chloride 05/02/2022 104  98 - 107 mmol/L Final   • Carbon Dioxide 05/02/2022 29  21 - 32 mmol/L Final   • Anion Gap 05/02/2022 8 (A) 10 - 20 mmol/L Final   • Glucose 05/02/2022 88  70 - 99 mg/dL Final   • BUN 05/02/2022 10  6 - 20 mg/dL Final   • Creatinine 05/02/2022 0.70  0.51 - 0.95 mg/dL Final   • Glomerular Filtration Rate 05/02/2022 >90  >=60 Final    eGFR results = or >60 mL/min/1.73m2 = Normal kidney function. Estimated GFR calculated using the CKD-EPI-R (2021) equation that does not include race in the creatinine calculation.   • BUN/ Creatinine Ratio 05/02/2022 14  7 - 25 Final   • Calcium 05/02/2022 8.8  8.4 - 10.2 mg/dL Final   • WBC 05/02/2022 7.6  4.2 - 11.0 K/mcL Final   • RBC 05/02/2022 4.69  4.00 - 5.20 mil/mcL Final   • HGB 05/02/2022 14.1  12.0 - 15.5 g/dL Final   • HCT 05/02/2022 43.0  36.0 - 46.5 % Final   • MCV 05/02/2022 91.7  78.0 - 100.0 fl Final   • MCH 05/02/2022 30.1  26.0 - 34.0 pg Final   • MCHC 05/02/2022 32.8  32.0 - 36.5 g/dL Final   • RDW-CV 05/02/2022 12.1  11.0 - 15.0 % Final   • RDW-SD 05/02/2022 41.9  39.0 - 50.0 fL Final   • PLT  05/02/2022 301  140 - 450 K/mcL Final   • Neutrophil, Percent 05/02/2022 74  % Final   • Lymphocytes, Percent 05/02/2022 16  % Final   • Mono, Percent 05/02/2022 7  % Final   • Eosinophils, Percent 05/02/2022 2  % Final   • Basophils, Percent 05/02/2022 1  % Final   • Immature Granulocytes 05/02/2022 0  % Final   • Absolute Neutrophils 05/02/2022 5.6  1.8 - 7.7 K/mcL Final   • Absolute Lymphocytes 05/02/2022 1.2  1.0 - 4.8 K/mcL Final   • Absolute Monocytes 05/02/2022 0.5  0.3 - 0.9 K/mcL Final   • Absolute Eosinophils  05/02/2022 0.2  0.0 - 0.5 K/mcL Final   • Absolute Basophils 05/02/2022 0.0  0.0 - 0.3 K/mcL Final   • Absolute Immmature Granulocytes 05/02/2022 0.0  0.0 - 0.2 K/mcL Final     ASSESSMENT:  Nicol was seen today for dizziness and other.    Diagnoses and all orders for this visit:    Vasovagal near syncope    Dizziness  -     BASIC METABOLIC PANEL  -     CBC WITH DIFFERENTIAL  -     THYROID STIMULATING HORMONE  -     ELECTROCARDIOGRAM 12-LEAD      PLAN:  Reports 2 episodes of near syncope.  Could be vasovagal, discussed other possible causes.  Recommended EKG/labs today, she was agreeable.  EKG showed normal sinus rhythm, will be verified by physician.  Labs today including CBC and BMP, unremarkable.  TSH pending, will contact her when available.  Recommended conservative treatment, recommend she push fluids, rest, change positions slowly. Recommended close follow up with PCP for persistent symptoms. Signs and symptoms to seek emergency treatment discussed.    The patient's questions were answered and she agrees with this plan.  She was encouraged to call the clinic with further questions or concerns. Written instructions were given.               Strong peripheral pulses

## 2022-09-20 ENCOUNTER — INPATIENT (INPATIENT)
Facility: HOSPITAL | Age: 82
LOS: 16 days | Discharge: NURSING, REHAB OR LONG-TERM CARE | End: 2022-10-07
Attending: STUDENT IN AN ORGANIZED HEALTH CARE EDUCATION/TRAINING PROGRAM | Admitting: STUDENT IN AN ORGANIZED HEALTH CARE EDUCATION/TRAINING PROGRAM

## 2022-09-20 VITALS
SYSTOLIC BLOOD PRESSURE: 138 MMHG | TEMPERATURE: 97 F | OXYGEN SATURATION: 98 % | RESPIRATION RATE: 16 BRPM | HEART RATE: 74 BPM | HEIGHT: 60 IN | DIASTOLIC BLOOD PRESSURE: 52 MMHG

## 2022-09-20 DIAGNOSIS — D21.9 BENIGN NEOPLASM OF CONNECTIVE AND OTHER SOFT TISSUE, UNSPECIFIED: Chronic | ICD-10-CM

## 2022-09-20 PROCEDURE — 99285 EMERGENCY DEPT VISIT HI MDM: CPT | Mod: 25

## 2022-09-20 PROCEDURE — 93010 ELECTROCARDIOGRAM REPORT: CPT

## 2022-09-20 NOTE — ED ADULT TRIAGE NOTE - HISTORY OF COVID-19 VACCINATION
Date of Visit:  11/5/2021  Patient Name:  Mau Faustin  Medical Record Number:  [id number]  YOB: 1980  Primary Physician:  Fredi Caraballo DO     Chief Complaint:  Chief Complaint   Patient presents with   • Physical        SUBJECTIVE:  Mau Faustin is a 41 year old male who presented requesting evaluation for     Had appointment with psychiatry  Had weaned off the buspar and continues the lexapro  And started the seroquel with psychiatry  Was put on the seroquel to help with the mind racing.    He is sleeping better with the seroquel, falls asleep faster  Sleeps for longer period of time before the mind is racing  Resting well  In regards to the anxiety, still has ups and downs with the anxiety levels.    REVIEW OF SYSTEMS:  Pertinent positive from HPI with below Positives and Negatives  Constitutional:  [x] no fever  [] no chills  [] no weakness  [] no fatigue    Skin:  [x] no rash  [] no bruising  [] no wound  [] no lesion  Head:  [] no head injury   [] no headache   [] no dizziness  Eyes:  [] no vision changes   [] wears glasses or contacts   [] no redness   [] no drainage  Ears:  [] no tinnitus   [] no earache   [] hearing aids   [] no hearing changes  Nose:  [] no stuffiness   [] no nosebleeds   [] no drainage  Throat:  [] no soreness   [] no dry mouth   [] no hoarseness  Neck:  [] no swollen glands   [] no pain   [] no stiffness  Respiratory:  [x] no cough  [] no wheezing  [] no shortness of breath [] no hemoptysis   Cardiovascular:  [x] no chest pain  [] no chest pressure  [] no palpitations  [] diaphoresis   Gastrointestinal:  [] no nausea [] no vomiting  [] no diarrhea [x] no abdominal pain  [] no heartburn  Genitourinary:  [] no urgency  [] no frequency  [] no hematuria  []no flank pain  Extremities:  [x] no swelling  [] no joint pain  Neurologic:  [] no change in sensory  [] no change in motor function  [] no headache  [] no change in speech  Endocrine:  [] no heat or cold intolerance  [] no  Yes abnormal weight loss or gain  [] no excessive sweating  Hematological:  [x] no bleeding  [] no bruising  [] no adenopathy  Psychiatric:  [x] no change in affect  [] no change in mentation  [] no sleep disturbance     Past Medical History:   Diagnosis Date   • Depression    • Dyslipidemia    • Kidney stone        Past Surgical History:   Procedure Laterality Date   • Foot surgery     • Vasectomy         Medications:  Current Outpatient Medications   Medication   • QUEtiapine (SEROquel) 50 MG tablet   • escitalopram (LEXAPRO) 20 MG tablet   • busPIRone (BUSPAR) 7.5 MG tablet     Current Facility-Administered Medications   Medication   • acetaminophen (TYLENOL) tablet 500 mg   • albuterol (VENTOLIN) nebulizer 2.5 mg       ALLERGIES:  No Known Allergies    Family History   Problem Relation Age of Onset   • Depression Mother    • Hypertension Father    • Cancer, Prostate Father         State 4       Social History:  Social History     Tobacco Use   • Smoking status: Never Smoker   • Smokeless tobacco: Never Used   Substance Use Topics   • Alcohol use: Yes     Alcohol/week: 0.0 standard drinks     Comment: rare       Objective:  Vital Most Recent Value   Temperature 99 °F (37.2 °C)   Pulse 63   Respiratory 20   Blood Pressure 114/74   Pulse Oximetry    O2      Vital Most Recent Value   Weight 95.7 kg (211 lb)   Height 5' 7\" (170.2 cm)       BP Readings from Last 3 Encounters:   11/05/21 114/74   10/15/21 102/62   01/22/21 130/61     Wt Readings from Last 3 Encounters:   11/05/21 95.7 kg (211 lb)   10/15/21 96 kg (211 lb 10.3 oz)   01/22/21 106.5 kg (234 lb 12.6 oz)       PHYSICAL EXAM:    General:  Alert, cooperative  Neck:  Trachea is midline.  No adenopathy.  Eyes:  Normal conjunctivae and sclerae.    ENT:  Mucous membranes are moist.  Normal tympanic membranes and external auditory canals bilaterally.  No pharyngeal erythema or exudate.    Cardiovascular:  Regular rate and rhythm without murmur, +S1+S2.  Respiratory:   Normal respiratory effort.  Clear to auscultation.  No wheezes, rales or rhonchi.  Gastrointestinal:  Soft and non tender.  Normal bowel sounds.  No rebound, rigidity or guarding.  : no testicular masses b/l  Musculoskeletal:  No edema b/l LE  Neuro:  Oriented x4.  .  Psychiatric:  Cooperative.     Lab Review:  Lab Results   Component Value Date    SODIUM 139 12/10/2020    POTASSIUM 3.9 12/10/2020    CHLORIDE 103 12/10/2020    CO2 28 12/10/2020    BUN 13 12/10/2020    CREATININE 1.06 12/10/2020    WBC 9.5 12/10/2020    HGB 13.7 12/10/2020    HCT 41.9 12/10/2020     12/10/2020    TSH 1.253 09/30/2016    CHOLESTEROL 167 10/19/2019    HDL 35 (L) 10/19/2019    CHOHDL 4.8 (H) 10/19/2019    TRIGLYCERIDE 130 10/19/2019    CALCLDL 106 10/19/2019    PSA 0.21 12/24/2018     No results found for: 5GLY    Assessment & Plan:  Mau was seen today for physical.    Diagnoses and all orders for this visit:    Well adult exam  -     CBC WITH DIFFERENTIAL; Future  -     COMPREHENSIVE METABOLIC PANEL; Future  -     THYROID STIMULATING HORMONE REFLEX; Future  -     LIPID PANEL WITH REFLEX; Future  -     VITAMIN D -25 HYDROXY; Future  Updating laboratory tests, Well exam performed today    Depression with anxiety  MINH (generalized anxiety disorder)  -     hydrOXYzine (ATARAX) 25 MG tablet; Take 1 tablet by mouth 3 times daily as needed for Anxiety.  Continues to follow with Psychiatry, he was recently started on Seroquel and has had significant improvement in sleeping, he is happy with the addition of the Seroquel, continues on the Lexapro, we discussed how the lexapro might be affecting his sexual function, patient will also be added hydroxyzine to try to help with breakthrough anxiety   Follow back up in 3 months     Patient still has social stressors that are being modified, is still building his house, currently he lives in a 2 bed condo with his parents and multiple other family members, believe there more than 11 people  living in the 2 bed [facility], patient's main stress is still work and home, his sexual dysfunction has been bothersome for his relationship with his wife, this is probably related to insomnia and depression and medications but we also discussed potentially checking testosterone levels and considering Viagra in the future    Body mass index is 33.05 kg/m².    BMI ASSESSMENT/PLAN:  Patient is obese.    Journal food intake daily       Patient Care Team:  Fredi Caraballo DO as PCP - General (Family Practice)    DO Patience Palumbo Physician:  Family Medicine  [address] [place] [address]  11/5/2021

## 2022-09-20 NOTE — ED ADULT TRIAGE NOTE - CHIEF COMPLAINT QUOTE
82 year brought in for  fall this morning, unknown  if pt hit head, pt has been more confuse since yesterday at 7pm, decreased po intake since yesterday, baseline A*Ox3 with periods of confusion. Pt currently alert to self.  Pt not on blood thinners. PMHx: HTN, hydrocephalus. 82 year brought in for  fall this morning, unknown  if pt hit head, pt has been more confuse since yesterday at 7pm, decreased po intake since yesterday, baseline A*Ox3 with periods of confusion. Pt currently alert to self.  Pt not on blood thinners. PMHx: HTN, hydrocephalus. Pt arrive with 22g in left a/c with LF KVO.

## 2022-09-21 ENCOUNTER — TRANSCRIPTION ENCOUNTER (OUTPATIENT)
Age: 82
End: 2022-09-21

## 2022-09-21 DIAGNOSIS — M19.90 UNSPECIFIED OSTEOARTHRITIS, UNSPECIFIED SITE: ICD-10-CM

## 2022-09-21 DIAGNOSIS — R41.82 ALTERED MENTAL STATUS, UNSPECIFIED: ICD-10-CM

## 2022-09-21 DIAGNOSIS — E78.5 HYPERLIPIDEMIA, UNSPECIFIED: ICD-10-CM

## 2022-09-21 DIAGNOSIS — Z29.9 ENCOUNTER FOR PROPHYLACTIC MEASURES, UNSPECIFIED: ICD-10-CM

## 2022-09-21 DIAGNOSIS — R33.9 RETENTION OF URINE, UNSPECIFIED: ICD-10-CM

## 2022-09-21 DIAGNOSIS — G93.49 OTHER ENCEPHALOPATHY: ICD-10-CM

## 2022-09-21 LAB
A1C WITH ESTIMATED AVERAGE GLUCOSE RESULT: 5.6 % — SIGNIFICANT CHANGE UP (ref 4–5.6)
ALBUMIN SERPL ELPH-MCNC: 4.5 G/DL — SIGNIFICANT CHANGE UP (ref 3.3–5)
ALP SERPL-CCNC: 83 U/L — SIGNIFICANT CHANGE UP (ref 40–120)
ALT FLD-CCNC: 13 U/L — SIGNIFICANT CHANGE UP (ref 4–33)
AMMONIA BLD-MCNC: 17 UMOL/L — SIGNIFICANT CHANGE UP (ref 11–55)
ANION GAP SERPL CALC-SCNC: 14 MMOL/L — SIGNIFICANT CHANGE UP (ref 7–14)
APPEARANCE UR: CLEAR — SIGNIFICANT CHANGE UP
AST SERPL-CCNC: 27 U/L — SIGNIFICANT CHANGE UP (ref 4–32)
B PERT DNA SPEC QL NAA+PROBE: SIGNIFICANT CHANGE UP
B PERT+PARAPERT DNA PNL SPEC NAA+PROBE: SIGNIFICANT CHANGE UP
BASOPHILS # BLD AUTO: 0.02 K/UL — SIGNIFICANT CHANGE UP (ref 0–0.2)
BASOPHILS NFR BLD AUTO: 0.3 % — SIGNIFICANT CHANGE UP (ref 0–2)
BILIRUB SERPL-MCNC: 1.2 MG/DL — SIGNIFICANT CHANGE UP (ref 0.2–1.2)
BILIRUB UR-MCNC: NEGATIVE — SIGNIFICANT CHANGE UP
BLOOD GAS VENOUS COMPREHENSIVE RESULT: SIGNIFICANT CHANGE UP
BORDETELLA PARAPERTUSSIS (RAPRVP): SIGNIFICANT CHANGE UP
BUN SERPL-MCNC: 18 MG/DL — SIGNIFICANT CHANGE UP (ref 7–23)
C PNEUM DNA SPEC QL NAA+PROBE: SIGNIFICANT CHANGE UP
CALCIUM SERPL-MCNC: 9.1 MG/DL — SIGNIFICANT CHANGE UP (ref 8.4–10.5)
CHLORIDE SERPL-SCNC: 101 MMOL/L — SIGNIFICANT CHANGE UP (ref 98–107)
CK SERPL-CCNC: 384 U/L — HIGH (ref 25–170)
CO2 SERPL-SCNC: 21 MMOL/L — LOW (ref 22–31)
COLOR SPEC: SIGNIFICANT CHANGE UP
CREAT SERPL-MCNC: 0.4 MG/DL — LOW (ref 0.5–1.3)
DIFF PNL FLD: NEGATIVE — SIGNIFICANT CHANGE UP
EGFR: 98 ML/MIN/1.73M2 — SIGNIFICANT CHANGE UP
EOSINOPHIL # BLD AUTO: 0.01 K/UL — SIGNIFICANT CHANGE UP (ref 0–0.5)
EOSINOPHIL NFR BLD AUTO: 0.1 % — SIGNIFICANT CHANGE UP (ref 0–6)
ESTIMATED AVERAGE GLUCOSE: 114 — SIGNIFICANT CHANGE UP
FLUAV SUBTYP SPEC NAA+PROBE: SIGNIFICANT CHANGE UP
FLUBV RNA SPEC QL NAA+PROBE: SIGNIFICANT CHANGE UP
GLUCOSE BLDC GLUCOMTR-MCNC: 138 MG/DL — HIGH (ref 70–99)
GLUCOSE BLDC GLUCOMTR-MCNC: 138 MG/DL — HIGH (ref 70–99)
GLUCOSE BLDC GLUCOMTR-MCNC: 145 MG/DL — HIGH (ref 70–99)
GLUCOSE SERPL-MCNC: 148 MG/DL — HIGH (ref 70–99)
GLUCOSE UR QL: ABNORMAL
HADV DNA SPEC QL NAA+PROBE: SIGNIFICANT CHANGE UP
HCOV 229E RNA SPEC QL NAA+PROBE: SIGNIFICANT CHANGE UP
HCOV HKU1 RNA SPEC QL NAA+PROBE: SIGNIFICANT CHANGE UP
HCOV NL63 RNA SPEC QL NAA+PROBE: SIGNIFICANT CHANGE UP
HCOV OC43 RNA SPEC QL NAA+PROBE: SIGNIFICANT CHANGE UP
HCT VFR BLD CALC: 45.9 % — HIGH (ref 34.5–45)
HGB BLD-MCNC: 16 G/DL — HIGH (ref 11.5–15.5)
HMPV RNA SPEC QL NAA+PROBE: SIGNIFICANT CHANGE UP
HPIV1 RNA SPEC QL NAA+PROBE: SIGNIFICANT CHANGE UP
HPIV2 RNA SPEC QL NAA+PROBE: SIGNIFICANT CHANGE UP
HPIV3 RNA SPEC QL NAA+PROBE: SIGNIFICANT CHANGE UP
HPIV4 RNA SPEC QL NAA+PROBE: SIGNIFICANT CHANGE UP
IANC: 5.56 K/UL — SIGNIFICANT CHANGE UP (ref 1.8–7.4)
IMM GRANULOCYTES NFR BLD AUTO: 0.7 % — SIGNIFICANT CHANGE UP (ref 0–0.9)
KETONES UR-MCNC: ABNORMAL
LACTATE SERPL-SCNC: 2.4 MMOL/L — HIGH (ref 0.5–2)
LEUKOCYTE ESTERASE UR-ACNC: NEGATIVE — SIGNIFICANT CHANGE UP
LYMPHOCYTES # BLD AUTO: 0.87 K/UL — LOW (ref 1–3.3)
LYMPHOCYTES # BLD AUTO: 12.8 % — LOW (ref 13–44)
M PNEUMO DNA SPEC QL NAA+PROBE: SIGNIFICANT CHANGE UP
MAGNESIUM SERPL-MCNC: 2 MG/DL — SIGNIFICANT CHANGE UP (ref 1.6–2.6)
MCHC RBC-ENTMCNC: 30 PG — SIGNIFICANT CHANGE UP (ref 27–34)
MCHC RBC-ENTMCNC: 34.9 GM/DL — SIGNIFICANT CHANGE UP (ref 32–36)
MCV RBC AUTO: 86.1 FL — SIGNIFICANT CHANGE UP (ref 80–100)
MONOCYTES # BLD AUTO: 0.3 K/UL — SIGNIFICANT CHANGE UP (ref 0–0.9)
MONOCYTES NFR BLD AUTO: 4.4 % — SIGNIFICANT CHANGE UP (ref 2–14)
NEUTROPHILS # BLD AUTO: 5.56 K/UL — SIGNIFICANT CHANGE UP (ref 1.8–7.4)
NEUTROPHILS NFR BLD AUTO: 81.7 % — HIGH (ref 43–77)
NITRITE UR-MCNC: NEGATIVE — SIGNIFICANT CHANGE UP
NRBC # BLD: 0 /100 WBCS — SIGNIFICANT CHANGE UP (ref 0–0)
NRBC # FLD: 0 K/UL — SIGNIFICANT CHANGE UP (ref 0–0)
PH UR: 7 — SIGNIFICANT CHANGE UP (ref 5–8)
PLATELET # BLD AUTO: 173 K/UL — SIGNIFICANT CHANGE UP (ref 150–400)
POTASSIUM SERPL-MCNC: 4.8 MMOL/L — SIGNIFICANT CHANGE UP (ref 3.5–5.3)
POTASSIUM SERPL-SCNC: 4.8 MMOL/L — SIGNIFICANT CHANGE UP (ref 3.5–5.3)
PROT SERPL-MCNC: 6.9 G/DL — SIGNIFICANT CHANGE UP (ref 6–8.3)
PROT UR-MCNC: ABNORMAL
RAPID RVP RESULT: SIGNIFICANT CHANGE UP
RBC # BLD: 5.33 M/UL — HIGH (ref 3.8–5.2)
RBC # FLD: 12.4 % — SIGNIFICANT CHANGE UP (ref 10.3–14.5)
RSV RNA SPEC QL NAA+PROBE: SIGNIFICANT CHANGE UP
RV+EV RNA SPEC QL NAA+PROBE: SIGNIFICANT CHANGE UP
SARS-COV-2 RNA SPEC QL NAA+PROBE: SIGNIFICANT CHANGE UP
SODIUM SERPL-SCNC: 136 MMOL/L — SIGNIFICANT CHANGE UP (ref 135–145)
SP GR SPEC: 1.02 — SIGNIFICANT CHANGE UP (ref 1.01–1.05)
T3 SERPL-MCNC: 65 NG/DL — LOW (ref 80–200)
TSH SERPL-MCNC: 1.67 UIU/ML — SIGNIFICANT CHANGE UP (ref 0.27–4.2)
TSH SERPL-MCNC: 2.25 UIU/ML — SIGNIFICANT CHANGE UP (ref 0.27–4.2)
UROBILINOGEN FLD QL: SIGNIFICANT CHANGE UP
WBC # BLD: 6.81 K/UL — SIGNIFICANT CHANGE UP (ref 3.8–10.5)
WBC # FLD AUTO: 6.81 K/UL — SIGNIFICANT CHANGE UP (ref 3.8–10.5)

## 2022-09-21 PROCEDURE — 99223 1ST HOSP IP/OBS HIGH 75: CPT

## 2022-09-21 PROCEDURE — 71045 X-RAY EXAM CHEST 1 VIEW: CPT | Mod: 26

## 2022-09-21 PROCEDURE — 72170 X-RAY EXAM OF PELVIS: CPT | Mod: 26

## 2022-09-21 PROCEDURE — 70450 CT HEAD/BRAIN W/O DYE: CPT | Mod: 26,MA

## 2022-09-21 RX ORDER — PANTOPRAZOLE SODIUM 20 MG/1
40 TABLET, DELAYED RELEASE ORAL
Refills: 0 | Status: DISCONTINUED | OUTPATIENT
Start: 2022-09-21 | End: 2022-10-07

## 2022-09-21 RX ORDER — SOLIFENACIN SUCCINATE 10 MG/1
1 TABLET ORAL
Qty: 0 | Refills: 0 | DISCHARGE

## 2022-09-21 RX ORDER — HEPARIN SODIUM 5000 [USP'U]/ML
5000 INJECTION INTRAVENOUS; SUBCUTANEOUS EVERY 12 HOURS
Refills: 0 | Status: COMPLETED | OUTPATIENT
Start: 2022-09-21 | End: 2022-09-28

## 2022-09-21 RX ORDER — MIDAZOLAM HYDROCHLORIDE 1 MG/ML
1 INJECTION, SOLUTION INTRAMUSCULAR; INTRAVENOUS ONCE
Refills: 0 | Status: DISCONTINUED | OUTPATIENT
Start: 2022-09-21 | End: 2022-09-21

## 2022-09-21 RX ORDER — ACETAMINOPHEN 500 MG
650 TABLET ORAL EVERY 6 HOURS
Refills: 0 | Status: DISCONTINUED | OUTPATIENT
Start: 2022-09-21 | End: 2022-10-07

## 2022-09-21 RX ORDER — ATORVASTATIN CALCIUM 80 MG/1
40 TABLET, FILM COATED ORAL AT BEDTIME
Refills: 0 | Status: DISCONTINUED | OUTPATIENT
Start: 2022-09-21 | End: 2022-10-07

## 2022-09-21 RX ORDER — DOCUSATE SODIUM 100 MG
1 CAPSULE ORAL
Qty: 0 | Refills: 0 | DISCHARGE

## 2022-09-21 RX ORDER — ATORVASTATIN CALCIUM 80 MG/1
1 TABLET, FILM COATED ORAL
Qty: 0 | Refills: 0 | DISCHARGE

## 2022-09-21 RX ORDER — HALOPERIDOL DECANOATE 100 MG/ML
2.5 INJECTION INTRAMUSCULAR ONCE
Refills: 0 | Status: COMPLETED | OUTPATIENT
Start: 2022-09-21 | End: 2022-09-21

## 2022-09-21 RX ORDER — SODIUM CHLORIDE 9 MG/ML
1000 INJECTION, SOLUTION INTRAVENOUS
Refills: 0 | Status: DISCONTINUED | OUTPATIENT
Start: 2022-09-21 | End: 2022-09-22

## 2022-09-21 RX ORDER — HEPARIN SODIUM 5000 [USP'U]/ML
5000 INJECTION INTRAVENOUS; SUBCUTANEOUS EVERY 12 HOURS
Refills: 0 | Status: DISCONTINUED | OUTPATIENT
Start: 2022-09-21 | End: 2022-09-21

## 2022-09-21 RX ORDER — ESOMEPRAZOLE MAGNESIUM 40 MG/1
0 CAPSULE, DELAYED RELEASE ORAL
Qty: 0 | Refills: 0 | DISCHARGE

## 2022-09-21 RX ORDER — SENNA PLUS 8.6 MG/1
2 TABLET ORAL AT BEDTIME
Refills: 0 | Status: DISCONTINUED | OUTPATIENT
Start: 2022-09-21 | End: 2022-10-07

## 2022-09-21 RX ORDER — SODIUM CHLORIDE 9 MG/ML
1000 INJECTION INTRAMUSCULAR; INTRAVENOUS; SUBCUTANEOUS ONCE
Refills: 0 | Status: COMPLETED | OUTPATIENT
Start: 2022-09-21 | End: 2022-09-21

## 2022-09-21 RX ADMIN — Medication 1 MILLIGRAM(S): at 03:21

## 2022-09-21 RX ADMIN — HALOPERIDOL DECANOATE 2.5 MILLIGRAM(S): 100 INJECTION INTRAMUSCULAR at 05:46

## 2022-09-21 RX ADMIN — SODIUM CHLORIDE 100 MILLILITER(S): 9 INJECTION INTRAMUSCULAR; INTRAVENOUS; SUBCUTANEOUS at 14:19

## 2022-09-21 RX ADMIN — HEPARIN SODIUM 5000 UNIT(S): 5000 INJECTION INTRAVENOUS; SUBCUTANEOUS at 23:00

## 2022-09-21 NOTE — ED PROVIDER NOTE - ATTENDING CONTRIBUTION TO CARE
Afebrile. Awake and Alert. Head atraumatic. No grimace to mid-line CS TTP. Lungs CTA. Heart RRR. Abdomen soft NTND. CN II-XII grossly intact. Moves all extremities without lateralization.    r/o ICH, worsening hydrocephalous  r/o sepsis Afebrile. Awake, eye open, not vocalizing or responding to questions. grabbing onto bed railings and not following commands. Head atraumatic. No grimace to mid-line CS TTP. Lungs CTA. Heart RRR. Abdomen soft NTND. CN II-XII grossly intact. Moves all extremities without lateralization.    r/o ICH, worsening hydrocephalous  r/o sepsis

## 2022-09-21 NOTE — H&P ADULT - PROBLEM SELECTOR PLAN 1
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 Pt may have borderline dementia at baseline  S/p ativan/haldol in ER due to agitation, still lethargic from it  - Monitor MS closely  - Avoid sedative agents, if agitation reoccurs will use zyprexa/seroquel instead  - Possibility that an infection is developing but is not obvious yet  - Monitor temp curve closely  - Ketones in urine and hemoconcentration suggestive of dehydration, will give IVF's for now  - Check vitamin B12/Folic acid levels, TSH is normal  - Dysphagia screen once MS improves  - Neurology consult (d/w them, apprec recs), f/up further recs  - Consider Psych consultation if pt continues to be agitated, concerns of underlying depression given overall decreased PO intake

## 2022-09-21 NOTE — H&P ADULT - NSHPLABSRESULTS_GEN_ALL_CORE
.  LABS:                         16.0   6.81  )-----------( 173      ( 21 Sep 2022 01:05 )             45.9         136  |  101  |  18  ----------------------------<  148<H>  4.8   |  21<L>  |  0.40<L>    Ca    9.1      21 Sep 2022 01:05  Mg     2.00         TPro  6.9  /  Alb  4.5  /  TBili  1.2  /  DBili  x   /  AST  27  /  ALT  13  /  AlkPhos  83        Urinalysis Basic - ( 21 Sep 2022 11:23 )    Color: Light Yellow / Appearance: Clear / S.021 / pH: x  Gluc: x / Ketone: Moderate  / Bili: Negative / Urobili: <2 mg/dL   Blood: x / Protein: 30 mg/dL / Nitrite: Negative   Leuk Esterase: Negative / RBC: 8 /HPF / WBC 0 /HPF   Sq Epi: x / Non Sq Epi: 1 /HPF / Bacteria: Negative            RADIOLOGY, EKG & ADDITIONAL TESTS: Reviewed.     CXR: Clear    HCT-  No acute intracranial hemorrhage or mass effect.

## 2022-09-21 NOTE — ED ADULT NURSE REASSESSMENT NOTE - NS ED NURSE REASSESS COMMENT FT1
Pt A&O x 0. Pt noted restless with attempts to jump out of bed and refusing Xray. 1:1 observation in progress. MD made aware. Ativan administered as ordered. Stretcher in lowest position, wheels locked, appropriate side rails in place.

## 2022-09-21 NOTE — H&P ADULT - PROBLEM SELECTOR PLAN 2
Pt takes vesicare for overactive bladder now with urinary retention  No e/o UTI on UA  - Keep Boudreaux in for now  - TOV once pt is more awake

## 2022-09-21 NOTE — ED PROVIDER NOTE - CLINICAL SUMMARY MEDICAL DECISION MAKING FREE TEXT BOX
Melissa PGY1 - 83 yo F with h/o of HLD, arthritis presents from home with altered mental status and an unwitnessed fall.  Concerned about stroke vs infectious delirium of unknown source.  No sick contacts, no travel.  Labs, ekg, cxr, ua/ucx ordered.

## 2022-09-21 NOTE — ED ADULT NURSE NOTE - CHIEF COMPLAINT QUOTE
82 year brought in for  fall this morning, unknown  if pt hit head, pt has been more confuse since yesterday at 7pm, decreased po intake since yesterday, baseline A*Ox3 with periods of confusion. Pt currently alert to self.  Pt not on blood thinners. PMHx: HTN, hydrocephalus. Pt arrive with 22g in left a/c with LF KVO.

## 2022-09-21 NOTE — ED ADULT NURSE NOTE - OBJECTIVE STATEMENT
Pt A&Ox1 to person only, brought into ED today by daughter for unwitnessed fall and worsening confusion. pt daughter states pt is confused at baseline but lately has been trying to get out of bed on her own, and falling more frequently. Pt has 2 health aids at home. Unknown LOC, blood thinner use or head trauma. PMHx arthritis, HDL. Respirations even and unlabored, afebrile rectally, PERRLA intact. Pt denies any chest pain, dyspnea, dizziness, N/V/D. NAD noted. Pt arrived w/ 18g IV to L AC, flushes without difficulty.  labs and covid swab sent per order. NAD noted upon leaving room.

## 2022-09-21 NOTE — H&P ADULT - HISTORY OF PRESENT ILLNESS
82 year old female with PMHx of Arthritis, Hyperlipidemia and NPH (dx May 2022) who's brought in from home for AMS for one day and likely unwitnessed fall. Per son Logan at bedside patient was on her usual state of health (for most part-90% she is AO x3 at baseline) but yesterday there was some report from patient's HHA that she was kind of sleepy and less interactive. This behavior persisted throughout the day and was worse in the evening/night and she tried to get OOB on her own and then was found sitting on the floor so a fall was suspected. The day before yesterday she was outside getting some sun but did not take any water (apparently does not drink that much water at all). Pt has 24h care at home (private hire, 2 HHA for past several weeks). At baseline pt uses a walker at times and somewhat shuffles. Diagnosed with NPH in May 2022 and has seen a Neurologist at [facility] that obtained an MRI in the past. Son denies any recent sick contacts, recent change in meds, fever/chills. No SOB or cough per son. Appetite has been ok in general although pt does not eat much per grandson's report. No urinary symptoms that he knows off or GI issues. Patient has not complaint of anything in particular per son's report. Patient was kind of lethargic (but moving from side to side) during my visit so was unable to answer any of my questions. Patient was retaining urine in the ER so a Boudreaux was inserted.  History also obtained from patient's grandson that is an ACP at [facility] [employer].    VSS in ER  Due to agitation in ER patient received 1mg ativan and 2.5mg haldol

## 2022-09-21 NOTE — ED PROVIDER NOTE - NS ED MD DISPO ISOLATION TYPES
1) Please return to the ED should you have any new or worsening symptoms, worsening pain, develop fever, abdominal distension, or any concerning symptoms  2) Please follow up with your primary care doctor in 2-3 days. You have been provided a report of the CT scan which shows an incidental finding of adnexal mass - please follow up with primary doctor for further evaluation  3) Please take lactulose, 30mg twice a day until bowel movement - has been sent to pharmacy None 1) Please return to the ED should you have any new or worsening symptoms, worsening pain, develop fever, abdominal distension, or any concerning symptoms  2) Please follow up with your primary care doctor in 2-3 days. You have been provided a report of the CT scan which shows an incidental finding of adnexal mass - please follow up with primary doctor for further evaluation  3) Please take lactulose, 30ml twice a day until bowel movement - has been sent to pharmacy

## 2022-09-21 NOTE — CONSULT NOTE ADULT - SUBJECTIVE AND OBJECTIVE BOX
Neurology - Consult Note    -  Spectra: [phone] ([facility]), [phone] ([facility])  -    HPI: Patient ALEKSEY OILVA is a 82y (1940) woman with a PMHx right femoral neck fracture and right hip hemiarthroplasty, HLD, NPH presents with AMS. Son at bedside reports patient has been lethargic for 2 days. HHA informed son patient was was not able to sleep on 9/19 and was sleeping all throughout the day. She was reported to be agitated by the HHA. He reports she has been having progressive decline with recurrent falls over last several months. Denied any seizure like activity, changes to vision, speech, dizziness.  At baseline, patient ambulates with walker and is oriented x2-3. Patient was agitated and climbing out of bed, ativan administered in ED.       Review of Systems:   Unable to obtain     Allergies:  penicillin (Other)      PMHx/PSHx/Family Hx: As above, otherwise see below   Hearing loss    OA (osteoarthritis)    Overactive bladder        Social Hx:  No current use of tobacco, alcohol, or illicit drugs      Medications:  MEDICATIONS  (STANDING):  heparin SubCutaneous Injection - Peds 5000 Unit(s) SubCutaneous every 12 hours  lactated ringers. 1000 milliLiter(s) (100 mL/Hr) IV Continuous <Continuous>    MEDICATIONS  (PRN):  acetaminophen     Tablet .. 650 milliGRAM(s) Oral every 6 hours PRN Temp greater or equal to 38C (100.4F), Mild Pain (1 - 3)      Vitals:  T(C): 36.5 (09-21-22 @ 10:55), Max: 37.1 (09-21-22 @ 01:08)  HR: 63 (09-21-22 @ 16:45) (63 - 74)  BP: 145/60 (09-21-22 @ 16:45) (107/58 - 161/101)  RR: 17 (09-21-22 @ 16:45) (16 - 17)  SpO2: 99% (09-21-22 @ 16:45) (92% - 99%)    Physical Examination:   General - NAD    Neurologic Exam: Limited due to administration of ativan due to agitation   Mental status - Eyes closed, opens with verbal stimuli    Cranial nerves - PERRL, BTT b/l, face without asymmetry b/l    Motor - Decreased bulk and increased tone left arm.  Moving all extremities against gravity   Responds to painful stimuli     DTR's -             Biceps      Triceps     Brachioradialis      Patellar    Ankle    Toes/plantar response  R             2+             2+                  2+               2+            2+                 Down  L              2+             2+                 2+                2+           2+                 Down    Coordination - No tremors appreciated    Gait and station - Deffered    Labs:                        16.0   6.81  )-----------( 173      ( 21 Sep 2022 01:05 )             45.9     09-21    136  |  101  |  18  ----------------------------<  148<H>  4.8   |  21<L>  |  0.40<L>    Ca    9.1      21 Sep 2022 01:05  Mg     2.00     09-21    TPro  6.9  /  Alb  4.5  /  TBili  1.2  /  DBili  x   /  AST  27  /  ALT  13  /  AlkPhos  83  09-21    CAPILLARY BLOOD GLUCOSE      POCT Blood Glucose.: 138 mg/dL (21 Sep 2022 12:22)    LIVER FUNCTIONS - ( 21 Sep 2022 01:05 )  Alb: 4.5 g/dL / Pro: 6.9 g/dL / ALK PHOS: 83 U/L / ALT: 13 U/L / AST: 27 U/L / GGT: x               CSF:                  Radiology:  CT Head No Cont:  (21 Sep 2022 07:00)  < from: CT Head No Cont (09.21.22 @ 07:00) >    IMPRESSION:  No acute intracranial hemorrhage or mass effect.    < end of copied text >     Neurology - Consult Note    -  Spectra: [phone] ([facility]), [phone] ([facility])  -    HPI: Patient ALEKSEY OLIVA is a 82y (1940) woman with a PMHx right femoral neck fracture and right hip hemiarthroplasty, HLD, NPH presents with AMS. Son at bedside reports patient has been lethargic for 2 days. HHA informed son patient was was not able to sleep on 9/19 and was sleeping all throughout the day. She was reported to be agitated by the HHA. He reports she has been having progressive decline with recurrent falls over last several months. Denied any seizure like activity, changes to vision, speech, dizziness. t baseline, patient ambulates with walker and is oriented x2-3. Patient was agitated and climbing out of bed, ativan administered in ED.   Of note patient was seen 2/22 after fall, CT head showed Ventricular dilatation out of proportion to the sulci suggesting communicating hydrocephalus        Review of Systems:   Unable to obtain     Allergies:  penicillin (Other)      PMHx/PSHx/Family Hx: As above, otherwise see below   Hearing loss    OA (osteoarthritis)    Overactive bladder        Social Hx:  No current use of tobacco, alcohol, or illicit drugs      Medications:  MEDICATIONS  (STANDING):  heparin SubCutaneous Injection - Peds 5000 Unit(s) SubCutaneous every 12 hours  lactated ringers. 1000 milliLiter(s) (100 mL/Hr) IV Continuous <Continuous>    MEDICATIONS  (PRN):  acetaminophen     Tablet .. 650 milliGRAM(s) Oral every 6 hours PRN Temp greater or equal to 38C (100.4F), Mild Pain (1 - 3)      Vitals:  T(C): 36.5 (09-21-22 @ 10:55), Max: 37.1 (09-21-22 @ 01:08)  HR: 63 (09-21-22 @ 16:45) (63 - 74)  BP: 145/60 (09-21-22 @ 16:45) (107/58 - 161/101)  RR: 17 (09-21-22 @ 16:45) (16 - 17)  SpO2: 99% (09-21-22 @ 16:45) (92% - 99%)    Physical Examination:   General - NAD    Neurologic Exam: Limited due to administration of ativan due to agitation   Mental status - Eyes closed, opens with verbal stimuli    Cranial nerves - PERRL, BTT b/l, face without asymmetry b/l    Motor - Decreased bulk and increased tone left arm.  Moving all extremities against gravity   Responds to painful stimuli     DTR's -             Biceps      Triceps     Brachioradialis      Patellar    Ankle    Toes/plantar response  R             2+             2+                  2+               2+            2+                 Down  L              2+             2+                 2+                2+           2+                 Down    Coordination - No tremors appreciated    Gait and station - Deffered    Labs:                        16.0   6.81  )-----------( 173      ( 21 Sep 2022 01:05 )             45.9     09-21    136  |  101  |  18  ----------------------------<  148<H>  4.8   |  21<L>  |  0.40<L>    Ca    9.1      21 Sep 2022 01:05  Mg     2.00     09-21    TPro  6.9  /  Alb  4.5  /  TBili  1.2  /  DBili  x   /  AST  27  /  ALT  13  /  AlkPhos  83  09-21    CAPILLARY BLOOD GLUCOSE      POCT Blood Glucose.: 138 mg/dL (21 Sep 2022 12:22)    LIVER FUNCTIONS - ( 21 Sep 2022 01:05 )  Alb: 4.5 g/dL / Pro: 6.9 g/dL / ALK PHOS: 83 U/L / ALT: 13 U/L / AST: 27 U/L / GGT: x               CSF:                  Radiology:  CT Head No Cont:  (21 Sep 2022 07:00)  < from: CT Head No Cont (09.21.22 @ 07:00) >    IMPRESSION:  No acute intracranial hemorrhage or mass effect.    < end of copied text >     Neurology - Consult Note    -  Spectra: [phone] ([facility]), [phone] ([facility])  -    HPI: Patient ALEKSEY OLIVA is a 82y (1940) woman with a PMHx right femoral neck fracture and right hip hemiarthroplasty, HLD, NPH presents with AMS. Son at bedside reports patient has been lethargic for 2 days. HHA informed son that patient was not able to sleep on 9/19 and then was sleeping all throughout the day. She was also noted to be agitated by the HHA. He reports she has been having progressive decline with recurrent falls over last several months after hip fracture. Denied seizure like activity, changes to vision, speech, dizziness. At baseline, patient ambulates with walker and is oriented x2-3. In ED Patient was agitated and climbing out of bed, ativan and haldol was administered.  Of note patient was seen 2/22 after fall, CT head showed Ventricular dilatation out of proportion to the sulci suggesting communicating hydrocephalus        Review of Systems:   Unable to obtain     Allergies:  penicillin (Other)      PMHx/PSHx/Family Hx: As above, otherwise see below   Hearing loss    OA (osteoarthritis)    Overactive bladder        Social Hx:  No current use of tobacco, alcohol, or illicit drugs      Medications:  MEDICATIONS  (STANDING):  heparin SubCutaneous Injection - Peds 5000 Unit(s) SubCutaneous every 12 hours  lactated ringers. 1000 milliLiter(s) (100 mL/Hr) IV Continuous <Continuous>    MEDICATIONS  (PRN):  acetaminophen     Tablet .. 650 milliGRAM(s) Oral every 6 hours PRN Temp greater or equal to 38C (100.4F), Mild Pain (1 - 3)      Vitals:  T(C): 36.5 (09-21-22 @ 10:55), Max: 37.1 (09-21-22 @ 01:08)  HR: 63 (09-21-22 @ 16:45) (63 - 74)  BP: 145/60 (09-21-22 @ 16:45) (107/58 - 161/101)  RR: 17 (09-21-22 @ 16:45) (16 - 17)  SpO2: 99% (09-21-22 @ 16:45) (92% - 99%)    Physical Examination:   General - NAD    Neurologic Exam: Limited due to administration of ativan due to agitation   Mental status - Eyes closed, opens with verbal stimuli    Cranial nerves - PERRL, BTT b/l, face without asymmetry b/l    Motor - Decreased bulk and increased tone left arm.  Moving all extremities against gravity   Responds to painful stimuli     DTR's -             Biceps      Triceps     Brachioradialis      Patellar    Ankle    Toes/plantar response  R             2+             2+                  2+               2+            2+                 Down  L              2+             2+                 2+                2+           2+                 Down    Coordination - No tremors appreciated    Gait and station - Deffered    Labs:                        16.0   6.81  )-----------( 173      ( 21 Sep 2022 01:05 )             45.9     09-21    136  |  101  |  18  ----------------------------<  148<H>  4.8   |  21<L>  |  0.40<L>    Ca    9.1      21 Sep 2022 01:05  Mg     2.00     09-21    TPro  6.9  /  Alb  4.5  /  TBili  1.2  /  DBili  x   /  AST  27  /  ALT  13  /  AlkPhos  83  09-21    CAPILLARY BLOOD GLUCOSE      POCT Blood Glucose.: 138 mg/dL (21 Sep 2022 12:22)    LIVER FUNCTIONS - ( 21 Sep 2022 01:05 )  Alb: 4.5 g/dL / Pro: 6.9 g/dL / ALK PHOS: 83 U/L / ALT: 13 U/L / AST: 27 U/L / GGT: x               CSF:                  Radiology:  CT Head No Cont:  (21 Sep 2022 07:00)  < from: CT Head No Cont (09.21.22 @ 07:00) >    IMPRESSION:  No acute intracranial hemorrhage or mass effect.    < end of copied text >

## 2022-09-21 NOTE — H&P ADULT - PROBLEM SELECTOR PLAN 5
Heparin SQ  PT/OT/Dietitian evaluations      D/w son Logan at bedside at length  D/w Neurology  D/w Greg

## 2022-09-21 NOTE — ED PROVIDER NOTE - PROGRESS NOTE DETAILS
Pt clutching door and resisting being pulled out to CT after Haldol and ativan. Versed ordered. GABRIELLE. TARI Wilkerson: Case endorsed. Admitting for AMS.

## 2022-09-21 NOTE — H&P ADULT - ASSESSMENT
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

## 2022-09-21 NOTE — H&P ADULT - NSHPSOCIALHISTORY_GEN_ALL_CORE
Lives with 24hr care (2 HHA), son and daughter visit patient several times during the week  No smoking, etoh or IVDA  Uses a walker

## 2022-09-21 NOTE — H&P ADULT - NSHPPHYSICALEXAM_GEN_ALL_CORE
Vital Signs Last 24 Hrs  T(C): 36.5 (21 Sep 2022 10:55), Max: 37.1 (21 Sep 2022 01:08)  T(F): 97.7 (21 Sep 2022 10:55), Max: 98.7 (21 Sep 2022 01:08)  HR: 63 (21 Sep 2022 16:45) (63 - 74)  BP: 145/60 (21 Sep 2022 16:45) (107/58 - 161/101)  BP(mean): --  RR: 17 (21 Sep 2022 16:45) (16 - 17)  SpO2: 99% (21 Sep 2022 16:45) (92% - 99%)    Parameters below as of 21 Sep 2022 16:45  Patient On (Oxygen Delivery Method): room air    PHYSICAL EXAM:  GENERAL: NAD, lethargic but moving all 4 extremities  HEAD:  Atraumatic, Normocephalic  EYES: EOMI, PERRLA, conjunctiva and sclera clear  ENMT: No LAD  NECK: Supple, No JVD, Normal thyroid  NERVOUS SYSTEM:  Sleepy, lethargic, unable to assess  CHEST/LUNG: Clear to percussion bilaterally; No rales, rhonchi, wheezing, or rubs  HEART: Regular rate and rhythm; No murmurs, rubs, or gallops  ABDOMEN: Soft, Nontender, Nondistended; Bowel sounds present, +CAMPOS in place (dark urine)  EXTREMITIES:  2+ Peripheral Pulses, No clubbing, cyanosis, or edema  LYMPH: No lymphadenopathy noted  SKIN: No rashes or lesions

## 2022-09-21 NOTE — ED ADULT NURSE NOTE - PRO INTERPRETER NEED 2
English
Take Tylenol or Motrin as needed for pain. You were given a prescription for narcotics. You can take those as needed for the first few days.

## 2022-09-21 NOTE — ED PROVIDER NOTE - PHYSICAL EXAMINATION
GENERAL: non-toxic appearing, confused, mildly agitated  HEAD: normocephalic, atraumatic  HEENT: normal conjunctiva, oral mucosa moist  CARDIAC: regular rate and rhythm, normal S1S2, no appreciable murmurs, 2+ pulses in LE b/l  PULM: normal breath sounds, clear to ascultation bilaterally, no rales, rhonchi, wheezing  GI: abdomen nondistended, soft, nontender, no guarding  : no suprapubic tenderness  NEURO: oriented to person, unable to answer questions appropriately, moving all 4 extremites, PERRLA, EOMI  MSK: no peripheral edema, no calf tenderness b/l  SKIN: well-perfused, extremities warm, no visible rashes

## 2022-09-21 NOTE — ED PROVIDER NOTE - OBJECTIVE STATEMENT
Melissa PGY1 - 83 yo F with h/o of HLD, arthritis presents from home with altered mental status and an unwitnessed fall.  Patient's son reports that she lives at home with the help of 2 home health aides and is normally alert and interactive.  However, over the past day or so patient has become less interactive, and more confused.  Today patient was sleeping, tried to get out of bed on her own and was found on the floor by the home health aide.  Unknown head strike or LOC, though was ambulating without pain before presenting to the emergency department.  Patient unable to answer questions.

## 2022-09-22 DIAGNOSIS — E87.1 HYPO-OSMOLALITY AND HYPONATREMIA: ICD-10-CM

## 2022-09-22 LAB
ANION GAP SERPL CALC-SCNC: 14 MMOL/L — SIGNIFICANT CHANGE UP (ref 7–14)
ANION GAP SERPL CALC-SCNC: 16 MMOL/L — HIGH (ref 7–14)
BASOPHILS # BLD AUTO: 0.02 K/UL — SIGNIFICANT CHANGE UP (ref 0–0.2)
BASOPHILS NFR BLD AUTO: 0.2 % — SIGNIFICANT CHANGE UP (ref 0–2)
BUN SERPL-MCNC: 12 MG/DL — SIGNIFICANT CHANGE UP (ref 7–23)
BUN SERPL-MCNC: 13 MG/DL — SIGNIFICANT CHANGE UP (ref 7–23)
CALCIUM SERPL-MCNC: 8.7 MG/DL — SIGNIFICANT CHANGE UP (ref 8.4–10.5)
CALCIUM SERPL-MCNC: 9.1 MG/DL — SIGNIFICANT CHANGE UP (ref 8.4–10.5)
CHLORIDE SERPL-SCNC: 93 MMOL/L — LOW (ref 98–107)
CHLORIDE SERPL-SCNC: 93 MMOL/L — LOW (ref 98–107)
CHLORIDE UR-SCNC: 148 MMOL/L — SIGNIFICANT CHANGE UP
CO2 SERPL-SCNC: 20 MMOL/L — LOW (ref 22–31)
CO2 SERPL-SCNC: 24 MMOL/L — SIGNIFICANT CHANGE UP (ref 22–31)
CREAT ?TM UR-MCNC: 26 MG/DL — SIGNIFICANT CHANGE UP
CREAT SERPL-MCNC: 0.32 MG/DL — LOW (ref 0.5–1.3)
CREAT SERPL-MCNC: 0.36 MG/DL — LOW (ref 0.5–1.3)
EGFR: 100 ML/MIN/1.73M2 — SIGNIFICANT CHANGE UP
EGFR: 103 ML/MIN/1.73M2 — SIGNIFICANT CHANGE UP
EOSINOPHIL # BLD AUTO: 0 K/UL — SIGNIFICANT CHANGE UP (ref 0–0.5)
EOSINOPHIL NFR BLD AUTO: 0 % — SIGNIFICANT CHANGE UP (ref 0–6)
FOLATE SERPL-MCNC: 18.3 NG/ML — HIGH (ref 3.1–17.5)
GLUCOSE BLDC GLUCOMTR-MCNC: 135 MG/DL — HIGH (ref 70–99)
GLUCOSE BLDC GLUCOMTR-MCNC: 161 MG/DL — HIGH (ref 70–99)
GLUCOSE BLDC GLUCOMTR-MCNC: 171 MG/DL — HIGH (ref 70–99)
GLUCOSE SERPL-MCNC: 162 MG/DL — HIGH (ref 70–99)
GLUCOSE SERPL-MCNC: 176 MG/DL — HIGH (ref 70–99)
HCT VFR BLD CALC: 42.7 % — SIGNIFICANT CHANGE UP (ref 34.5–45)
HCT VFR BLD CALC: 45.3 % — HIGH (ref 34.5–45)
HGB BLD-MCNC: 15.5 G/DL — SIGNIFICANT CHANGE UP (ref 11.5–15.5)
HGB BLD-MCNC: 16.1 G/DL — HIGH (ref 11.5–15.5)
IANC: 9.28 K/UL — HIGH (ref 1.8–7.4)
IMM GRANULOCYTES NFR BLD AUTO: 0.5 % — SIGNIFICANT CHANGE UP (ref 0–0.9)
LYMPHOCYTES # BLD AUTO: 0.94 K/UL — LOW (ref 1–3.3)
LYMPHOCYTES # BLD AUTO: 8.6 % — LOW (ref 13–44)
MAGNESIUM SERPL-MCNC: 1.7 MG/DL — SIGNIFICANT CHANGE UP (ref 1.6–2.6)
MAGNESIUM SERPL-MCNC: 1.8 MG/DL — SIGNIFICANT CHANGE UP (ref 1.6–2.6)
MCHC RBC-ENTMCNC: 29.8 PG — SIGNIFICANT CHANGE UP (ref 27–34)
MCHC RBC-ENTMCNC: 30 PG — SIGNIFICANT CHANGE UP (ref 27–34)
MCHC RBC-ENTMCNC: 35.5 GM/DL — SIGNIFICANT CHANGE UP (ref 32–36)
MCHC RBC-ENTMCNC: 36.3 GM/DL — HIGH (ref 32–36)
MCV RBC AUTO: 82 FL — SIGNIFICANT CHANGE UP (ref 80–100)
MCV RBC AUTO: 84.4 FL — SIGNIFICANT CHANGE UP (ref 80–100)
MONOCYTES # BLD AUTO: 0.68 K/UL — SIGNIFICANT CHANGE UP (ref 0–0.9)
MONOCYTES NFR BLD AUTO: 6.2 % — SIGNIFICANT CHANGE UP (ref 2–14)
NEUTROPHILS # BLD AUTO: 9.28 K/UL — HIGH (ref 1.8–7.4)
NEUTROPHILS NFR BLD AUTO: 84.5 % — HIGH (ref 43–77)
NRBC # BLD: 0 /100 WBCS — SIGNIFICANT CHANGE UP (ref 0–0)
NRBC # BLD: 0 /100 WBCS — SIGNIFICANT CHANGE UP (ref 0–0)
NRBC # FLD: 0 K/UL — SIGNIFICANT CHANGE UP (ref 0–0)
NRBC # FLD: 0 K/UL — SIGNIFICANT CHANGE UP (ref 0–0)
NT-PROBNP SERPL-SCNC: 1242 PG/ML — HIGH
OSMOLALITY UR: 566 MOSM/KG — SIGNIFICANT CHANGE UP (ref 50–1200)
PHOSPHATE SERPL-MCNC: 1.8 MG/DL — LOW (ref 2.5–4.5)
PHOSPHATE SERPL-MCNC: 2.1 MG/DL — LOW (ref 2.5–4.5)
PLATELET # BLD AUTO: 233 K/UL — SIGNIFICANT CHANGE UP (ref 150–400)
PLATELET # BLD AUTO: 236 K/UL — SIGNIFICANT CHANGE UP (ref 150–400)
POTASSIUM SERPL-MCNC: 3.2 MMOL/L — LOW (ref 3.5–5.3)
POTASSIUM SERPL-MCNC: 3.4 MMOL/L — LOW (ref 3.5–5.3)
POTASSIUM SERPL-SCNC: 3.2 MMOL/L — LOW (ref 3.5–5.3)
POTASSIUM SERPL-SCNC: 3.4 MMOL/L — LOW (ref 3.5–5.3)
RBC # BLD: 5.21 M/UL — HIGH (ref 3.8–5.2)
RBC # BLD: 5.37 M/UL — HIGH (ref 3.8–5.2)
RBC # FLD: 12.4 % — SIGNIFICANT CHANGE UP (ref 10.3–14.5)
RBC # FLD: 12.6 % — SIGNIFICANT CHANGE UP (ref 10.3–14.5)
SODIUM SERPL-SCNC: 129 MMOL/L — LOW (ref 135–145)
SODIUM SERPL-SCNC: 131 MMOL/L — LOW (ref 135–145)
SODIUM UR-SCNC: 163 MMOL/L — SIGNIFICANT CHANGE UP
SODIUM UR-SCNC: 165 MMOL/L — SIGNIFICANT CHANGE UP
T PALLIDUM AB TITR SER: NEGATIVE — SIGNIFICANT CHANGE UP
TROPONIN T, HIGH SENSITIVITY RESULT: 17 NG/L — SIGNIFICANT CHANGE UP
TROPONIN T, HIGH SENSITIVITY RESULT: 24 NG/L — SIGNIFICANT CHANGE UP
VIT B12 SERPL-MCNC: 566 PG/ML — SIGNIFICANT CHANGE UP (ref 200–900)
WBC # BLD: 10.97 K/UL — HIGH (ref 3.8–10.5)
WBC # BLD: 8.48 K/UL — SIGNIFICANT CHANGE UP (ref 3.8–10.5)
WBC # FLD AUTO: 10.97 K/UL — HIGH (ref 3.8–10.5)
WBC # FLD AUTO: 8.48 K/UL — SIGNIFICANT CHANGE UP (ref 3.8–10.5)

## 2022-09-22 PROCEDURE — 99223 1ST HOSP IP/OBS HIGH 75: CPT | Mod: GC

## 2022-09-22 PROCEDURE — 71045 X-RAY EXAM CHEST 1 VIEW: CPT | Mod: 26

## 2022-09-22 PROCEDURE — 99497 ADVNCD CARE PLAN 30 MIN: CPT

## 2022-09-22 PROCEDURE — 93010 ELECTROCARDIOGRAM REPORT: CPT

## 2022-09-22 PROCEDURE — 99233 SBSQ HOSP IP/OBS HIGH 50: CPT

## 2022-09-22 RX ORDER — POTASSIUM CHLORIDE 20 MEQ
10 PACKET (EA) ORAL
Refills: 0 | Status: COMPLETED | OUTPATIENT
Start: 2022-09-22 | End: 2022-09-22

## 2022-09-22 RX ORDER — POTASSIUM PHOSPHATE, MONOBASIC POTASSIUM PHOSPHATE, DIBASIC 236; 224 MG/ML; MG/ML
15 INJECTION, SOLUTION INTRAVENOUS ONCE
Refills: 0 | Status: COMPLETED | OUTPATIENT
Start: 2022-09-22 | End: 2022-09-22

## 2022-09-22 RX ORDER — POTASSIUM PHOSPHATE, MONOBASIC POTASSIUM PHOSPHATE, DIBASIC 236; 224 MG/ML; MG/ML
15 INJECTION, SOLUTION INTRAVENOUS ONCE
Refills: 0 | Status: DISCONTINUED | OUTPATIENT
Start: 2022-09-22 | End: 2022-09-22

## 2022-09-22 RX ADMIN — Medication 100 MILLIEQUIVALENT(S): at 21:33

## 2022-09-22 RX ADMIN — Medication 100 MILLIEQUIVALENT(S): at 18:00

## 2022-09-22 RX ADMIN — POTASSIUM PHOSPHATE, MONOBASIC POTASSIUM PHOSPHATE, DIBASIC 62.5 MILLIMOLE(S): 236; 224 INJECTION, SOLUTION INTRAVENOUS at 16:15

## 2022-09-22 RX ADMIN — HEPARIN SODIUM 5000 UNIT(S): 5000 INJECTION INTRAVENOUS; SUBCUTANEOUS at 18:03

## 2022-09-22 RX ADMIN — Medication 100 MILLIEQUIVALENT(S): at 19:39

## 2022-09-22 RX ADMIN — HEPARIN SODIUM 5000 UNIT(S): 5000 INJECTION INTRAVENOUS; SUBCUTANEOUS at 08:37

## 2022-09-22 NOTE — PROGRESS NOTE ADULT - SUBJECTIVE AND OBJECTIVE BOX
INTERVAL HPI/OVERNIGHT EVENTS:  Patient was seen and examined. Unable to assess complaints due to mental status. Per grandson bedside this is not patient's baseline. Confirmed with patient's son and daughter as well. Patient normally knows who she is, where she is, and knows the month and year. Sometimes she gets the day of the week wrong. Sometimes she uses the wrong names for family members. Participates in ADLs however has gait abnormalities which led to fall prior to admission. ROS otherwise negative.     VITAL SIGNS:  T(F): 98 (22 @ 11:22)  HR: 62 (22 @ 11:22)  BP: 168/72 (22 @ 11:22)  RR: 18 (22 @ :22)  SpO2: 99% (22 @ :22)  Wt(kg): --    PHYSICAL EXAM:  Constitutional: Lethargic, arouses to tactile stimuli   HEENT: sclera non-icteric, neck supple, no masses, + JVD, + dry lips   Respiratory: poor respiratory effort due to inability of following commands, trace crackles left lung base   Cardiovascular: RRR, normal S1S2, no M/R/G  Gastrointestinal: soft, NTND, no masses palpable, BS normal  Extremities: Warm, well perfused, pulses equal bilateral upper and lower extremities, no edema   Neurological: awakens, not alert, flaccid all extremities, not rigid, no t following commands   Skin: Normal temperature, warm, + dry skin     MEDICATIONS  (STANDING):  atorvastatin 40 milliGRAM(s) Oral at bedtime  heparin   Injectable 5000 Unit(s) SubCutaneous every 12 hours  pantoprazole    Tablet 40 milliGRAM(s) Oral before breakfast  potassium phosphate IVPB 15 milliMole(s) IV Intermittent once  senna 2 Tablet(s) Oral at bedtime    MEDICATIONS  (PRN):  acetaminophen     Tablet .. 650 milliGRAM(s) Oral every 6 hours PRN Temp greater or equal to 38C (100.4F), Mild Pain (1 - 3)      Allergies    penicillin (Other)    Intolerances        LABS:                        15.5   8.48  )-----------( 233      ( 22 Sep 2022 14:17 )             42.7         131<L>  |  93<L>  |  13  ----------------------------<  162<H>  3.2<L>   |  24  |  0.36<L>    Ca    8.7      22 Sep 2022 14:17  Phos  2.1       Mg     1.70         TPro  6.9  /  Alb  4.5  /  TBili  1.2  /  DBili  x   /  AST  27  /  ALT  13  /  AlkPhos  83        Urinalysis Basic - ( 21 Sep 2022 11:23 )    Color: Light Yellow / Appearance: Clear / S.021 / pH: x  Gluc: x / Ketone: Moderate  / Bili: Negative / Urobili: <2 mg/dL   Blood: x / Protein: 30 mg/dL / Nitrite: Negative   Leuk Esterase: Negative / RBC: 8 /HPF / WBC 0 /HPF   Sq Epi: x / Non Sq Epi: 1 /HPF / Bacteria: Negative    CT head:   IMPRESSION:  No acute intracranial hemorrhage or mass effect.    X-ray Pelvis:   No acute displaced fracture.  Incompletely evaluated right hip arthroplasty hip arthroplasty. Moderate   multilevel spondylosis of the lumbar spine.  IMPRESSION:  No acute displaced fracture.    X-ray Chest--> clear lungs     Case Discussed Neurology --> if mental status does not improve  will potentially offer LP. No need for MRI or EEG at this time.     RADIOLOGY & ADDITIONAL TESTS:  Reviewed

## 2022-09-22 NOTE — DIETITIAN INITIAL EVALUATION ADULT - ORAL NUTRITION SUPPLEMENTS
Ensure Enlive 3x daily (1050 wallace and 60 gm protein)  Ensure Enlive 3x daily (1050 wallace and 60 gm protein) when PO diet initiated.

## 2022-09-22 NOTE — PHYSICAL THERAPY INITIAL EVALUATION ADULT - PATIENT/FAMILY/SIGNIFICANT OTHER GOALS STATEMENT, PT EVAL
Labs tested:  RPR, HIV, lipid, cpnl    Called pt left detailed message conveying normal lab results    ----- Message from Mike Rosas MD sent at 8/11/2018 10:16 AM CDT -----  nl     None stated

## 2022-09-22 NOTE — BH CONSULTATION LIAISON ASSESSMENT NOTE - NSBHATTESTTYPEVISIT_PSY_A_CORE
BRANDEN without on-site Attending supervision Attending evaluating patient with BRANDEN ([id number] code)

## 2022-09-22 NOTE — PHYSICAL THERAPY INITIAL EVALUATION ADULT - PREDICTED DURATION OF THERAPY (DAYS/WKS), PT EVAL
Pt will be placed on trial for 1-2 more sessions to determine if appropriate for skilled PT intervention.

## 2022-09-22 NOTE — PHYSICAL THERAPY INITIAL EVALUATION ADULT - PATIENT PROFILE REVIEW, REHAB EVAL
PT evaluate and treat orders received: ambulate as tolerated. Consult with EVIE Palmer, pt may participate in PT evaluation./yes

## 2022-09-22 NOTE — DIETITIAN INITIAL EVALUATION ADULT - PHYSCIAL ASSESSMENT
Unable to obtain consent from pt. However, patient exhibits moderate to severe fat loss and muscle wasting./debilitated/contracted

## 2022-09-22 NOTE — BH CONSULTATION LIAISON ASSESSMENT NOTE - RISK ASSESSMENT
Unable to fully assess in the setting of lethargy     no psych history, new ams, no safety concerns at home

## 2022-09-22 NOTE — DIETITIAN INITIAL EVALUATION ADULT - OTHER INFO
Per chart review, Pt. is an 82 y.o. 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     Patient seen at bedside. AxOx1. Not arouse to voice. Information obtained via daughter(Araceli) who reports patient has been a "small eater", however patient snacks twice daily with some grapes and crackers. Patient follows Kosher diet at home. Per daughter, patient has been lethargic for 2 days w/minimal intake.  Patient currently on NPO due to concern of alertness, on IVF for hydration. Defer PO diet to MD discretion. Labs notable with Na 129L, low potassium, low phos, currently on potassium phosphate for replenish.

## 2022-09-22 NOTE — BH CONSULTATION LIAISON ASSESSMENT NOTE - CURRENT MEDICATION
MEDICATIONS  (STANDING):  atorvastatin 40 milliGRAM(s) Oral at bedtime  heparin   Injectable 5000 Unit(s) SubCutaneous every 12 hours  pantoprazole    Tablet 40 milliGRAM(s) Oral before breakfast  potassium phosphate IVPB 15 milliMole(s) IV Intermittent once  senna 2 Tablet(s) Oral at bedtime    MEDICATIONS  (PRN):  acetaminophen     Tablet .. 650 milliGRAM(s) Oral every 6 hours PRN Temp greater or equal to 38C (100.4F), Mild Pain (1 - 3)

## 2022-09-22 NOTE — OCCUPATIONAL THERAPY INITIAL EVALUATION ADULT - GENERAL OBSERVATIONS, REHAB EVAL
Patient received semisupine in bed in NAD. +IV. +Boudreaux. 1:1 at bedside. Lethargic and unable to follow commands.

## 2022-09-22 NOTE — PHYSICAL THERAPY INITIAL EVALUATION ADULT - GENERAL OBSERVATIONS, REHAB EVAL
Pt received semisupine in bed, +IV, +holman, in NAD. PCA at bedside. Pt left semisupine in bed as found, all lines intact and RN aware.

## 2022-09-22 NOTE — BH CONSULTATION LIAISON ASSESSMENT NOTE - NSBHCHARTREVIEWVS_PSY_A_CORE FT
Vital Signs Last 24 Hrs  T(C): 36.7 (22 Sep 2022 11:22), Max: 36.7 (21 Sep 2022 21:46)  T(F): 98 (22 Sep 2022 11:22), Max: 98.1 (21 Sep 2022 21:46)  HR: 62 (22 Sep 2022 11:22) (59 - 96)  BP: 168/72 (22 Sep 2022 11:22) (145/60 - 180/74)  BP(mean): --  RR: 18 (22 Sep 2022 11:22) (17 - 18)  SpO2: 99% (22 Sep 2022 11:22) (97% - 99%)    Parameters below as of 22 Sep 2022 11:22  Patient On (Oxygen Delivery Method): room air

## 2022-09-22 NOTE — DIETITIAN INITIAL EVALUATION ADULT - PERTINENT MEDS FT
MEDICATIONS  (STANDING):  atorvastatin 40 milliGRAM(s) Oral at bedtime  heparin   Injectable 5000 Unit(s) SubCutaneous every 12 hours  pantoprazole    Tablet 40 milliGRAM(s) Oral before breakfast  potassium chloride  10 mEq/100 mL IVPB 10 milliEquivalent(s) IV Intermittent every 1 hour  senna 2 Tablet(s) Oral at bedtime    MEDICATIONS  (PRN):  acetaminophen     Tablet .. 650 milliGRAM(s) Oral every 6 hours PRN Temp greater or equal to 38C (100.4F), Mild Pain (1 - 3)

## 2022-09-22 NOTE — OCCUPATIONAL THERAPY INITIAL EVALUATION ADULT - NSOTDISCHREC_GEN_A_CORE
Patient will be placed on trial of OT program. Patient lethargic and unable to follow commands during evaluation. OT to continue to follow.

## 2022-09-22 NOTE — DIETITIAN INITIAL EVALUATION ADULT - PERTINENT LABORATORY DATA
09-22    131<L>  |  93<L>  |  13  ----------------------------<  162<H>  3.2<L>   |  24  |  0.36<L>    Ca    8.7      22 Sep 2022 14:17  Phos  2.1     09-22  Mg     1.70     09-22    TPro  6.9  /  Alb  4.5  /  TBili  1.2  /  DBili  x   /  AST  27  /  ALT  13  /  AlkPhos  83  09-21  POCT Blood Glucose.: 161 mg/dL (09-22-22 @ 15:04)  A1C with Estimated Average Glucose Result: 5.6 % (09-21-22 @ 01:05)

## 2022-09-22 NOTE — CONSULT NOTE ADULT - ATTENDING COMMENTS
82y (1940) woman with a PMHx right femoral neck fracture and right hip hemiarthroplasty, HLD, NPH presents with AMS. Pe as above AA) x1 follow some commands elevated WBC  CTH to my eyes consistent with NPH demonstrating DESH    Impression AMS in setting of likely NPH, r/o underlying infection    F/U with Dr. Burton for further manangement  If infectious w/u negative and symptoms do not improve , will reassess role for LP with physical therapy assessing gait before and after
CELINA  Obstructive uropathy    Repeat labs stat. Start on IVF likely NS @ 100cc/hr, monitor Is/Os and daily weights.  Will tailor fluids to labs once resulted.

## 2022-09-22 NOTE — PHYSICAL THERAPY INITIAL EVALUATION ADULT - FOLLOWS COMMANDS/ANSWERS QUESTIONS, REHAB EVAL
Pt mostly non-verbal during session, except stated "2:30" on few occasions/unable to follow commands/unable to answer questions

## 2022-09-22 NOTE — BH CONSULTATION LIAISON ASSESSMENT NOTE - NSBHCHARTREVIEWLAB_PSY_A_CORE FT
15.5   8.48  )-----------( 233      ( 22 Sep 2022 14:17 )             42.7   09-22    131<L>  |  93<L>  |  13  ----------------------------<  162<H>  3.2<L>   |  24  |  0.36<L>    Ca    8.7      22 Sep 2022 14:17  Phos  2.1     09-22  Mg     1.70     09-22    TPro  6.9  /  Alb  4.5  /  TBili  1.2  /  DBili  x   /  AST  27  /  ALT  13  /  AlkPhos  83  09-21

## 2022-09-22 NOTE — OCCUPATIONAL THERAPY INITIAL EVALUATION ADULT - ADDITIONAL COMMENTS
Patient unable to provide social history. Per chart review, patient has 24/7 home health aide services and uses a rolling walker for functional mobility. Further prior level of function unclear at this time.

## 2022-09-22 NOTE — BH CONSULTATION LIAISON ASSESSMENT NOTE - MSE UNSTRUCTURED FT
Unable to fully assess in the setting of lethargy   Able to respond to loud verbal and physical stimuli - Knows name and states she is in her room for location, as well as, able to state "Lilyrandall" as president

## 2022-09-22 NOTE — BH CONSULTATION LIAISON ASSESSMENT NOTE - SUMMARY
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 In ED received Haldol 2.5mg and Ativan 1mg for agitation before CT head performed was also noted to be retaining urine, so a Boudreaux was inserted. Psychiatry consulted for agitation/AMS.    Upon assessment patient is able to respond to loud verbal and physical stimuli - Knows name and states she is in her room for location, as well as, able to state "Biden" as president. Otherwise, patient goes back to sleep/lethargic state and unable to participate further. Family at bedside provided information as above and confirms this is not patient's baseline. Patient had episode of lethargy at home associated with episode of vomiting and since has not eaten. Denies any symptoms of suicidality or psychosis. Confirms h/o cognitive impairment, but still able to carry out most ADLs and has HHA for additional help.    CXR clear, RVP (-), UA (-), blood cultures pending, CT head small vessel disease w/ no acute findings, B12/Folate/TSH WNL, RPR (-), ammonia WNL, electrolyte abnormalities present in setting of decreased PO intake, no leukocytosis/fever   Worsened lethargy since receiving Haldol/Ativan in ED when patient more agitated/restless.     PLAN:  - Observational status deferred to primary team   - Continue ongoing infectious, metabolic, neurological work-up  - Would hold off on any medications in the setting of lethargy; If severely agitated can give Haldol 0.5mg PO/IV/IM q 6 hours PRN   - In order to enhance patient's overall well-being and clinical course, please try avoiding benzodiazepines, anticholinergics, and antihistamines (Can cause worsening confusion/delirium). Additionally, continue reorientation, supportive care, maintaining regular sleep/wake cycle, and optimizing nutritional/medical factors.   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 In ED received Haldol 2.5mg and Ativan 1mg for agitation before CT head performed was also noted to be retaining urine, so a Boudreaux was inserted. Psychiatry consulted for agitation/AMS.    Upon assessment patient is able to respond to loud verbal and physical stimuli - Knows name and states she is in her room for location, as well as, able to state "Biden" as president. Otherwise, patient goes back to sleep/lethargic state and unable to participate further. Family at bedside provided information as above and confirms this is not patient's baseline. Patient had episode of lethargy at home associated with episode of vomiting and since has not eaten. Denies any symptoms of suicidality or psychosis. Confirms h/o cognitive impairment, but still able to carry out most ADLs and has HHA for additional help.    CXR clear, RVP (-), UA (-), blood cultures pending, CT head small vessel disease w/ no acute findings, B12/Folate/TSH WNL, RPR (-), ammonia WNL, electrolyte abnormalities present in setting of decreased PO intake, no leukocytosis/fever   Worsened lethargy since receiving Haldol/Ativan in ED when patient more agitated/restless.     PLAN:  - Observational status deferred to primary team   - Continue ongoing infectious, metabolic, neurological work-up  - Would hold off on any medications in the setting of lethargy; If severely agitated can give Haldol 0.5mg PO/IV/IM q 6 hours PRN. ensure qtc<500  - In order to enhance patient's overall well-being and clinical course, please try avoiding benzodiazepines, anticholinergics, and antihistamines (Can cause worsening confusion/delirium). Additionally, continue reorientation, supportive care, maintaining regular sleep/wake cycle, and optimizing nutritional/medical factors.

## 2022-09-22 NOTE — PHYSICAL THERAPY INITIAL EVALUATION ADULT - PERTINENT HX OF CURRENT PROBLEM, REHAB EVAL
Pt is an 82 year old female presenting with AMS and likely unwitnessed fall. Pt tried to get out of bed by self and was found sitting on floor. Admitted for AMS/encephalopathy of unclear etiology. s/p haldol/ativan in ED.

## 2022-09-22 NOTE — OCCUPATIONAL THERAPY INITIAL EVALUATION ADULT - PERTINENT HX OF CURRENT PROBLEM, REHAB EVAL
82 year old female with history of Arthritis, Hyperlipidemia and NPH (dx May 2022) who's brought in from home for AMS and likely unwitnessed fall. Boudreaux placed in ED for urine retention and received Ativan/Haldol for agitation. CT brain negative for acute changes.

## 2022-09-22 NOTE — PROGRESS NOTE ADULT - CONVERSATION DETAILS
Updated patient son and daughter. Explained that patient is confused from her normal and that we are trying to figure out why. So far we have not identified and infectious source. Other causes for an acute change include hemorrhagic stroke, however CT head negative. CT head also w/o ischemic stroke. Concern that this is related to NPH. Informed neurology is consulted and that they will guide further testing. Patient also hyponatremic for unclear reasons and that nephrology is also helping to guide treatment. Informed them that patient will be moving to another floor for closer monitoring. Discussed GOC. Patient w/ living will. Patient DNR/DNI. MOLST for completed. Patient would not want a feeding tube, this also updated in MOLST. Patient is not full comfort measures, would want abx if indicated or IVF. Updated patient son and daughter. Explained that patient is confused from her normal and that we are trying to figure out why. So far we have not identified and infectious source. Other causes for an acute change include hemorrhagic stroke, however CT head negative. CT head also w/o ischemic stroke. Concern that this is related to NPH. Informed neurology is consulted and that they will guide further testing. Patient also hyponatremic for unclear reasons and that nephrology is also helping to guide treatment. Informed them that patient will be moving to another floor for closer monitoring. Discussed GOC. Patient w/ living will. Patient DNR/DNI. MOLST form completed. Patient would not want a feeding tube, this also updated in MOLST. Patient is not full comfort measures, would want abx if indicated or IVF.

## 2022-09-22 NOTE — DIETITIAN INITIAL EVALUATION ADULT - ADD RECOMMEND
1) Initiate PO diet (Kosher) when medically feasible. Defer PO diet to MD discretion.   2) Recommend consider alternative nutrition therapy if within GOC.   3) Monitor BMs, Labs, weights, and skin integrity.   4) Nutrition education is not appropriated, given patient AxOx1. RD to remain available for further nutritional interventions as indicated.

## 2022-09-22 NOTE — DIETITIAN INITIAL EVALUATION ADULT - NS FNS WEIGHT CHANGE REASON
Daughter reported UBW: 120# 1 mo ago, and most recent wt via bedscale 9/22-101lbs. Wt reflects wt loss of significant wt loss of 7lbs/5.8% x 1 mo./unintentional

## 2022-09-22 NOTE — CONSULT NOTE ADULT - SUBJECTIVE AND OBJECTIVE BOX
[facility], [place] DIVISION OF KIDNEY DISEASES AND HYPERTENSION -- [phone]  -- INITIAL CONSULT NOTE  --------------------------------------------------------------------------------  HPI:  Pt. is an 82 y.o. 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 Nephrology consulted for Hyponatremia. Pt. admitted with SNa of 136 which has trended to 129 today after 1L NS and maintenance LR @ 100. History provided y son at bedside and chart review as Pt. unable to participate meaningfully in exam. Per son Pt. has been having decreased Po intake over the last 2 days. Pt. at baseline ambulates with a walker but now is bed-bound over the last couple of days. In the ER a Boudreaux was placed and had "urine pouring out." Unable to obtain ROS. UOsm 566 and Eliseo 163.       PAST HISTORY  --------------------------------------------------------------------------------  PAST MEDICAL & SURGICAL HISTORY:  Hearing loss  bilateral -- wears aids      OA (osteoarthritis)  first carpometacarpal joint of right hand      Overactive bladder      Fibroids  1990  hysterectomy        FAMILY HISTORY:  No pertinent family history in first degree relatives      PAST SOCIAL HISTORY:    ALLERGIES & MEDICATIONS  --------------------------------------------------------------------------------  Allergies    penicillin (Other)    Intolerances      Standing Inpatient Medications  atorvastatin 40 milliGRAM(s) Oral at bedtime  heparin   Injectable 5000 Unit(s) SubCutaneous every 12 hours  pantoprazole    Tablet 40 milliGRAM(s) Oral before breakfast  senna 2 Tablet(s) Oral at bedtime    PRN Inpatient Medications  acetaminophen     Tablet .. 650 milliGRAM(s) Oral every 6 hours PRN      REVIEW OF SYSTEMS  --------------------------------------------------------------------------------  Unable to obtain    VITALS/PHYSICAL EXAM  --------------------------------------------------------------------------------  T(C): 36.7 (09-22-22 @ 11:22), Max: 36.7 (09-21-22 @ 21:46)  HR: 62 (09-22-22 @ 11:22) (59 - 96)  BP: 168/72 (09-22-22 @ 11:22) (145/60 - 180/74)  RR: 18 (09-22-22 @ 11:22) (17 - 18)  SpO2: 99% (09-22-22 @ 11:22) (97% - 99%)  Wt(kg): --  Height (cm): 152.4 (09-20-22 @ 22:51)  Weight (kg): 45.9 (09-22-22 @ 13:04)  BMI (kg/m2): 19.8 (09-22-22 @ 13:04)  BSA (m2): 1.4 (09-22-22 @ 13:04)      09-21-22 @ 07:01  -  09-22-22 @ 07:00  --------------------------------------------------------  IN: 0 mL / OUT: 800 mL / NET: -800 mL    09-22-22 @ 07:01  -  09-22-22 @ 14:24  --------------------------------------------------------  IN: 0 mL / OUT: 1350 mL / NET: -1350 mL      Physical Exam:  	Gen: ill-appearing   	HEENT: Dry MM  	Pulm: CTA B/L  	CV: S1S2  	Abd: Soft, +BS   	Ext: No LE edema B/L  	Neuro: Somnolent/Obtunded  	Skin: Warm and dry  	Vascular access: peripheral     LABS/STUDIES  --------------------------------------------------------------------------------              16.1   10.97 >-----------<  236      [09-22-22 @ 06:09]              45.3     129  |  93  |  12  ----------------------------<  176      [09-22-22 @ 06:09]  3.4   |  20  |  0.32        Ca     9.1     [09-22-22 @ 06:09]      Mg     1.80     [09-22-22 @ 06:09]      Phos  1.8     [09-22-22 @ 06:09]    TPro  6.9  /  Alb  4.5  /  TBili  1.2  /  DBili  x   /  AST  27  /  ALT  13  /  AlkPhos  83  [09-21-22 @ 01:05]              [09-21-22 @ 17:33]    Creatinine Trend:  SCr 0.32 [09-22 @ 06:09]  SCr 0.40 [09-21 @ 01:05]    Urinalysis - [09-21-22 @ 11:23]      Color Light Yellow / Appearance Clear / SG 1.021 / pH 7.0      Gluc 200 mg/dL / Ketone Moderate  / Bili Negative / Urobili <2 mg/dL       Blood Negative / Protein 30 mg/dL / Leuk Est Negative / Nitrite Negative      RBC 8 / WBC 0 / Hyaline  / Gran  / Sq Epi  / Non Sq Epi 1 / Bacteria Negative    Urine Creatinine 26      [09-22-22 @ 13:11]  Urine Sodium 163      [09-22-22 @ 13:11]  Urine Chloride 148      [09-22-22 @ 13:11]  Urine Osmolality 566      [09-22-22 @ 12:03]    TSH 1.67      [09-21-22 @ 17:33]      Syphilis Screen (Treponema Pallidum Ab) Negative      [09-21-22 @ 17:33]     [facility] DIVISION OF KIDNEY DISEASES AND HYPERTENSION -- [phone]  -- INITIAL CONSULT NOTE  --------------------------------------------------------------------------------  HPI:  Pt. is an 82 y.o. 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 Nephrology consulted for Hyponatremia. Pt. admitted with SNa of 136 which has trended to 129 today after 1L NS and maintenance LR @ 100. History provided y son at bedside and chart review as Pt. unable to participate meaningfully in exam. Per son Pt. has been having decreased Po intake over the last 2 days. Pt. at baseline ambulates with a walker but now is bed-bound over the last couple of days. In the ER a Boudreaux was placed and had "urine pouring out." Unable to obtain ROS. UOsm 566 and Eliseo 163.       PAST HISTORY  --------------------------------------------------------------------------------  PAST MEDICAL & SURGICAL HISTORY:  Hearing loss  bilateral -- wears aids      OA (osteoarthritis)  first carpometacarpal joint of right hand      Overactive bladder      Fibroids  1990  hysterectomy        FAMILY HISTORY:  No pertinent family history in first degree relatives      PAST SOCIAL HISTORY: No smoking, drugs or alcohol use. Uses a rolling walker.     ALLERGIES & MEDICATIONS  --------------------------------------------------------------------------------  Allergies    penicillin (Other)    Intolerances      Standing Inpatient Medications  atorvastatin 40 milliGRAM(s) Oral at bedtime  heparin   Injectable 5000 Unit(s) SubCutaneous every 12 hours  pantoprazole    Tablet 40 milliGRAM(s) Oral before breakfast  senna 2 Tablet(s) Oral at bedtime    PRN Inpatient Medications  acetaminophen     Tablet .. 650 milliGRAM(s) Oral every 6 hours PRN      REVIEW OF SYSTEMS  --------------------------------------------------------------------------------  Unable to obtain    VITALS/PHYSICAL EXAM  --------------------------------------------------------------------------------  T(C): 36.7 (09-22-22 @ 11:22), Max: 36.7 (09-21-22 @ 21:46)  HR: 62 (09-22-22 @ 11:22) (59 - 96)  BP: 168/72 (09-22-22 @ 11:22) (145/60 - 180/74)  RR: 18 (09-22-22 @ 11:22) (17 - 18)  SpO2: 99% (09-22-22 @ 11:22) (97% - 99%)  Wt(kg): --  Height (cm): 152.4 (09-20-22 @ 22:51)  Weight (kg): 45.9 (09-22-22 @ 13:04)  BMI (kg/m2): 19.8 (09-22-22 @ 13:04)  BSA (m2): 1.4 (09-22-22 @ 13:04)      09-21-22 @ 07:01  -  09-22-22 @ 07:00  --------------------------------------------------------  IN: 0 mL / OUT: 800 mL / NET: -800 mL    09-22-22 @ 07:01  -  09-22-22 @ 14:24  --------------------------------------------------------  IN: 0 mL / OUT: 1350 mL / NET: -1350 mL      Physical Exam:  	Gen: ill-appearing   	HEENT: Dry MM  	Pulm: CTA B/L  	CV: S1S2  	Abd: Soft, +BS   	Ext: No LE edema B/L  	Neuro: Somnolent/Obtunded  	Skin: Warm and dry  	Vascular access: peripheral     LABS/STUDIES  --------------------------------------------------------------------------------              16.1   10.97 >-----------<  236      [09-22-22 @ 06:09]              45.3     129  |  93  |  12  ----------------------------<  176      [09-22-22 @ 06:09]  3.4   |  20  |  0.32        Ca     9.1     [09-22-22 @ 06:09]      Mg     1.80     [09-22-22 @ 06:09]      Phos  1.8     [09-22-22 @ 06:09]    TPro  6.9  /  Alb  4.5  /  TBili  1.2  /  DBili  x   /  AST  27  /  ALT  13  /  AlkPhos  83  [09-21-22 @ 01:05]              [09-21-22 @ 17:33]    Creatinine Trend:  SCr 0.32 [09-22 @ 06:09]  SCr 0.40 [09-21 @ 01:05]    Urinalysis - [09-21-22 @ 11:23]      Color Light Yellow / Appearance Clear / SG 1.021 / pH 7.0      Gluc 200 mg/dL / Ketone Moderate  / Bili Negative / Urobili <2 mg/dL       Blood Negative / Protein 30 mg/dL / Leuk Est Negative / Nitrite Negative      RBC 8 / WBC 0 / Hyaline  / Gran  / Sq Epi  / Non Sq Epi 1 / Bacteria Negative    Urine Creatinine 26      [09-22-22 @ 13:11]  Urine Sodium 163      [09-22-22 @ 13:11]  Urine Chloride 148      [09-22-22 @ 13:11]  Urine Osmolality 566      [09-22-22 @ 12:03]    TSH 1.67      [09-21-22 @ 17:33]      Syphilis Screen (Treponema Pallidum Ab) Negative      [09-21-22 @ 17:33]

## 2022-09-22 NOTE — BH CONSULTATION LIAISON ASSESSMENT NOTE - NSBHATTESTCOMMENTATTENDFT_PSY_A_CORE
met with the patient this morning. Case discussed with PA, impression and plan discussed and agreed upon.   Patient was more awake today, recognizing son at bedside. Hard of hearing. Daughter will be bringing hearing aids in. No distress, no agitation. No cogwheeling or tremors on exam.   All of son's questions answered.

## 2022-09-22 NOTE — BH CONSULTATION LIAISON ASSESSMENT NOTE - NSBHATTESTAPPAMEND_PSY_A_CORE
I have personally seen and examined this patient. I fully participated in the care of this patient. I have made amendments to the documentation where appropriate and otherwise agree with the history, physical exam, and plan as documented by the BRANDEN

## 2022-09-22 NOTE — PROGRESS NOTE ADULT - PROBLEM SELECTOR PLAN 1
Unclear etiology, slight leukocytosis, however UA + CXR negative. Blood cultures pending.   Neurology concerned underlying NPH maybe contributing to current mental status. Patient w/ borderline dementia at baseline.   S/p ativan/haldol in ER due to agitation, still lethargic from it  - Monitor MS closely, per neurology if mental status does not improve 9/23 may consider offering LP   - No need for EEG at this time, suspicion for seizure is low  - Psych consulted for assistance w/ agitation, will avoid further benzos given patient age. Will f/u further recs.  - Possibility that an infection is developing but is not obvious yet. Monitor temp curve closely  - TSH is normal, B12 and folate normal, ammonia level normal     - Dysphagia screen once MS improves  - Neurology consult (d/w them, apprec recs), f/up further recs  - Consider Psych consultation if pt continues to be agitated, concerns of underlying depression given overall decreased PO intake Unclear etiology, slight leukocytosis, however UA + CXR negative. Blood cultures pending.   Neurology concerned underlying NPH maybe contributing to current mental status. Patient w/ borderline dementia at baseline.   S/p ativan/haldol in ER due to agitation, still lethargic from it  - Monitor MS closely, per neurology if mental status does not improve 9/23 may consider offering LP   - No need for EEG at this time, suspicion for seizure is low  - Psych consulted for assistance w/ agitation, will avoid further benzos given patient age. Will f/u further recs.  - Possibility that an infection is developing but is not obvious yet. Monitor temp curve closely  - TSH is normal, B12 and folate normal, ammonia level normal

## 2022-09-22 NOTE — BH CONSULTATION LIAISON ASSESSMENT NOTE - NSBHATTESTBILLINGAW_PSY_A_CORE
[id number]-[place]odny diagnostic evaluation without medical services [id number]-Tbemudlxtbv diagnostic evaluation with medical services

## 2022-09-22 NOTE — CHART NOTE - NSCHARTNOTEFT_GEN_A_CORE
Repeat BMP demonstrated Na 131. Discussed with renal fellow Dr. Bear. Ok to hold standing fluids for now and repeat BMP in morning. Replenished potassium and phosphorus. Will continue to closely monitor patient.     Manoj Stephenson PA-C  Department of Medicine  Pager #[phone]

## 2022-09-22 NOTE — BH CONSULTATION LIAISON ASSESSMENT NOTE - NSBHCONSULTFOLLOWAFTERCARE_PSY_A_CORE FT
no psychiatric contraindications to d/c planning when medically cleared, if behaviors under control, no safety concerns and after safe d/c planning coordinated with family

## 2022-09-22 NOTE — BH CONSULTATION LIAISON ASSESSMENT NOTE - HPI (INCLUDE ILLNESS QUALITY, SEVERITY, DURATION, TIMING, CONTEXT, MODIFYING FACTORS, ASSOCIATED SIGNS AND SYMPTOMS)
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 In ED received Haldol 2.5mg and Ativan 1mg for agitation before CT head performed was also noted to be retaining urine, so a Boudreaux was inserted. Psychiatry consulted for agitation/AMS.    Upon assessment patient is able to respond to loud verbal and physical stimuli - Knows name and states she is in her room for location, as well as, able to state "Biden" as president. Otherwise, patient goes back to sleep/lethargic state and unable to participate further. Family at bedside provided information as above and confirms this is not patient's baseline. Patient had episode of lethargy at home associated with episode of vomiting and since has not eaten. Denies any symptoms of suicidality or psychosis. Confirms h/o cognitive impairment, but still able to carry out most ADLs and has HHA for additional help.

## 2022-09-22 NOTE — CONSULT NOTE ADULT - PROBLEM SELECTOR RECOMMENDATION 9
Pt. admitted with SNa of 136 which has trended to 129 today after 1L NS and maintenance LR @ 100. Please repeat labs. Pt. is clinically dry. Eliseo is 163 and UOsm is 556. Suspect that Na of 129 is a lab error. Please call Nephrology with lab results as treatment for hyponatremia with ongoing hypertension not compatible. Agree with Kanika, Monitor UOP. Avoid retention.     If you have any questions, please feel free to contact me  Alex Bear  Nephrology Fellow  [phone]; Prefer [employer] TEAMS  (After 5pm or on weekends please page the on-call fellow)

## 2022-09-23 DIAGNOSIS — G91.2 (IDIOPATHIC) NORMAL PRESSURE HYDROCEPHALUS: ICD-10-CM

## 2022-09-23 LAB
ANION GAP SERPL CALC-SCNC: 12 MMOL/L — SIGNIFICANT CHANGE UP (ref 7–14)
BASE EXCESS BLDV CALC-SCNC: 2.4 MMOL/L — SIGNIFICANT CHANGE UP (ref -2–3)
BASOPHILS # BLD AUTO: 0.01 K/UL — SIGNIFICANT CHANGE UP (ref 0–0.2)
BASOPHILS NFR BLD AUTO: 0.1 % — SIGNIFICANT CHANGE UP (ref 0–2)
BLOOD GAS VENOUS COMPREHENSIVE RESULT: SIGNIFICANT CHANGE UP
BUN SERPL-MCNC: 18 MG/DL — SIGNIFICANT CHANGE UP (ref 7–23)
CALCIUM SERPL-MCNC: 8.9 MG/DL — SIGNIFICANT CHANGE UP (ref 8.4–10.5)
CHLORIDE BLDV-SCNC: 95 MMOL/L — LOW (ref 96–108)
CHLORIDE SERPL-SCNC: 96 MMOL/L — LOW (ref 98–107)
CO2 BLDV-SCNC: 27.6 MMOL/L — HIGH (ref 22–26)
CO2 SERPL-SCNC: 24 MMOL/L — SIGNIFICANT CHANGE UP (ref 22–31)
CREAT SERPL-MCNC: 0.41 MG/DL — LOW (ref 0.5–1.3)
EGFR: 97 ML/MIN/1.73M2 — SIGNIFICANT CHANGE UP
EOSINOPHIL # BLD AUTO: 0.01 K/UL — SIGNIFICANT CHANGE UP (ref 0–0.5)
EOSINOPHIL NFR BLD AUTO: 0.1 % — SIGNIFICANT CHANGE UP (ref 0–6)
GAS PNL BLDV: 130 MMOL/L — LOW (ref 136–145)
GLUCOSE BLDC GLUCOMTR-MCNC: 114 MG/DL — HIGH (ref 70–99)
GLUCOSE BLDC GLUCOMTR-MCNC: 117 MG/DL — HIGH (ref 70–99)
GLUCOSE BLDC GLUCOMTR-MCNC: 128 MG/DL — HIGH (ref 70–99)
GLUCOSE BLDC GLUCOMTR-MCNC: 142 MG/DL — HIGH (ref 70–99)
GLUCOSE BLDV-MCNC: 126 MG/DL — HIGH (ref 70–99)
GLUCOSE SERPL-MCNC: 125 MG/DL — HIGH (ref 70–99)
HCO3 BLDV-SCNC: 26 MMOL/L — SIGNIFICANT CHANGE UP (ref 22–29)
HCT VFR BLD CALC: 42.4 % — SIGNIFICANT CHANGE UP (ref 34.5–45)
HCT VFR BLDA CALC: 46 % — SIGNIFICANT CHANGE UP (ref 34.5–46.5)
HGB BLD CALC-MCNC: 15.4 G/DL — SIGNIFICANT CHANGE UP (ref 11.5–15.5)
HGB BLD-MCNC: 15.1 G/DL — SIGNIFICANT CHANGE UP (ref 11.5–15.5)
IANC: 6.79 K/UL — SIGNIFICANT CHANGE UP (ref 1.8–7.4)
IMM GRANULOCYTES NFR BLD AUTO: 0.5 % — SIGNIFICANT CHANGE UP (ref 0–0.9)
LACTATE BLDV-MCNC: 1.5 MMOL/L — SIGNIFICANT CHANGE UP (ref 0.5–2)
LYMPHOCYTES # BLD AUTO: 1.34 K/UL — SIGNIFICANT CHANGE UP (ref 1–3.3)
LYMPHOCYTES # BLD AUTO: 14.4 % — SIGNIFICANT CHANGE UP (ref 13–44)
MAGNESIUM SERPL-MCNC: 2.1 MG/DL — SIGNIFICANT CHANGE UP (ref 1.6–2.6)
MCHC RBC-ENTMCNC: 29.6 PG — SIGNIFICANT CHANGE UP (ref 27–34)
MCHC RBC-ENTMCNC: 35.6 GM/DL — SIGNIFICANT CHANGE UP (ref 32–36)
MCV RBC AUTO: 83.1 FL — SIGNIFICANT CHANGE UP (ref 80–100)
MONOCYTES # BLD AUTO: 1.12 K/UL — HIGH (ref 0–0.9)
MONOCYTES NFR BLD AUTO: 12 % — SIGNIFICANT CHANGE UP (ref 2–14)
NEUTROPHILS # BLD AUTO: 6.79 K/UL — SIGNIFICANT CHANGE UP (ref 1.8–7.4)
NEUTROPHILS NFR BLD AUTO: 72.9 % — SIGNIFICANT CHANGE UP (ref 43–77)
NRBC # BLD: 0 /100 WBCS — SIGNIFICANT CHANGE UP (ref 0–0)
NRBC # FLD: 0 K/UL — SIGNIFICANT CHANGE UP (ref 0–0)
PCO2 BLDV: 38 MMHG — LOW (ref 39–42)
PH BLDV: 7.45 — HIGH (ref 7.32–7.43)
PHOSPHATE SERPL-MCNC: 2.4 MG/DL — LOW (ref 2.5–4.5)
PLATELET # BLD AUTO: 223 K/UL — SIGNIFICANT CHANGE UP (ref 150–400)
PO2 BLDV: 82 MMHG — SIGNIFICANT CHANGE UP
POTASSIUM BLDV-SCNC: 3.4 MMOL/L — LOW (ref 3.5–5.1)
POTASSIUM SERPL-MCNC: 3.6 MMOL/L — SIGNIFICANT CHANGE UP (ref 3.5–5.3)
POTASSIUM SERPL-SCNC: 3.6 MMOL/L — SIGNIFICANT CHANGE UP (ref 3.5–5.3)
RBC # BLD: 5.1 M/UL — SIGNIFICANT CHANGE UP (ref 3.8–5.2)
RBC # FLD: 12.4 % — SIGNIFICANT CHANGE UP (ref 10.3–14.5)
SAO2 % BLDV: 96.9 % — SIGNIFICANT CHANGE UP
SODIUM SERPL-SCNC: 132 MMOL/L — LOW (ref 135–145)
WBC # BLD: 9.32 K/UL — SIGNIFICANT CHANGE UP (ref 3.8–10.5)
WBC # FLD AUTO: 9.32 K/UL — SIGNIFICANT CHANGE UP (ref 3.8–10.5)

## 2022-09-23 PROCEDURE — 93306 TTE W/DOPPLER COMPLETE: CPT | Mod: 26

## 2022-09-23 PROCEDURE — 99233 SBSQ HOSP IP/OBS HIGH 50: CPT

## 2022-09-23 RX ORDER — POTASSIUM CHLORIDE 20 MEQ
10 PACKET (EA) ORAL
Refills: 0 | Status: COMPLETED | OUTPATIENT
Start: 2022-09-23 | End: 2022-09-23

## 2022-09-23 RX ADMIN — HEPARIN SODIUM 5000 UNIT(S): 5000 INJECTION INTRAVENOUS; SUBCUTANEOUS at 16:10

## 2022-09-23 RX ADMIN — Medication 100 MILLIEQUIVALENT(S): at 09:51

## 2022-09-23 RX ADMIN — HEPARIN SODIUM 5000 UNIT(S): 5000 INJECTION INTRAVENOUS; SUBCUTANEOUS at 05:11

## 2022-09-23 RX ADMIN — Medication 100 MILLIEQUIVALENT(S): at 08:55

## 2022-09-23 RX ADMIN — Medication 100 MILLIEQUIVALENT(S): at 10:56

## 2022-09-23 RX ADMIN — SENNA PLUS 2 TABLET(S): 8.6 TABLET ORAL at 23:25

## 2022-09-23 RX ADMIN — ATORVASTATIN CALCIUM 40 MILLIGRAM(S): 80 TABLET, FILM COATED ORAL at 23:25

## 2022-09-23 NOTE — SWALLOW BEDSIDE ASSESSMENT ADULT - ADDITIONAL RECOMMENDATIONS
1. Reconsult this service when patient becomes medically optimized/is more accepting of PO trials 2. This service to follow up as schedule permits.

## 2022-09-23 NOTE — PROGRESS NOTE ADULT - ASSESSMENT
Patient ALEKSEY OLIVA is a 82y (1940) woman with a PMHx right femoral neck fracture and right hip hemiarthroplasty, HLD, NPH presents with AMS and recurrent falls.   At baseline, patient ambulates with walker and is oriented x2-3. On exam, Patient more awake today, eyes open, answering questions.     Impression: AMS due to electrolytes imbalance in setting of likely NPH    Recommendation:   -No LP needed at this time  -Follow up with Dr. Burton for further management  -NPH to be managed as outpatient with private neurology         Case seen and discussed with Dr. Nunez.

## 2022-09-23 NOTE — PROGRESS NOTE ADULT - ASSESSMENT
82F with PMHx of Arthritis, Hyperlipidemia and NPH (dx May 2022) who's admitted for AMS/encephalopathy in the setting of NPH.

## 2022-09-23 NOTE — PROGRESS NOTE ADULT - SUBJECTIVE AND OBJECTIVE BOX
Neurology Progress Note    Patient ALEKSEY OLIVA is a 82y (1940) woman with a PMHx right femoral neck fracture and right hip hemiarthroplasty, HLD, NPH presents with AMS and recurrent falls.   At baseline, patient ambulates with walker and is oriented x2-3. Patient more awake today answering questions. Reports no complaints.     REVIEW OF SYSTEMS:  As per HPI.    VITALS & EXAMINATION:  Vital Signs Last 24 Hrs  T(C): 36.9 (23 Sep 2022 10:14), Max: 36.9 (22 Sep 2022 20:31)  T(F): 98.4 (23 Sep 2022 10:14), Max: 98.5 (23 Sep 2022 05:21)  HR: 67 (23 Sep 2022 10:14) (64 - 72)  BP: 145/79 (23 Sep 2022 10:14) (121/52 - 145/79)  BP(mean): --  RR: 16 (23 Sep 2022 10:14) (16 - 17)  SpO2: 97% (23 Sep 2022 10:14) (96% - 98%)    General:  Constitutional: Elderly, cachetic, no acute distress  Resp: breathing comfortably     Neurological (>12):  MS: Awake, alert, oriented to self, place, year  Follows commands. Attends to examiner  Language: Speech is fluent  CNs:  EOMI.  No facial asymmetry b/l.    Motor - Able to spontaneously move all extremities     LABORATORY:  CBC                       15.1   9.32  )-----------( 223      ( 23 Sep 2022 05:39 )             42.4     Chem 09-23    132<L>  |  96<L>  |  18  ----------------------------<  125<H>  3.6   |  24  |  0.41<L>    Ca    8.9      23 Sep 2022 05:39  Phos  2.4     09-23  Mg     2.10     09-23      LFTs   Coagulopathy   Lipid Panel   A1c   Cardiac enzymes CARDIAC MARKERS ( 21 Sep 2022 17:33 )  x     / x     / 384 U/L / x     / x          U/A Urinalysis Basic - ( 21 Sep 2022 11:23 )    Color: Light Yellow / Appearance: Clear / S.021 / pH: x  Gluc: x / Ketone: Moderate  / Bili: Negative / Urobili: <2 mg/dL   Blood: x / Protein: 30 mg/dL / Nitrite: Negative   Leuk Esterase: Negative / RBC: 8 /HPF / WBC 0 /HPF   Sq Epi: x / Non Sq Epi: 1 /HPF / Bacteria: Negative      CSF  Immunological  Other    STUDIES & IMAGING: (EEG, CT, MR, U/S, TTE/KELLY):< from: CT Head No Cont (22 @ 07:00) >  FINDINGS:  No acute intracranial hemorrhage, mass effect or midline shift.  No CT evidence of acute large vascular territory infarct.  The ventricles and cortical sulci are prominent reflecting parenchymal   volume loss. Disproportionate ventriculomegaly, may be seen in normal   pressure hydrocephalus.  Patchy hypodensities in the periventricular white matter are nonspecific,   but likely sequela of small vessel ischemic disease.    The visualized paranasal sinuses and mastoid air cells are well aerated.   The right native ocular lens is surgically absent.  No displaced calvarial fracture.    < end of copied text >

## 2022-09-23 NOTE — PROGRESS NOTE ADULT - SUBJECTIVE AND OBJECTIVE BOX
INTERVAL HPI/OVERNIGHT EVENTS:  Patient more awake on exam. Speaking with this provider. Knows her name, knows she is in a hospital not which one. Does not know year or month. Recognizes the name of her grandson. Following commands. Patient denies pain. ROS otherwise negative.     Vital Signs Last 24 Hrs  T(C): 36.9 (23 Sep 2022 10:14), Max: 36.9 (22 Sep 2022 20:31)  T(F): 98.4 (23 Sep 2022 10:14), Max: 98.5 (23 Sep 2022 05:21)  HR: 67 (23 Sep 2022 10:14) (64 - 72)  BP: 145/79 (23 Sep 2022 10:14) (121/52 - 145/79)  BP(mean): --  RR: 16 (23 Sep 2022 10:14) (16 - 17)  SpO2: 97% (23 Sep 2022 10:14) (96% - 98%)    Parameters below as of 23 Sep 2022 10:14  Patient On (Oxygen Delivery Method): room air    PHYSICAL EXAM:  Constitutional: Awake, conversant   HEENT: sclera non-icteric, neck supple, no masses, + dry lips  Respiratory: Lungs CTAB, no wheezes, rales, or rhonchi   Cardiovascular: RRR, normal S1S2, no M/R/G  Gastrointestinal: soft, NTND, no masses palpable, BS normal  Extremities: Warm, well perfused, pulses equal bilateral upper and lower extremities, no edema   Neurological: alert to name and place (does not know which hospital), does not know month or year. 4/5 strength upper extremities bilaterally. 4/5 strength lower extremities bilaterally. Sensation intact.   Skin: Normal temperature, warm, + dry skin     MEDICATIONS  (STANDING):  atorvastatin 40 milliGRAM(s) Oral at bedtime  heparin   Injectable 5000 Unit(s) SubCutaneous every 12 hours  pantoprazole    Tablet 40 milliGRAM(s) Oral before breakfast  senna 2 Tablet(s) Oral at bedtime    MEDICATIONS  (PRN):  acetaminophen     Tablet .. 650 milliGRAM(s) Oral every 6 hours PRN Temp greater or equal to 38C (100.4F), Mild Pain (1 - 3)        Allergies    penicillin (Other)    Intolerances    LABS:                         15.1   9.32  )-----------( 223      ( 23 Sep 2022 05:39 )             42.4     09-23    132<L>  |  96<L>  |  18  ----------------------------<  125<H>  3.6   |  24  |  0.41<L>    Ca    8.9      23 Sep 2022 05:39  Phos  2.4     09-23  Mg     2.10     09-23          CARDIAC MARKERS ( 21 Sep 2022 17:33 )  x     / x     / 384 U/L / x     / x                RADIOLOGY, EKG & ADDITIONAL TESTS: Reviewed.     CT head:   IMPRESSION:  No acute intracranial hemorrhage or mass effect.    X-ray Pelvis:   No acute displaced fracture.  Incompletely evaluated right hip arthroplasty hip arthroplasty. Moderate   multilevel spondylosis of the lumbar spine.  IMPRESSION:  No acute displaced fracture.    X-ray Chest--> clear lungs     Case Discussed Neurology --> if mental and or functional status does not return to baseline Monday plan for LP    RADIOLOGY & ADDITIONAL TESTS:  Reviewed

## 2022-09-23 NOTE — PROGRESS NOTE ADULT - PROBLEM SELECTOR PLAN 4
Pt takes vesicare for overactive bladder now with urinary retention  No e/o UTI on UA  - TOV now that patient more awake    #Erythrocytosis  - improved however hgb borderline elevated.   - f/u EPO level

## 2022-09-23 NOTE — SWALLOW BEDSIDE ASSESSMENT ADULT - COMMENTS
Neurology note 9/23 "82y (1940) woman with a PMHx right femoral neck fracture and right hip hemiarthroplasty, HLD, NPH presents with AMS and recurrent falls.   At baseline, patient ambulates with walker and is oriented x2-3. On exam, Patient more awake today, eyes open, answering questions. Impression: AMS due to electrolytes imbalance in setting of likely NPH"    CXR 9/22 "Clear lungs."    Patient seen at bedside this afternoon for an initial assessment of the swallow function. Patient did not follow directives and minimally verbalized (i.e. "stop it) despite encouragement from SLP.

## 2022-09-23 NOTE — PROGRESS NOTE ADULT - PROBLEM SELECTOR PLAN 1
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

## 2022-09-23 NOTE — PATIENT PROFILE ADULT - FALL HARM RISK - HARM RISK INTERVENTIONS
Assistance with ambulation/Communicate Risk of Fall with Harm to all staff/Monitor for mental status changes/Monitor gait and stability/Reinforce activity limits and safety measures with patient and family/Reorient to person, place and time as needed/Review medications for side effects contributing to fall risk/Tailored Fall Risk Interventions/Use of alarms - bed, chair and/or voice tab/Visual Cue: Yellow wristband and red socks/Bed in lowest position, wheels locked, appropriate side rails in place/Call bell, personal items and telephone in reach/Instruct patient to call for assistance before getting out of bed or chair/Non-slip footwear when patient is out of bed/[place] to call system/Physically safe environment - no spills, clutter or unnecessary equipment/Purposeful Proactive Rounding/Room/bathroom lighting operational, light cord in reach

## 2022-09-23 NOTE — SWALLOW BEDSIDE ASSESSMENT ADULT - SWALLOW EVAL: RECOMMENDED DIET
1) Defer PO diet to MD given patient refusal 2) Consider short-term non-oral means of nutrition 3) RD consult given patient is at an increased nutritional risk.

## 2022-09-23 NOTE — PROGRESS NOTE ADULT - PROBLEM SELECTOR PLAN 7
Heparin SQ  PT/OT/S&S eval now more awake.       D/w son Logan bedside  Attempted to update Araceli w/o success   D/w Neurology Dr. Nunez   D/w Greg LYONS completed DNR/DNI Heparin SQ  PT/OT/S&S eval now more awake.       D/w son Logan bedside  Updated Araceli Daughter on Phone   D/w Neurology Dr. Nunez   D/w Greg LYONS completed DNR/DNI

## 2022-09-23 NOTE — SWALLOW BEDSIDE ASSESSMENT ADULT - SWALLOW EVAL: DIAGNOSIS
Patient given ice chips around lips/mouth with patient stating "Stop it!". Patient also presented with puree coated teaspoon and thin liquids via straw at which time patient maintained tight jaw seal, despite encouragement from SLP. Oral/pharyngeal swallow could not be adequately assessed given patient refusal.

## 2022-09-24 DIAGNOSIS — D58.2 OTHER HEMOGLOBINOPATHIES: ICD-10-CM

## 2022-09-24 LAB
ANION GAP SERPL CALC-SCNC: 14 MMOL/L — SIGNIFICANT CHANGE UP (ref 7–14)
BASOPHILS # BLD AUTO: 0.02 K/UL — SIGNIFICANT CHANGE UP (ref 0–0.2)
BASOPHILS NFR BLD AUTO: 0.3 % — SIGNIFICANT CHANGE UP (ref 0–2)
BUN SERPL-MCNC: 28 MG/DL — HIGH (ref 7–23)
CALCIUM SERPL-MCNC: 9.4 MG/DL — SIGNIFICANT CHANGE UP (ref 8.4–10.5)
CHLORIDE SERPL-SCNC: 102 MMOL/L — SIGNIFICANT CHANGE UP (ref 98–107)
CO2 SERPL-SCNC: 23 MMOL/L — SIGNIFICANT CHANGE UP (ref 22–31)
CREAT SERPL-MCNC: 0.53 MG/DL — SIGNIFICANT CHANGE UP (ref 0.5–1.3)
EGFR: 91 ML/MIN/1.73M2 — SIGNIFICANT CHANGE UP
EOSINOPHIL # BLD AUTO: 0.03 K/UL — SIGNIFICANT CHANGE UP (ref 0–0.5)
EOSINOPHIL NFR BLD AUTO: 0.4 % — SIGNIFICANT CHANGE UP (ref 0–6)
EPO SERPL-MCNC: 2.8 MIU/ML — SIGNIFICANT CHANGE UP (ref 2.6–18.5)
GLUCOSE SERPL-MCNC: 96 MG/DL — SIGNIFICANT CHANGE UP (ref 70–99)
HCT VFR BLD CALC: 50.4 % — HIGH (ref 34.5–45)
HGB BLD-MCNC: 18 G/DL — HIGH (ref 11.5–15.5)
IANC: 5.09 K/UL — SIGNIFICANT CHANGE UP (ref 1.8–7.4)
IMM GRANULOCYTES NFR BLD AUTO: 0.6 % — SIGNIFICANT CHANGE UP (ref 0–0.9)
LYMPHOCYTES # BLD AUTO: 1.93 K/UL — SIGNIFICANT CHANGE UP (ref 1–3.3)
LYMPHOCYTES # BLD AUTO: 24.2 % — SIGNIFICANT CHANGE UP (ref 13–44)
MAGNESIUM SERPL-MCNC: 2.4 MG/DL — SIGNIFICANT CHANGE UP (ref 1.6–2.6)
MCHC RBC-ENTMCNC: 30.3 PG — SIGNIFICANT CHANGE UP (ref 27–34)
MCHC RBC-ENTMCNC: 35.7 GM/DL — SIGNIFICANT CHANGE UP (ref 32–36)
MCV RBC AUTO: 84.8 FL — SIGNIFICANT CHANGE UP (ref 80–100)
MONOCYTES # BLD AUTO: 0.84 K/UL — SIGNIFICANT CHANGE UP (ref 0–0.9)
MONOCYTES NFR BLD AUTO: 10.6 % — SIGNIFICANT CHANGE UP (ref 2–14)
NEUTROPHILS # BLD AUTO: 5.09 K/UL — SIGNIFICANT CHANGE UP (ref 1.8–7.4)
NEUTROPHILS NFR BLD AUTO: 63.9 % — SIGNIFICANT CHANGE UP (ref 43–77)
NRBC # BLD: 0 /100 WBCS — SIGNIFICANT CHANGE UP (ref 0–0)
NRBC # FLD: 0 K/UL — SIGNIFICANT CHANGE UP (ref 0–0)
PHOSPHATE SERPL-MCNC: 2.8 MG/DL — SIGNIFICANT CHANGE UP (ref 2.5–4.5)
PLATELET # BLD AUTO: 204 K/UL — SIGNIFICANT CHANGE UP (ref 150–400)
POTASSIUM SERPL-MCNC: 3.8 MMOL/L — SIGNIFICANT CHANGE UP (ref 3.5–5.3)
POTASSIUM SERPL-SCNC: 3.8 MMOL/L — SIGNIFICANT CHANGE UP (ref 3.5–5.3)
RBC # BLD: 5.94 M/UL — HIGH (ref 3.8–5.2)
RBC # FLD: 12.9 % — SIGNIFICANT CHANGE UP (ref 10.3–14.5)
SODIUM SERPL-SCNC: 139 MMOL/L — SIGNIFICANT CHANGE UP (ref 135–145)
WBC # BLD: 7.96 K/UL — SIGNIFICANT CHANGE UP (ref 3.8–10.5)
WBC # FLD AUTO: 7.96 K/UL — SIGNIFICANT CHANGE UP (ref 3.8–10.5)

## 2022-09-24 PROCEDURE — 99233 SBSQ HOSP IP/OBS HIGH 50: CPT

## 2022-09-24 RX ADMIN — SENNA PLUS 2 TABLET(S): 8.6 TABLET ORAL at 21:30

## 2022-09-24 RX ADMIN — PANTOPRAZOLE SODIUM 40 MILLIGRAM(S): 20 TABLET, DELAYED RELEASE ORAL at 05:41

## 2022-09-24 RX ADMIN — ATORVASTATIN CALCIUM 40 MILLIGRAM(S): 80 TABLET, FILM COATED ORAL at 21:30

## 2022-09-24 RX ADMIN — HEPARIN SODIUM 5000 UNIT(S): 5000 INJECTION INTRAVENOUS; SUBCUTANEOUS at 05:41

## 2022-09-24 RX ADMIN — HEPARIN SODIUM 5000 UNIT(S): 5000 INJECTION INTRAVENOUS; SUBCUTANEOUS at 16:57

## 2022-09-24 NOTE — PROVIDER CONTACT NOTE (OTHER) - ASSESSMENT
Patient is confused and huge fall risk
Patient s/p hloman. absorbant pad is wet
Patient is confused and unable to get out of bed by her self

## 2022-09-24 NOTE — PROVIDER CONTACT NOTE (OTHER) - BACKGROUND
admitted for altered mental status

## 2022-09-24 NOTE — PROGRESS NOTE ADULT - SUBJECTIVE AND OBJECTIVE BOX
[facility] Division of Hospital Medicine  Heather Makc MD  Pager [phone]    Patient is a 82y old  Female who presents with a chief complaint of AMS      SUBJECTIVE / OVERNIGHT EVENTS: alert, seems close to baseline; being washed up by PCA, asking for lotion; I told her that I am friends with her grandson; she said, "oh you are, that's nice, he's good"        MEDICATIONS  (STANDING):  atorvastatin 40 milliGRAM(s) Oral at bedtime  heparin   Injectable 5000 Unit(s) SubCutaneous every 12 hours  pantoprazole    Tablet 40 milliGRAM(s) Oral before breakfast  senna 2 Tablet(s) Oral at bedtime    MEDICATIONS  (PRN):  acetaminophen     Tablet .. 650 milliGRAM(s) Oral every 6 hours PRN Temp greater or equal to 38C (100.4F), Mild Pain (1 - 3)      CAPILLARY BLOOD GLUCOSE  POCT Blood Glucose.: 114 mg/dL (23 Sep 2022 18:51)          PHYSICAL EXAM:  Vital Signs Last 24 Hrs  T(F): 97.6 (24 Sep 2022 12:35), Max: 98.3 (24 Sep 2022 02:30)  HR: 78 (24 Sep 2022 12:35) (78 - 85)  BP: 155/89 (24 Sep 2022 12:35) (120/76 - 155/89)  RR: 17 (24 Sep 2022 12:35) (17 - 17)  SpO2: 99% (24 Sep 2022 12:35) (98% - 99%)    Parameters below as of 24 Sep 2022 12:35  Patient On (Oxygen Delivery Method): room air        CONSTITUTIONAL: NAD, appears comfortable  EYES: PERRLA; conjunctiva and sclera clear  ENMT: Moist oral mucosa; normal dentition  RESPIRATORY: Normal respiratory effort; lungs are clear to auscultation bilaterally  CARDIOVASCULAR: Regular rate and rhythm; No lower extremity edema;  ABDOMEN: Nontender to palpation, normoactive bowel sounds  MUSCULOSKELETAL: no clubbing or cyanosis of digits; no joint swelling or tenderness to palpation  PSYCH: calm, coop; affect appropriate  NEUROLOGY: alert, conversant, moves all ext  SKIN: No rashes; no palpable lesions    LABS:                        18.0   7.96  )-----------( 204      ( 24 Sep 2022 02:55 )             50.4     09-24    139  |  102  |  28<H>  ----------------------------<  96  3.8   |  23  |  0.53    Ca    9.4      24 Sep 2022 02:55  Phos  2.8     09-24  Mg     2.40     09-24                Culture - Blood (collected 21 Sep 2022 17:30)  Source: .Blood Blood-Peripheral  Preliminary Report (22 Sep 2022 22:02):    No growth to date.    Culture - Blood (collected 21 Sep 2022 17:15)  Source: .Blood Blood-Peripheral  Preliminary Report (22 Sep 2022 22:02):    No growth to date.

## 2022-09-24 NOTE — PROVIDER CONTACT NOTE (OTHER) - REASON
Patient did not urinated within 8 hours
Tried to place patient on commode at bedside. Patient unable to get up and sit on commode
Patient jumping out of bed huge fall risk

## 2022-09-24 NOTE — PROVIDER CONTACT NOTE (OTHER) - ACTION/TREATMENT ORDERED:
Notified provider will continue to monitor for safety
Bladdered scan patient more than 300 ml in bladder. Straigth cath patient as per protocol
Will attempt to place patient on commode q 4 hours

## 2022-09-24 NOTE — PROGRESS NOTE ADULT - PROBLEM SELECTOR PLAN 4
Pt takes vesicare for overactive bladder now with urinary retention  No e/o UTI on UA  - TOV now that patient more awake  freq toileting to assist with void

## 2022-09-24 NOTE — PROGRESS NOTE ADULT - PROBLEM SELECTOR PLAN 7
Heparin SQ  PT/OT/S&S eval now more awake.       D/w son Logan bedside  Updated Araceli Daughter on Phone   D/w Neurology Dr. Nunez   D/w Greg LYONS completed DNR/DNI

## 2022-09-24 NOTE — PROGRESS NOTE ADULT - PROBLEM SELECTOR PLAN 1
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

## 2022-09-25 LAB
ANION GAP SERPL CALC-SCNC: 14 MMOL/L — SIGNIFICANT CHANGE UP (ref 7–14)
BASOPHILS # BLD AUTO: 0.02 K/UL — SIGNIFICANT CHANGE UP (ref 0–0.2)
BASOPHILS NFR BLD AUTO: 0.3 % — SIGNIFICANT CHANGE UP (ref 0–2)
BUN SERPL-MCNC: 34 MG/DL — HIGH (ref 7–23)
CALCIUM SERPL-MCNC: 9.1 MG/DL — SIGNIFICANT CHANGE UP (ref 8.4–10.5)
CHLORIDE SERPL-SCNC: 103 MMOL/L — SIGNIFICANT CHANGE UP (ref 98–107)
CO2 SERPL-SCNC: 24 MMOL/L — SIGNIFICANT CHANGE UP (ref 22–31)
CREAT SERPL-MCNC: 0.42 MG/DL — LOW (ref 0.5–1.3)
EGFR: 96 ML/MIN/1.73M2 — SIGNIFICANT CHANGE UP
EOSINOPHIL # BLD AUTO: 0.05 K/UL — SIGNIFICANT CHANGE UP (ref 0–0.5)
EOSINOPHIL NFR BLD AUTO: 0.7 % — SIGNIFICANT CHANGE UP (ref 0–6)
GLUCOSE SERPL-MCNC: 119 MG/DL — HIGH (ref 70–99)
HCT VFR BLD CALC: 51.7 % — HIGH (ref 34.5–45)
HGB BLD-MCNC: 18 G/DL — HIGH (ref 11.5–15.5)
IANC: 4.97 K/UL — SIGNIFICANT CHANGE UP (ref 1.8–7.4)
IMM GRANULOCYTES NFR BLD AUTO: 0.7 % — SIGNIFICANT CHANGE UP (ref 0–0.9)
LYMPHOCYTES # BLD AUTO: 1.75 K/UL — SIGNIFICANT CHANGE UP (ref 1–3.3)
LYMPHOCYTES # BLD AUTO: 23.1 % — SIGNIFICANT CHANGE UP (ref 13–44)
MAGNESIUM SERPL-MCNC: 2.3 MG/DL — SIGNIFICANT CHANGE UP (ref 1.6–2.6)
MCHC RBC-ENTMCNC: 29.8 PG — SIGNIFICANT CHANGE UP (ref 27–34)
MCHC RBC-ENTMCNC: 34.8 GM/DL — SIGNIFICANT CHANGE UP (ref 32–36)
MCV RBC AUTO: 85.5 FL — SIGNIFICANT CHANGE UP (ref 80–100)
MONOCYTES # BLD AUTO: 0.72 K/UL — SIGNIFICANT CHANGE UP (ref 0–0.9)
MONOCYTES NFR BLD AUTO: 9.5 % — SIGNIFICANT CHANGE UP (ref 2–14)
NEUTROPHILS # BLD AUTO: 4.97 K/UL — SIGNIFICANT CHANGE UP (ref 1.8–7.4)
NEUTROPHILS NFR BLD AUTO: 65.7 % — SIGNIFICANT CHANGE UP (ref 43–77)
NRBC # BLD: 0 /100 WBCS — SIGNIFICANT CHANGE UP (ref 0–0)
NRBC # FLD: 0 K/UL — SIGNIFICANT CHANGE UP (ref 0–0)
PHOSPHATE SERPL-MCNC: 3 MG/DL — SIGNIFICANT CHANGE UP (ref 2.5–4.5)
PLATELET # BLD AUTO: 212 K/UL — SIGNIFICANT CHANGE UP (ref 150–400)
POTASSIUM SERPL-MCNC: 3.7 MMOL/L — SIGNIFICANT CHANGE UP (ref 3.5–5.3)
POTASSIUM SERPL-SCNC: 3.7 MMOL/L — SIGNIFICANT CHANGE UP (ref 3.5–5.3)
PYRIDOXAL PHOS SERPL-MCNC: 5.9 UG/L — SIGNIFICANT CHANGE UP (ref 3.4–65.2)
RBC # BLD: 6.05 M/UL — HIGH (ref 3.8–5.2)
RBC # FLD: 12.7 % — SIGNIFICANT CHANGE UP (ref 10.3–14.5)
SODIUM SERPL-SCNC: 141 MMOL/L — SIGNIFICANT CHANGE UP (ref 135–145)
WBC # BLD: 7.56 K/UL — SIGNIFICANT CHANGE UP (ref 3.8–10.5)
WBC # FLD AUTO: 7.56 K/UL — SIGNIFICANT CHANGE UP (ref 3.8–10.5)

## 2022-09-25 PROCEDURE — 99233 SBSQ HOSP IP/OBS HIGH 50: CPT

## 2022-09-25 RX ORDER — SODIUM CHLORIDE 9 MG/ML
500 INJECTION INTRAMUSCULAR; INTRAVENOUS; SUBCUTANEOUS ONCE
Refills: 0 | Status: COMPLETED | OUTPATIENT
Start: 2022-09-25 | End: 2022-09-25

## 2022-09-25 RX ADMIN — HEPARIN SODIUM 5000 UNIT(S): 5000 INJECTION INTRAVENOUS; SUBCUTANEOUS at 06:15

## 2022-09-25 RX ADMIN — ATORVASTATIN CALCIUM 40 MILLIGRAM(S): 80 TABLET, FILM COATED ORAL at 21:32

## 2022-09-25 RX ADMIN — PANTOPRAZOLE SODIUM 40 MILLIGRAM(S): 20 TABLET, DELAYED RELEASE ORAL at 06:14

## 2022-09-25 RX ADMIN — SODIUM CHLORIDE 500 MILLILITER(S): 9 INJECTION INTRAMUSCULAR; INTRAVENOUS; SUBCUTANEOUS at 10:24

## 2022-09-25 RX ADMIN — SENNA PLUS 2 TABLET(S): 8.6 TABLET ORAL at 21:31

## 2022-09-25 RX ADMIN — HEPARIN SODIUM 5000 UNIT(S): 5000 INJECTION INTRAVENOUS; SUBCUTANEOUS at 17:06

## 2022-09-25 NOTE — PROGRESS NOTE ADULT - SUBJECTIVE AND OBJECTIVE BOX
[facility] Division of Hospital Medicine  Heather Mack MD  Pager [phone]    Patient is a 82y old  Female who presents with a chief complaint of AMS      SUBJECTIVE / OVERNIGHT EVENTS: sleeping this AM; per RN did not sleep much last night; last bladder scan with low volume; wet pad in bed; cont to monitor      MEDICATIONS  (STANDING):  atorvastatin 40 milliGRAM(s) Oral at bedtime  heparin   Injectable 5000 Unit(s) SubCutaneous every 12 hours  pantoprazole    Tablet 40 milliGRAM(s) Oral before breakfast  senna 2 Tablet(s) Oral at bedtime    MEDICATIONS  (PRN):  acetaminophen     Tablet .. 650 milliGRAM(s) Oral every 6 hours PRN Temp greater or equal to 38C (100.4F), Mild Pain (1 - 3)      PHYSICAL EXAM:  Vital Signs Last 24 Hrs  T(F): 97.2 (25 Sep 2022 04:52), Max: 98 (24 Sep 2022 21:30)  HR: 88 (25 Sep 2022 04:52) (78 - 98)  BP: 156/90 (25 Sep 2022 04:52) (132/83 - 156/90)  RR: 17 (25 Sep 2022 04:52) (16 - 18)  SpO2: 98% (25 Sep 2022 04:52) (95% - 100%)    Parameters below as of 25 Sep 2022 04:52  Patient On (Oxygen Delivery Method): room air        CONSTITUTIONAL: NAD, appears comfortable  EYES: PERRLA; conjunctiva and sclera clear  ENMT: Moist oral mucosa; normal dentition  RESPIRATORY: Normal respiratory effort; grossly b/l AE  CARDIOVASCULAR: Regular rate and rhythm; No lower extremity edema;  ABDOMEN: Nontender to palpation, normoactive bowel sounds  MUSCULOSKELETAL:  no clubbing or cyanosis of digits; no joint swelling or tenderness to palpation  PSYCH: sleeping   NEUROLOGY: unable to assess  SKIN: No rashes; no palpable lesions    LABS:                        18.0   7.56  )-----------( 212      ( 25 Sep 2022 06:10 )             51.7     09-25    141  |  103  |  34<H>  ----------------------------<  119<H>  3.7   |  24  |  0.42<L>    Ca    9.1      25 Sep 2022 06:10  Phos  3.0     09-25  Mg     2.30     09-25

## 2022-09-25 NOTE — PROGRESS NOTE ADULT - PROBLEM SELECTOR PLAN 1
"Encounter Date: 4/13/2022    SCRIBE #1 NOTE: I, Hope Karen, am scribing for, and in the presence of,  Gabriel Fontaine MD. I have scribed the following portions of the note - Other sections scribed: HPI; ROS; PE.       History     Chief Complaint   Patient presents with    URI     Pt states," I have a cough and sore throat. I have a stuffy nose and my eyes are red."     Prieto Madrigal is a 25 y.o. female with no pertinent medical Hx who presents to the ED for chief complaint of rhinorrhea, sore throat, voice change, otalgia, conjunctivitis, and headache onset 5 days ago. Patient reports she went to Urgent care 5 days ago for the same symptoms and was tested for COVID, Flu, and Step throat with all tests negative. She states that she has thick clumps of mucous coming out of her nose. Patient notes her daughter had conjunctivitis and may have gotten it from her. She reports she does not have any medical problems or PSHx. Patient states she does suffer from seasonal allergies. She notes she does not use tobacco, but does use EtOH and marijuana. Patient denies abdominal pain, nausea, vomiting, or diarrhea.     The history is provided by the patient. No  was used.     Review of patient's allergies indicates:  No Known Allergies  Past Medical History:   Diagnosis Date    Abnormal Pap smear of cervix     Heart valve disorder      No past surgical history on file.  Family History   Problem Relation Age of Onset    Cancer Maternal Grandmother      Social History     Tobacco Use    Smoking status: Never Smoker    Smokeless tobacco: Never Used   Substance Use Topics    Alcohol use: No    Drug use: No     Review of Systems   Constitutional: Negative for chills and fever.   HENT: Positive for ear pain, postnasal drip, sore throat and voice change. Negative for congestion.    Eyes: Positive for redness. Negative for pain and visual disturbance.   Respiratory: Negative for chest tightness and " shortness of breath.    Cardiovascular: Negative for chest pain.   Gastrointestinal: Negative for nausea.   Endocrine: Negative for polydipsia and polyuria.   Genitourinary: Negative for dysuria and flank pain.   Musculoskeletal: Negative for back pain, neck pain and neck stiffness.   Skin: Negative for rash.   Allergic/Immunologic: Negative for immunocompromised state.   Neurological: Positive for headaches. Negative for dizziness and weakness.   Hematological: Does not bruise/bleed easily.   Psychiatric/Behavioral: Negative for agitation and behavioral problems.   All other systems reviewed and are negative.      Physical Exam     Initial Vitals [04/13/22 0842]   BP Pulse Resp Temp SpO2   135/86 86 16 98.3 °F (36.8 °C) 98 %      MAP       --         Physical Exam    Nursing note and vitals reviewed.  Constitutional: She appears well-developed and well-nourished.   HENT:   Head: Normocephalic and atraumatic.   Nose: No rhinorrhea.   Mouth/Throat: Oropharynx is clear and moist and mucous membranes are normal.   Patient presents with post nasal drip.   Eyes: Conjunctivae and EOM are normal. Pupils are equal, round, and reactive to light. Right conjunctiva is not injected. Left conjunctiva is not injected. No scleral icterus.   Neck: Neck supple.   Normal range of motion.   Full passive range of motion without pain.     Cardiovascular: Normal rate, regular rhythm, S1 normal, S2 normal, normal heart sounds and normal pulses. Exam reveals no gallop and no friction rub.    No murmur heard.  Pulses:       Radial pulses are 2+ on the right side and 2+ on the left side.   Pulmonary/Chest: Effort normal and breath sounds normal. No respiratory distress. She has no decreased breath sounds. She has no wheezes.   Abdominal: Abdomen is soft. She exhibits no distension. There is no abdominal tenderness.   Musculoskeletal:         General: No edema. Normal range of motion.      Cervical back: Full passive range of motion without  pain, normal range of motion and neck supple.      Comments: Good active ROM of all extremities. No lower extremity edema or cyanosis.      Neurological: No cranial nerve deficit. Gait normal.   A&Ox4, normal speech.   Skin: Skin is warm. No ecchymosis and no rash noted.   Psychiatric: She has a normal mood and affect. Thought content normal.         ED Course   Procedures  Labs Reviewed   SARS-COV-2 (COVID-19) QUALITATIVE PCR   POCT URINE PREGNANCY   POCT INFLUENZA A/B MOLECULAR   POCT STREP A MOLECULAR   POCT STREP A, RAPID   POCT RAPID INFLUENZA A/B          Imaging Results    None          Medications - No data to display  Medical Decision Making:   Initial Assessment:    25yr old otherwise healthy patient presenting with constellation of symptoms likely representing uncomplicated viral upper respiratory symptoms as characterized by mild pharyngitis and postnasal drip.    Also considered but less likely:  PTA/RPA: no hot potato voice, no uvular deviation,  Esophageal rupture: No history of dysphagia  Unlikely deep space infection/Kunals  Low suspicion for CNS infection bacterial sinusitis, or pneumonia given exam and history.  Strep, flu, urine pregnancy negative.  Pending COVID result.  Doubt COVID.  Likely URI.  To quarantine.  Not consistent with bacterial conjunctivitis.  Will attempt to alleviate symptoms conservatively; no overt indications at this time for antibiotics. Patient given medications below.. No respiratory distress, otherwise relatively well appearing and nontoxic. Return precautions given, patient understands and agrees with plan. All questions answered.  Instructed to follow up with PCP.    Clinical Tests:   Lab Tests: Ordered and Reviewed          Scribe Attestation:   Scribe #1: I performed the above scribed service and the documentation accurately describes the services I performed. I attest to the accuracy of the note.               I, Gabriel Fontaine, personally performed the services  described in this documentation. All medical record entries made by the scribe were at my direction and in my presence. I have reviewed the chart and agree that the record reflects my personal performance and is accurate and complete.      Clinical Impression:   Final diagnoses:  [J06.9] Viral URI (Primary)          ED Disposition Condition    Discharge Stable        ED Prescriptions     Medication Sig Dispense Start Date End Date Auth. Provider    cetirizine (ZYRTEC) 10 MG tablet Take 1 tablet (10 mg total) by mouth once daily. for 5 days 5 tablet 4/13/2022 4/18/2022 Gabriel Fontaine MD    fexofenadine (ALLEGRA) 180 MG tablet Take 1 tablet (180 mg total) by mouth once daily. 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

## 2022-09-26 LAB
ANION GAP SERPL CALC-SCNC: 12 MMOL/L — SIGNIFICANT CHANGE UP (ref 7–14)
BASOPHILS # BLD AUTO: 0.02 K/UL — SIGNIFICANT CHANGE UP (ref 0–0.2)
BASOPHILS NFR BLD AUTO: 0.3 % — SIGNIFICANT CHANGE UP (ref 0–2)
BUN SERPL-MCNC: 40 MG/DL — HIGH (ref 7–23)
CALCIUM SERPL-MCNC: 8.7 MG/DL — SIGNIFICANT CHANGE UP (ref 8.4–10.5)
CHLORIDE SERPL-SCNC: 107 MMOL/L — SIGNIFICANT CHANGE UP (ref 98–107)
CHOLEST SERPL-MCNC: 218 MG/DL — HIGH
CO2 SERPL-SCNC: 25 MMOL/L — SIGNIFICANT CHANGE UP (ref 22–31)
CREAT SERPL-MCNC: 0.52 MG/DL — SIGNIFICANT CHANGE UP (ref 0.5–1.3)
CULTURE RESULTS: SIGNIFICANT CHANGE UP
CULTURE RESULTS: SIGNIFICANT CHANGE UP
EGFR: 92 ML/MIN/1.73M2 — SIGNIFICANT CHANGE UP
EOSINOPHIL # BLD AUTO: 0.14 K/UL — SIGNIFICANT CHANGE UP (ref 0–0.5)
EOSINOPHIL NFR BLD AUTO: 2.2 % — SIGNIFICANT CHANGE UP (ref 0–6)
GLUCOSE SERPL-MCNC: 114 MG/DL — HIGH (ref 70–99)
HCT VFR BLD CALC: 48.2 % — HIGH (ref 34.5–45)
HDLC SERPL-MCNC: 60 MG/DL — SIGNIFICANT CHANGE UP
HGB BLD-MCNC: 16.9 G/DL — HIGH (ref 11.5–15.5)
IANC: 3.54 K/UL — SIGNIFICANT CHANGE UP (ref 1.8–7.4)
IMM GRANULOCYTES NFR BLD AUTO: 0.6 % — SIGNIFICANT CHANGE UP (ref 0–0.9)
LIPID PNL WITH DIRECT LDL SERPL: 128 MG/DL — HIGH
LYMPHOCYTES # BLD AUTO: 2.24 K/UL — SIGNIFICANT CHANGE UP (ref 1–3.3)
LYMPHOCYTES # BLD AUTO: 34.7 % — SIGNIFICANT CHANGE UP (ref 13–44)
MAGNESIUM SERPL-MCNC: 2.2 MG/DL — SIGNIFICANT CHANGE UP (ref 1.6–2.6)
MCHC RBC-ENTMCNC: 30.1 PG — SIGNIFICANT CHANGE UP (ref 27–34)
MCHC RBC-ENTMCNC: 35.1 GM/DL — SIGNIFICANT CHANGE UP (ref 32–36)
MCV RBC AUTO: 85.9 FL — SIGNIFICANT CHANGE UP (ref 80–100)
MONOCYTES # BLD AUTO: 0.48 K/UL — SIGNIFICANT CHANGE UP (ref 0–0.9)
MONOCYTES NFR BLD AUTO: 7.4 % — SIGNIFICANT CHANGE UP (ref 2–14)
NEUTROPHILS # BLD AUTO: 3.54 K/UL — SIGNIFICANT CHANGE UP (ref 1.8–7.4)
NEUTROPHILS NFR BLD AUTO: 54.8 % — SIGNIFICANT CHANGE UP (ref 43–77)
NON HDL CHOLESTEROL: 158 MG/DL — HIGH
NRBC # BLD: 0 /100 WBCS — SIGNIFICANT CHANGE UP (ref 0–0)
NRBC # FLD: 0 K/UL — SIGNIFICANT CHANGE UP (ref 0–0)
PHOSPHATE SERPL-MCNC: 3.6 MG/DL — SIGNIFICANT CHANGE UP (ref 2.5–4.5)
PLATELET # BLD AUTO: 217 K/UL — SIGNIFICANT CHANGE UP (ref 150–400)
POTASSIUM SERPL-MCNC: 3.8 MMOL/L — SIGNIFICANT CHANGE UP (ref 3.5–5.3)
POTASSIUM SERPL-SCNC: 3.8 MMOL/L — SIGNIFICANT CHANGE UP (ref 3.5–5.3)
RBC # BLD: 5.61 M/UL — HIGH (ref 3.8–5.2)
RBC # FLD: 12.9 % — SIGNIFICANT CHANGE UP (ref 10.3–14.5)
SODIUM SERPL-SCNC: 144 MMOL/L — SIGNIFICANT CHANGE UP (ref 135–145)
SPECIMEN SOURCE: SIGNIFICANT CHANGE UP
SPECIMEN SOURCE: SIGNIFICANT CHANGE UP
TRIGL SERPL-MCNC: 149 MG/DL — SIGNIFICANT CHANGE UP
WBC # BLD: 6.46 K/UL — SIGNIFICANT CHANGE UP (ref 3.8–10.5)
WBC # FLD AUTO: 6.46 K/UL — SIGNIFICANT CHANGE UP (ref 3.8–10.5)

## 2022-09-26 PROCEDURE — 99233 SBSQ HOSP IP/OBS HIGH 50: CPT

## 2022-09-26 RX ADMIN — PANTOPRAZOLE SODIUM 40 MILLIGRAM(S): 20 TABLET, DELAYED RELEASE ORAL at 05:59

## 2022-09-26 RX ADMIN — ATORVASTATIN CALCIUM 40 MILLIGRAM(S): 80 TABLET, FILM COATED ORAL at 22:06

## 2022-09-26 RX ADMIN — SENNA PLUS 2 TABLET(S): 8.6 TABLET ORAL at 22:06

## 2022-09-26 RX ADMIN — HEPARIN SODIUM 5000 UNIT(S): 5000 INJECTION INTRAVENOUS; SUBCUTANEOUS at 17:16

## 2022-09-26 RX ADMIN — HEPARIN SODIUM 5000 UNIT(S): 5000 INJECTION INTRAVENOUS; SUBCUTANEOUS at 05:56

## 2022-09-26 NOTE — PROGRESS NOTE ADULT - PROBLEM SELECTOR PLAN 4
Pt takes vesicare for overactive bladder now with urinary retention  No e/o UTI on UA  - TOV now that patient more awake  freq toileting to assist with void Pt takes vesicare for overactive bladder now with urinary retention  No e/o UTI on UA  - holman removed, s/p TOV   -freq toileting to assist with void

## 2022-09-26 NOTE — PROGRESS NOTE ADULT - SUBJECTIVE AND OBJECTIVE BOX
Patient is a 82y old  Female who presents with a chief complaint of AMS (25 Sep 2022 09:30)      SUBJECTIVE / OVERNIGHT EVENTS:    MEDICATIONS  (STANDING):  atorvastatin 40 milliGRAM(s) Oral at bedtime  heparin   Injectable 5000 Unit(s) SubCutaneous every 12 hours  pantoprazole    Tablet 40 milliGRAM(s) Oral before breakfast  senna 2 Tablet(s) Oral at bedtime    MEDICATIONS  (PRN):  acetaminophen     Tablet .. 650 milliGRAM(s) Oral every 6 hours PRN Temp greater or equal to 38C (100.4F), Mild Pain (1 - 3)      Vital Signs Last 24 Hrs  T(C): 36.3 (26 Sep 2022 06:12), Max: 37.1 (25 Sep 2022 21:10)  T(F): 97.4 (26 Sep 2022 06:12), Max: 98.8 (25 Sep 2022 21:10)  HR: 75 (26 Sep 2022 06:12) (75 - 97)  BP: 144/82 (26 Sep 2022 06:12) (117/72 - 144/92)  BP(mean): --  RR: 17 (26 Sep 2022 06:12) (17 - 19)  SpO2: 100% (26 Sep 2022 06:12) (99% - 100%)    Parameters below as of 26 Sep 2022 06:12  Patient On (Oxygen Delivery Method): room air      CAPILLARY BLOOD GLUCOSE        I&O's Summary      PHYSICAL EXAM:  GENERAL: NAD, well-developed  HEAD:  Atraumatic, Normocephalic  EYES: EOMI, PERRLA, conjunctiva and sclera clear  NECK: Supple, No JVD  CHEST/LUNG: Clear to auscultation bilaterally; No wheeze  HEART: Regular rate and rhythm; No murmurs, rubs, or gallops  ABDOMEN: Soft, Nontender, Nondistended; Bowel sounds present  EXTREMITIES:  2+ Peripheral Pulses, No clubbing, cyanosis, or edema  PSYCH: AAOx3  NEUROLOGY: non-focal  SKIN: No rashes or lesions    LABS:                        16.9   6.46  )-----------( 217      ( 26 Sep 2022 06:10 )             48.2     09-26    144  |  107  |  40<H>  ----------------------------<  114<H>  3.8   |  25  |  0.52    Ca    8.7      26 Sep 2022 06:10  Phos  3.6     09-26  Mg     2.20     09-26                RADIOLOGY & ADDITIONAL TESTS:    Imaging Personally Reviewed:    Consultant(s) Notes Reviewed:      Care Discussed with Consultants/Other Providers:   Patient is a 82y old  Female who presents with a chief complaint of AMS (25 Sep 2022 09:30)      SUBJECTIVE / OVERNIGHT EVENTS: patient seen and examined by bedside, denies headache, dizziness, SOB, CP, Palpitations , N/V/D, abdominal pain  pt says she is in  bed when asked about her location , not able to state the month or year correctly       MEDICATIONS  (STANDING):  atorvastatin 40 milliGRAM(s) Oral at bedtime  heparin   Injectable 5000 Unit(s) SubCutaneous every 12 hours  pantoprazole    Tablet 40 milliGRAM(s) Oral before breakfast  senna 2 Tablet(s) Oral at bedtime    MEDICATIONS  (PRN):  acetaminophen     Tablet .. 650 milliGRAM(s) Oral every 6 hours PRN Temp greater or equal to 38C (100.4F), Mild Pain (1 - 3)      Vital Signs Last 24 Hrs  T(C): 36.3 (26 Sep 2022 06:12), Max: 37.1 (25 Sep 2022 21:10)  T(F): 97.4 (26 Sep 2022 06:12), Max: 98.8 (25 Sep 2022 21:10)  HR: 75 (26 Sep 2022 06:12) (75 - 97)  BP: 144/82 (26 Sep 2022 06:12) (117/72 - 144/92)  BP(mean): --  RR: 17 (26 Sep 2022 06:12) (17 - 19)  SpO2: 100% (26 Sep 2022 06:12) (99% - 100%)    Parameters below as of 26 Sep 2022 06:12  Patient On (Oxygen Delivery Method): room air      CAPILLARY BLOOD GLUCOSE        I&O's Summary      PHYSICAL EXAM:  CONSTITUTIONAL: NAD, appears comfortable  EYES: PERRLA; conjunctiva and sclera clear  ENMT: Moist oral mucosa; normal dentition  RESPIRATORY: Normal respiratory effort; grossly b/l AE  CARDIOVASCULAR: Regular rate and rhythm; No lower extremity edema;  ABDOMEN: Nontender to palpation, normoactive bowel sounds  MUSCULOSKELETAL:  no clubbing or cyanosis of digits; no joint swelling or tenderness to palpation  PSYCH: calm , answering questions appropriately   NEUROLOGY: unable to assess  SKIN: No rashes; no palpable lesions      LABS:                        16.9   6.46  )-----------( 217      ( 26 Sep 2022 06:10 )             48.2      09-26    144  |  107  |  40<H>  ----------------------------<  114<H>  3.8   |  25  |  0.52    Ca    8.7      26 Sep 2022 06:10  Phos  3.6     09-26  Mg     2.20     09-26                RADIOLOGY & ADDITIONAL TESTS:    Imaging Personally Reviewed:    Consultant(s) Notes Reviewed:      Care Discussed with Consultants/Other Providers:

## 2022-09-26 NOTE — PROGRESS NOTE ADULT - PROBLEM SELECTOR PLAN 1
According to records on site, patient did not have TB read.  Unable to leave voicemail as the mailbox was full.  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

## 2022-09-27 LAB
ANION GAP SERPL CALC-SCNC: 12 MMOL/L — SIGNIFICANT CHANGE UP (ref 7–14)
BASOPHILS # BLD AUTO: 0.03 K/UL — SIGNIFICANT CHANGE UP (ref 0–0.2)
BASOPHILS NFR BLD AUTO: 0.5 % — SIGNIFICANT CHANGE UP (ref 0–2)
BUN SERPL-MCNC: 41 MG/DL — HIGH (ref 7–23)
CALCIUM SERPL-MCNC: 9 MG/DL — SIGNIFICANT CHANGE UP (ref 8.4–10.5)
CHLORIDE SERPL-SCNC: 106 MMOL/L — SIGNIFICANT CHANGE UP (ref 98–107)
CO2 SERPL-SCNC: 24 MMOL/L — SIGNIFICANT CHANGE UP (ref 22–31)
CREAT SERPL-MCNC: 0.5 MG/DL — SIGNIFICANT CHANGE UP (ref 0.5–1.3)
EGFR: 92 ML/MIN/1.73M2 — SIGNIFICANT CHANGE UP
EOSINOPHIL # BLD AUTO: 0.15 K/UL — SIGNIFICANT CHANGE UP (ref 0–0.5)
EOSINOPHIL NFR BLD AUTO: 2.4 % — SIGNIFICANT CHANGE UP (ref 0–6)
GLUCOSE SERPL-MCNC: 120 MG/DL — HIGH (ref 70–99)
HCT VFR BLD CALC: 46.6 % — HIGH (ref 34.5–45)
HGB BLD-MCNC: 16.2 G/DL — HIGH (ref 11.5–15.5)
IANC: 3.28 K/UL — SIGNIFICANT CHANGE UP (ref 1.8–7.4)
IMM GRANULOCYTES NFR BLD AUTO: 0.6 % — SIGNIFICANT CHANGE UP (ref 0–0.9)
LYMPHOCYTES # BLD AUTO: 2.15 K/UL — SIGNIFICANT CHANGE UP (ref 1–3.3)
LYMPHOCYTES # BLD AUTO: 34.8 % — SIGNIFICANT CHANGE UP (ref 13–44)
MAGNESIUM SERPL-MCNC: 2.1 MG/DL — SIGNIFICANT CHANGE UP (ref 1.6–2.6)
MCHC RBC-ENTMCNC: 29.9 PG — SIGNIFICANT CHANGE UP (ref 27–34)
MCHC RBC-ENTMCNC: 34.8 GM/DL — SIGNIFICANT CHANGE UP (ref 32–36)
MCV RBC AUTO: 86.1 FL — SIGNIFICANT CHANGE UP (ref 80–100)
MONOCYTES # BLD AUTO: 0.52 K/UL — SIGNIFICANT CHANGE UP (ref 0–0.9)
MONOCYTES NFR BLD AUTO: 8.4 % — SIGNIFICANT CHANGE UP (ref 2–14)
NEUTROPHILS # BLD AUTO: 3.28 K/UL — SIGNIFICANT CHANGE UP (ref 1.8–7.4)
NEUTROPHILS NFR BLD AUTO: 53.3 % — SIGNIFICANT CHANGE UP (ref 43–77)
NRBC # BLD: 0 /100 WBCS — SIGNIFICANT CHANGE UP (ref 0–0)
NRBC # FLD: 0 K/UL — SIGNIFICANT CHANGE UP (ref 0–0)
PHOSPHATE SERPL-MCNC: 3.4 MG/DL — SIGNIFICANT CHANGE UP (ref 2.5–4.5)
PLATELET # BLD AUTO: 221 K/UL — SIGNIFICANT CHANGE UP (ref 150–400)
POTASSIUM SERPL-MCNC: 3.7 MMOL/L — SIGNIFICANT CHANGE UP (ref 3.5–5.3)
POTASSIUM SERPL-SCNC: 3.7 MMOL/L — SIGNIFICANT CHANGE UP (ref 3.5–5.3)
RBC # BLD: 5.41 M/UL — HIGH (ref 3.8–5.2)
RBC # FLD: 13 % — SIGNIFICANT CHANGE UP (ref 10.3–14.5)
SARS-COV-2 RNA SPEC QL NAA+PROBE: SIGNIFICANT CHANGE UP
SODIUM SERPL-SCNC: 142 MMOL/L — SIGNIFICANT CHANGE UP (ref 135–145)
WBC # BLD: 6.17 K/UL — SIGNIFICANT CHANGE UP (ref 3.8–10.5)
WBC # FLD AUTO: 6.17 K/UL — SIGNIFICANT CHANGE UP (ref 3.8–10.5)

## 2022-09-27 PROCEDURE — 99232 SBSQ HOSP IP/OBS MODERATE 35: CPT

## 2022-09-27 RX ORDER — HALOPERIDOL DECANOATE 100 MG/ML
0.5 INJECTION INTRAMUSCULAR ONCE
Refills: 0 | Status: COMPLETED | OUTPATIENT
Start: 2022-09-27 | End: 2022-09-27

## 2022-09-27 RX ADMIN — HEPARIN SODIUM 5000 UNIT(S): 5000 INJECTION INTRAVENOUS; SUBCUTANEOUS at 06:18

## 2022-09-27 RX ADMIN — ATORVASTATIN CALCIUM 40 MILLIGRAM(S): 80 TABLET, FILM COATED ORAL at 21:38

## 2022-09-27 RX ADMIN — SENNA PLUS 2 TABLET(S): 8.6 TABLET ORAL at 21:36

## 2022-09-27 RX ADMIN — HEPARIN SODIUM 5000 UNIT(S): 5000 INJECTION INTRAVENOUS; SUBCUTANEOUS at 16:56

## 2022-09-27 RX ADMIN — HALOPERIDOL DECANOATE 0.5 MILLIGRAM(S): 100 INJECTION INTRAMUSCULAR at 22:57

## 2022-09-27 NOTE — CHART NOTE - NSCHARTNOTEFT_GEN_A_CORE
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      Nutrition course: Patient seen at bedside, appears confused. Collateral information obtained via PCA. Per PCA, patient consumed 50% of her breakfast this morning. Ensure Enlive supplement visibly seen untouched this morning. Recommend d/c Ensure Enlive 3x daily (1050 wallace and 60 gm protein). Will try Hormel Shake 2x daily(1040 wallace, 44gm pro) and Magic Cup 1x daily (290kcal, 9gm pro) for trial.  No chewing and swallowing difficulties reported at this time. Denies any GI issues (nausea/vomiting/diarrhea/constipation.) Per RN flowsheet pt noted with fecal incontinence.        Diet, Regular:   Pureed (PUREED)  Kosher  Supplement Feeding Modality:  Oral  Ensure Enlive Cans or Servings Per Day:  1       Frequency:  Three Times a day (09-26-22 @ 15:11)      GI: WDL. Last BM     PO intake:  < 50% [ ] 50-75% [ ]   % [ ]  other :    Enteral /Parenteral Nutrition:     Anthropometrics: Height (cm): 162.6 (09-24)  Weight (kg): 54.431 (09-24)  BMI (kg/m2): 20.6 (09-24)    Edema:  Pressure Injuries:    __________________ Pertinent Medications__________________   MEDICATIONS  (STANDING):  atorvastatin 40 milliGRAM(s) Oral at bedtime  heparin   Injectable 5000 Unit(s) SubCutaneous every 12 hours  pantoprazole    Tablet 40 milliGRAM(s) Oral before breakfast  senna 2 Tablet(s) Oral at bedtime    MEDICATIONS  (PRN):  acetaminophen     Tablet .. 650 milliGRAM(s) Oral every 6 hours PRN Temp greater or equal to 38C (100.4F), Mild Pain (1 - 3)      __________________ Pertinent Labs__________________   09-27 Na142 mmol/L Glu 120 mg/dL<H> K+ 3.7 mmol/L Cr  0.50 mg/dL BUN 41 mg/dL<H> 09-27 Phos 3.4 mg/dL 09-21 Alb 4.5 g/dL 09-26 Chol 218 mg/dL<H> LDL --    HDL 60 mg/dL Trig 149 mg/dL                Estimated Needs:   wallace/d  gm pro/d    [ ] no change since previous assessment  [ ] recalculated:       Previous Nutrition Diagnosis:     Nutrition Diagnosis is [ ] ongoing  [ ] resolved [ ] not applicable     New Nutrition Diagnosis:      Recommendations:        Monitoring and Evaluation:      [ x] Tolerance to diet prescription [x ] weights [x ] follow up per protocol  [ ] other: Source: PCA and comprehensive chart review    Medical course: 82F with PMHx of Arthritis, Hyperlipidemia and NPH (dx May 2022) who's admitted for AMS/encephalopathy in the setting of NPH.      Nutrition course: Patient seen at bedside, appears confused. Collateral information obtained via PCA. Per PCA, patient consumed 50% of her breakfast this morning. Patient required assistance with feeding per PCA.  Ensure Enlive supplement visibly seen untouched this morning. Recommend d/c Ensure Enlive 3x daily (1050 wallace and 60 gm protein). Will try Hormel Shake 2x daily(1040 wallace, 44gm pro) and Magic Cup 1x daily (290kcal, 9gm pro) for trial.  No chewing and swallowing difficulties reported at this time. Denies any GI issues (nausea/vomiting/diarrhea/constipation.) Per RN flowsheet pt noted with fecal incontinence. Patient continues on bowel regimen. Labs notable with replenished K, Na, and Phos. RD to remain available for further nutritional interventions as indicated.      Diet, Regular:   Pureed (PUREED)  Kosher  Supplement Feeding Modality:  Oral  Ensure Enlive Cans or Servings Per Day:  1       Frequency:  Three Times a day (09-26-22 @ 15:11)      GI: WDL. Last BM Yesterday     PO intake:  < 50% [x ]      Anthropometrics: Height (cm): 162.6 (09-24)  Weight (kg): 54.431 (09-24)  BMI (kg/m2): 20.6 (09-24)    Edema: None reported at present.   Pressure Injuries: None reported at present.     __________________ Pertinent Medications__________________   MEDICATIONS  (STANDING):  atorvastatin 40 milliGRAM(s) Oral at bedtime  heparin   Injectable 5000 Unit(s) SubCutaneous every 12 hours  pantoprazole    Tablet 40 milliGRAM(s) Oral before breakfast  senna 2 Tablet(s) Oral at bedtime    MEDICATIONS  (PRN):  acetaminophen     Tablet .. 650 milliGRAM(s) Oral every 6 hours PRN Temp greater or equal to 38C (100.4F), Mild Pain (1 - 3)      __________________ Pertinent Labs__________________   09-27 Na142 mmol/L Glu 120 mg/dL<H> K+ 3.7 mmol/L Cr  0.50 mg/dL BUN 41 mg/dL<H> 09-27 Phos 3.4 mg/dL 09-21 Alb 4.5 g/dL 09-26 Chol 218 mg/dL<H> LDL --    HDL 60 mg/dL Trig 149 mg/dL                Estimated Needs:   wallace/d  gm pro/d    [ ] no change since previous assessment  [ ] recalculated:       Previous Nutrition Diagnosis:     Nutrition Diagnosis is [ ] ongoing  [ ] resolved [ ] not applicable     New Nutrition Diagnosis:      Recommendations:        Monitoring and Evaluation:      [ x] Tolerance to diet prescription [x ] weights [x ] follow up per protocol  [ ] other: Source: PCA and comprehensive chart review    Medical course: 82F with PMHx of Arthritis, Hyperlipidemia and NPH (dx May 2022) who's admitted for AMS/encephalopathy in the setting of NPH.      Nutrition course: Patient seen at bedside, appears confused. Collateral information obtained via PCA. Per PCA, patient consumed 50% of her breakfast this morning. Patient required assistance with feeding per PCA.  Ensure Enlive supplement visibly seen untouched this morning. Recommend d/c Ensure Enlive 3x daily (1050 wallace and 60 gm protein). Will try Hormel Shake 2x daily(1040 wallace, 44gm pro) and Magic Cup 1x daily (290kcal, 9gm pro) for trial.  No chewing and swallowing difficulties reported at this time. Denies any GI issues (nausea/vomiting/diarrhea/constipation.) Per RN flowsheet pt noted with fecal incontinence. Patient continues on bowel regimen. Labs notable with replenished K, Na, and Phos. RD to remain available for further nutritional interventions as indicated.      Diet, Regular:   Pureed (PUREED)  Kosher  Supplement Feeding Modality:  Oral  Ensure Enlive Cans or Servings Per Day:  1       Frequency:  Three Times a day (09-26-22 @ 15:11)      GI: WDL. Last BM Yesterday     PO intake:  < 50% [x ]      Anthropometrics: Height (cm): 162.6 (09-24)  Weight (kg): 54.431 (09-24)  BMI (kg/m2): 20.6 (09-24)    Edema: None reported at present.   Pressure Injuries: None reported at present.     __________________ Pertinent Medications__________________   MEDICATIONS  (STANDING):  atorvastatin 40 milliGRAM(s) Oral at bedtime  heparin   Injectable 5000 Unit(s) SubCutaneous every 12 hours  pantoprazole    Tablet 40 milliGRAM(s) Oral before breakfast  senna 2 Tablet(s) Oral at bedtime    MEDICATIONS  (PRN):  acetaminophen     Tablet .. 650 milliGRAM(s) Oral every 6 hours PRN Temp greater or equal to 38C (100.4F), Mild Pain (1 - 3)      __________________ Pertinent Labs__________________   09-27 Na142 mmol/L Glu 120 mg/dL<H> K+ 3.7 mmol/L Cr  0.50 mg/dL BUN 41 mg/dL<H> 09-27 Phos 3.4 mg/dL 09-21 Alb 4.5 g/dL 09-26 Chol 218 mg/dL<H> LDL --    HDL 60 mg/dL Trig 149 mg/dL      Estimated Needs:   [ x] no change since previous assessment      Previous Nutrition Diagnosis: Malnutrition     Nutrition Diagnosis is [x ] ongoing      Recommendations:  1) Recommend d/c Ensure Enlive 3x daily (1050 wallace and 60 gm protein). Will provide Magic Cup 1x daily (290kcal, 9gm pro) and Hormel Shake 2x daily(1040 wallace, 44gm pro) for trial.   2) Encourage PO intake and honor food preferences w/in diet restrictions as able.  3) If w/in GOC, consider alternative means of nutrition if patient continues with poor PO intake.  4) RD to remain available for further nutritional interventions as indicated.           Monitoring and Evaluation:      [ x] Tolerance to diet prescription [x ] weights [x ] follow up per protocol  [ ] other:

## 2022-09-27 NOTE — PROGRESS NOTE ADULT - SUBJECTIVE AND OBJECTIVE BOX
Patient is a 82y old  Female who presents with a chief complaint of AMS (26 Sep 2022 09:47)      SUBJECTIVE / OVERNIGHT EVENTS:    MEDICATIONS  (STANDING):  atorvastatin 40 milliGRAM(s) Oral at bedtime  heparin   Injectable 5000 Unit(s) SubCutaneous every 12 hours  pantoprazole    Tablet 40 milliGRAM(s) Oral before breakfast  senna 2 Tablet(s) Oral at bedtime    MEDICATIONS  (PRN):  acetaminophen     Tablet .. 650 milliGRAM(s) Oral every 6 hours PRN Temp greater or equal to 38C (100.4F), Mild Pain (1 - 3)      Vital Signs Last 24 Hrs  T(C): 36.8 (27 Sep 2022 06:15), Max: 36.8 (26 Sep 2022 14:34)  T(F): 98.3 (27 Sep 2022 06:15), Max: 98.3 (27 Sep 2022 06:15)  HR: 82 (27 Sep 2022 06:15) (82 - 95)  BP: 143/87 (27 Sep 2022 06:15) (106/66 - 143/87)  BP(mean): --  RR: 17 (27 Sep 2022 06:15) (17 - 18)  SpO2: 97% (27 Sep 2022 06:15) (96% - 97%)    Parameters below as of 27 Sep 2022 06:15  Patient On (Oxygen Delivery Method): room air      CAPILLARY BLOOD GLUCOSE        I&O's Summary      PHYSICAL EXAM:  GENERAL: NAD, well-developed  HEAD:  Atraumatic, Normocephalic  EYES: EOMI, PERRLA, conjunctiva and sclera clear  NECK: Supple, No JVD  CHEST/LUNG: Clear to auscultation bilaterally; No wheeze  HEART: Regular rate and rhythm; No murmurs, rubs, or gallops  ABDOMEN: Soft, Nontender, Nondistended; Bowel sounds present  EXTREMITIES:  2+ Peripheral Pulses, No clubbing, cyanosis, or edema  PSYCH: AAOx3  NEUROLOGY: non-focal  SKIN: No rashes or lesions    LABS:                        16.2   6.17  )-----------( 221      ( 27 Sep 2022 05:53 )             46.6     09-27    142  |  106  |  41<H>  ----------------------------<  120<H>  3.7   |  24  |  0.50    Ca    9.0      27 Sep 2022 05:53  Phos  3.4     09-27  Mg     2.10     09-27                RADIOLOGY & ADDITIONAL TESTS:    Imaging Personally Reviewed:    Consultant(s) Notes Reviewed:      Care Discussed with Consultants/Other Providers:   Patient is a 82y old  Female who presents with a chief complaint of AMS (26 Sep 2022 09:47)      SUBJECTIVE / OVERNIGHT EVENTS: patient seen and examined by bedside, pt denies headache, dizziness, SOB, CP, Palpitations , N/V/D, abdominal pain, answering question appropriately , ate most of the breakfast as per PCA,   Pt not able to able state her location and the year correctly, able to state the month correctly     MEDICATIONS  (STANDING):  atorvastatin 40 milliGRAM(s) Oral at bedtime  heparin   Injectable 5000 Unit(s) SubCutaneous every 12 hours  pantoprazole    Tablet 40 milliGRAM(s) Oral before breakfast  senna 2 Tablet(s) Oral at bedtime    MEDICATIONS  (PRN):  acetaminophen     Tablet .. 650 milliGRAM(s) Oral every 6 hours PRN Temp greater or equal to 38C (100.4F), Mild Pain (1 - 3)      Vital Signs Last 24 Hrs  T(C): 36.8 (27 Sep 2022 06:15), Max: 36.8 (26 Sep 2022 14:34)  T(F): 98.3 (27 Sep 2022 06:15), Max: 98.3 (27 Sep 2022 06:15)  HR: 82 (27 Sep 2022 06:15) (82 - 95)  BP: 143/87 (27 Sep 2022 06:15) (106/66 - 143/87)  BP(mean): --  RR: 17 (27 Sep 2022 06:15) (17 - 18)  SpO2: 97% (27 Sep 2022 06:15) (96% - 97%)    Parameters below as of 27 Sep 2022 06:15  Patient On (Oxygen Delivery Method): room air      CAPILLARY BLOOD GLUCOSE        I&O's Summary      PHYSICAL EXAM:  CONSTITUTIONAL: NAD, appears comfortable  EYES: PERRLA; conjunctiva and sclera clear  ENMT: Moist oral mucosa; normal dentition  RESPIRATORY: Normal respiratory effort; grossly b/l AE  CARDIOVASCULAR: Regular rate and rhythm; No lower extremity edema;  ABDOMEN: Nontender to palpation, normoactive bowel sounds  MUSCULOSKELETAL:  no clubbing or cyanosis of digits; no joint swelling or tenderness to palpation  PSYCH: calm , answering questions appropriately   NEUROLOGY: moving all extremities   SKIN: No rashes; no palpable lesions        LABS:                        16.2   6.17  )-----------( 221      ( 27 Sep 2022 05:53 )             46.6     09-27    142  |  106  |  41<H>  ----------------------------<  120<H>  3.7   |  24  |  0.50    Ca    9.0      27 Sep 2022 05:53  Phos  3.4     09-27  Mg     2.10     09-27                RADIOLOGY & ADDITIONAL TESTS:    Imaging Personally Reviewed:    Consultant(s) Notes Reviewed:      Care Discussed with Consultants/Other Providers:

## 2022-09-27 NOTE — PROGRESS NOTE ADULT - PROBLEM SELECTOR PLAN 1
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 mental status improving   S/p ativan/haldol in ER due to agitation likely contributed to lethargy   - Monitor MS closely, per neurology if mental status and or functional status does not improve 9/26 plan for LP   - No need for EEG at this time, suspicion for seizure is low  - Psych consulted for assistance w/ agitation and depression: Would hold off on any medications in the setting of lethargy; If severely agitated can give Haldol 0.5mg PO/IV/IM q 6 hours PRN   - TSH is normal, B12 and folate normal, ammonia level normal  seems closer to baseline 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 mental status improving   S/p ativan/haldol in ER due to agitation likely contributed to lethargy   - Monitor MS closely, per neurology if mental status and or functional status does not improve ,  plan for LP   - No need for EEG at this time, suspicion for seizure is low  - Psych consulted for assistance w/ agitation and depression: Would hold off on any medications in the setting of lethargy; If severely agitated can give Haldol 0.5mg PO/IV/IM q 6 hours PRN   - TSH is normal, B12 and folate normal, ammonia level normal  seems closer to baseline

## 2022-09-27 NOTE — PROGRESS NOTE ADULT - PROBLEM SELECTOR PLAN 5
Tylenol as needed  PT/OT evaluations now that patient more awake Tylenol as needed  PT/OT evaluations - recommend rehab

## 2022-09-27 NOTE — PROGRESS NOTE ADULT - PROBLEM SELECTOR PLAN 4
Pt takes vesicare for overactive bladder now with urinary retention  No e/o UTI on UA  - holman removed, s/p TOV   -freq toileting to assist with void

## 2022-09-27 NOTE — PROGRESS NOTE ADULT - PROBLEM SELECTOR PLAN 7
Heparin SQ  PT/OT/S&S eval now more awake.       ELOY completed DNR/DNI Heparin SQ   will repeat S&S eval as  now more awake. Pt tolerating diet, nutrition f/u appreciated   encouragement and assistance with feeding        MOLST completed DNR/DNI

## 2022-09-28 LAB
ANION GAP SERPL CALC-SCNC: 13 MMOL/L — SIGNIFICANT CHANGE UP (ref 7–14)
BASOPHILS # BLD AUTO: 0.03 K/UL — SIGNIFICANT CHANGE UP (ref 0–0.2)
BASOPHILS NFR BLD AUTO: 0.4 % — SIGNIFICANT CHANGE UP (ref 0–2)
BUN SERPL-MCNC: 25 MG/DL — HIGH (ref 7–23)
CALCIUM SERPL-MCNC: 9.3 MG/DL — SIGNIFICANT CHANGE UP (ref 8.4–10.5)
CHLORIDE SERPL-SCNC: 104 MMOL/L — SIGNIFICANT CHANGE UP (ref 98–107)
CO2 SERPL-SCNC: 20 MMOL/L — LOW (ref 22–31)
CREAT SERPL-MCNC: 0.42 MG/DL — LOW (ref 0.5–1.3)
EGFR: 96 ML/MIN/1.73M2 — SIGNIFICANT CHANGE UP
EOSINOPHIL # BLD AUTO: 0.14 K/UL — SIGNIFICANT CHANGE UP (ref 0–0.5)
EOSINOPHIL NFR BLD AUTO: 2 % — SIGNIFICANT CHANGE UP (ref 0–6)
GLUCOSE SERPL-MCNC: 111 MG/DL — HIGH (ref 70–99)
HCT VFR BLD CALC: 52.1 % — HIGH (ref 34.5–45)
HGB BLD-MCNC: 17.3 G/DL — HIGH (ref 11.5–15.5)
IANC: 3.81 K/UL — SIGNIFICANT CHANGE UP (ref 1.8–7.4)
IMM GRANULOCYTES NFR BLD AUTO: 0.4 % — SIGNIFICANT CHANGE UP (ref 0–0.9)
LYMPHOCYTES # BLD AUTO: 2.34 K/UL — SIGNIFICANT CHANGE UP (ref 1–3.3)
LYMPHOCYTES # BLD AUTO: 33.2 % — SIGNIFICANT CHANGE UP (ref 13–44)
MAGNESIUM SERPL-MCNC: 2.2 MG/DL — SIGNIFICANT CHANGE UP (ref 1.6–2.6)
MCHC RBC-ENTMCNC: 29.3 PG — SIGNIFICANT CHANGE UP (ref 27–34)
MCHC RBC-ENTMCNC: 33.2 GM/DL — SIGNIFICANT CHANGE UP (ref 32–36)
MCV RBC AUTO: 88.2 FL — SIGNIFICANT CHANGE UP (ref 80–100)
MONOCYTES # BLD AUTO: 0.7 K/UL — SIGNIFICANT CHANGE UP (ref 0–0.9)
MONOCYTES NFR BLD AUTO: 9.9 % — SIGNIFICANT CHANGE UP (ref 2–14)
NEUTROPHILS # BLD AUTO: 3.81 K/UL — SIGNIFICANT CHANGE UP (ref 1.8–7.4)
NEUTROPHILS NFR BLD AUTO: 54.1 % — SIGNIFICANT CHANGE UP (ref 43–77)
NRBC # BLD: 0 /100 WBCS — SIGNIFICANT CHANGE UP (ref 0–0)
NRBC # FLD: 0 K/UL — SIGNIFICANT CHANGE UP (ref 0–0)
PHOSPHATE SERPL-MCNC: 4.1 MG/DL — SIGNIFICANT CHANGE UP (ref 2.5–4.5)
PLATELET # BLD AUTO: 213 K/UL — SIGNIFICANT CHANGE UP (ref 150–400)
POTASSIUM SERPL-MCNC: SIGNIFICANT CHANGE UP MMOL/L (ref 3.5–5.3)
POTASSIUM SERPL-SCNC: SIGNIFICANT CHANGE UP MMOL/L (ref 3.5–5.3)
RBC # BLD: 5.91 M/UL — HIGH (ref 3.8–5.2)
RBC # FLD: 12.5 % — SIGNIFICANT CHANGE UP (ref 10.3–14.5)
SODIUM SERPL-SCNC: 137 MMOL/L — SIGNIFICANT CHANGE UP (ref 135–145)
VIT B1 SERPL-MCNC: 208.5 NMOL/L — HIGH (ref 66.5–200)
WBC # BLD: 7.05 K/UL — SIGNIFICANT CHANGE UP (ref 3.8–10.5)
WBC # FLD AUTO: 7.05 K/UL — SIGNIFICANT CHANGE UP (ref 3.8–10.5)

## 2022-09-28 PROCEDURE — 99233 SBSQ HOSP IP/OBS HIGH 50: CPT

## 2022-09-28 RX ORDER — HALOPERIDOL DECANOATE 100 MG/ML
0.5 INJECTION INTRAMUSCULAR ONCE
Refills: 0 | Status: COMPLETED | OUTPATIENT
Start: 2022-09-28 | End: 2022-09-28

## 2022-09-28 RX ADMIN — HEPARIN SODIUM 5000 UNIT(S): 5000 INJECTION INTRAVENOUS; SUBCUTANEOUS at 06:15

## 2022-09-28 RX ADMIN — HEPARIN SODIUM 5000 UNIT(S): 5000 INJECTION INTRAVENOUS; SUBCUTANEOUS at 17:18

## 2022-09-28 RX ADMIN — SENNA PLUS 2 TABLET(S): 8.6 TABLET ORAL at 21:38

## 2022-09-28 RX ADMIN — HALOPERIDOL DECANOATE 0.5 MILLIGRAM(S): 100 INJECTION INTRAMUSCULAR at 06:50

## 2022-09-28 RX ADMIN — ATORVASTATIN CALCIUM 40 MILLIGRAM(S): 80 TABLET, FILM COATED ORAL at 21:38

## 2022-09-28 NOTE — PROGRESS NOTE ADULT - PROBLEM SELECTOR PLAN 1
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 mental status improving   S/p ativan/haldol in ER due to agitation likely contributed to lethargy   - Monitor MS closely, per neurology if mental status and or functional status does not improve ,  plan for LP   - No need for EEG at this time, suspicion for seizure is low  - Psych consulted for assistance w/ agitation and depression: Would hold off on any medications in the setting of lethargy; If severely agitated can give Haldol 0.5mg PO/IV/IM q 6 hours PRN   - TSH is normal, B12 and folate normal, ammonia level normal  seems closer to baseline 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 mental status improving   S/p ativan/haldol in ER due to agitation likely contributed to lethargy   - AS mental status and functional status has not improved , will   plan for LP   - No need for EEG at this time, suspicion for seizure is low  - Psych consulted for assistance w/ agitation and depression: Would hold off on any medications in the setting of lethargy; If severely agitated can give Haldol 0.5mg PO/IV/IM q 6 hours PRN   - TSH is normal, B12 and folate normal, ammonia level normal  seems closer to baseline

## 2022-09-28 NOTE — CHART NOTE - NSCHARTNOTEFT_GEN_A_CORE
Requested neurology follow up today given pt without improvement in mental status.   Tentative plan for LP tomorrow AM by procedure team as per discussion with neurology and procedure team.     Elenita Lebron PA-C  Department of Medicine   In-house pager #[phone]

## 2022-09-28 NOTE — PROGRESS NOTE ADULT - SUBJECTIVE AND OBJECTIVE BOX
Patient is a 82y old  Female who presents with a chief complaint of AMS (27 Sep 2022 09:41)      SUBJECTIVE / OVERNIGHT EVENTS:    MEDICATIONS  (STANDING):  atorvastatin 40 milliGRAM(s) Oral at bedtime  heparin   Injectable 5000 Unit(s) SubCutaneous every 12 hours  pantoprazole    Tablet 40 milliGRAM(s) Oral before breakfast  senna 2 Tablet(s) Oral at bedtime    MEDICATIONS  (PRN):  acetaminophen     Tablet .. 650 milliGRAM(s) Oral every 6 hours PRN Temp greater or equal to 38C (100.4F), Mild Pain (1 - 3)      Vital Signs Last 24 Hrs  T(C): 36.6 (28 Sep 2022 04:50), Max: 36.6 (28 Sep 2022 04:50)  T(F): 97.8 (28 Sep 2022 04:50), Max: 97.8 (28 Sep 2022 04:50)  HR: 96 (28 Sep 2022 04:50) (75 - 96)  BP: 104/66 (28 Sep 2022 04:50) (104/66 - 134/80)  BP(mean): --  RR: 18 (28 Sep 2022 04:50) (18 - 18)  SpO2: 99% (28 Sep 2022 04:50) (98% - 99%)    Parameters below as of 27 Sep 2022 21:08  Patient On (Oxygen Delivery Method): room air      CAPILLARY BLOOD GLUCOSE        I&O's Summary      PHYSICAL EXAM:  GENERAL: NAD, well-developed  HEAD:  Atraumatic, Normocephalic  EYES: EOMI, PERRLA, conjunctiva and sclera clear  NECK: Supple, No JVD  CHEST/LUNG: Clear to auscultation bilaterally; No wheeze  HEART: Regular rate and rhythm; No murmurs, rubs, or gallops  ABDOMEN: Soft, Nontender, Nondistended; Bowel sounds present  EXTREMITIES:  2+ Peripheral Pulses, No clubbing, cyanosis, or edema  PSYCH: AAOx3  NEUROLOGY: non-focal  SKIN: No rashes or lesions    LABS:                        17.3   7.05  )-----------( 213      ( 28 Sep 2022 05:00 )             52.1     09-28    137  |  104  |  25<H>  ----------------------------<  111<H>  TNP   |  20<L>  |  0.42<L>    Ca    9.3      28 Sep 2022 05:00  Phos  4.1     09-28  Mg     2.20     09-28                RADIOLOGY & ADDITIONAL TESTS:    Imaging Personally Reviewed:    Consultant(s) Notes Reviewed:      Care Discussed with Consultants/Other Providers:   Patient is a 82y old  Female who presents with a chief complaint of AMS (27 Sep 2022 09:41)      SUBJECTIVE / OVERNIGHT EVENTS: patient seen and examined by bedside, pt sleeping but answering question on calling name , pt hard of hearing, ate breakfast as per PCA, pt was talking to the PCA this morning but  thought of the PCA as her grand daughter   Pt required Haldol overnight for agitation   As per grandson, pt not at her baseline     MEDICATIONS  (STANDING):  atorvastatin 40 milliGRAM(s) Oral at bedtime  heparin   Injectable 5000 Unit(s) SubCutaneous every 12 hours  pantoprazole    Tablet 40 milliGRAM(s) Oral before breakfast  senna 2 Tablet(s) Oral at bedtime    MEDICATIONS  (PRN):  acetaminophen     Tablet .. 650 milliGRAM(s) Oral every 6 hours PRN Temp greater or equal to 38C (100.4F), Mild Pain (1 - 3)      Vital Signs Last 24 Hrs  T(C): 36.6 (28 Sep 2022 04:50), Max: 36.6 (28 Sep 2022 04:50)  T(F): 97.8 (28 Sep 2022 04:50), Max: 97.8 (28 Sep 2022 04:50)  HR: 96 (28 Sep 2022 04:50) (75 - 96)  BP: 104/66 (28 Sep 2022 04:50) (104/66 - 134/80)  BP(mean): --  RR: 18 (28 Sep 2022 04:50) (18 - 18)  SpO2: 99% (28 Sep 2022 04:50) (98% - 99%)    Parameters below as of 27 Sep 2022 21:08  Patient On (Oxygen Delivery Method): room air        PHYSICAL EXAM:  CONSTITUTIONAL: NAD, appears comfortable  EYES: PERRLA; conjunctiva and sclera clear  ENMT: Moist oral mucosa; normal dentition  RESPIRATORY: Normal respiratory effort; grossly b/l AE  CARDIOVASCULAR: Regular rate and rhythm; No lower extremity edema;  ABDOMEN: Nontender to palpation, normoactive bowel sounds  MUSCULOSKELETAL:  no clubbing or cyanosis of digits; no joint swelling or tenderness to palpation  PSYCH: calm , answering questions appropriately   NEUROLOGY: moving all extremities   SKIN: No rashes; no palpable lesions          LABS:                        17.3   7.05  )-----------( 213      ( 28 Sep 2022 05:00 )             52.1     09-28    137  |  104  |  25<H>  ----------------------------<  111<H>  TNP   |  20<L>  |  0.42<L>    Ca    9.3      28 Sep 2022 05:00  Phos  4.1     09-28  Mg     2.20     09-28                RADIOLOGY & ADDITIONAL TESTS:    Imaging Personally Reviewed:    Consultant(s) Notes Reviewed:      Care Discussed with Consultants/Other Providers:

## 2022-09-28 NOTE — PROGRESS NOTE ADULT - ASSESSMENT
Assessment: 83 yo female with a PMHx of R femoral neck fracture (s/p R hip hemiarthroplasty), HLD, and concern for NPH who presented to [facility] on 9/21 for AMS. Son reported patient was lethargic for 2 days PTA. He also reported she has been having progressive decline with recurrent falls over last several months. At baseline, patient ambulates with a walker and is oriented x2-3. CT head no acute findings.     Impression: AMS possibly due to toxic metabolic cause vs NPH. Mental status has not improved since admission, primary team pursuing LP.    Recommendations:  [] Neuro checks per routine.  [] NPH workup is typically done outpatient as it requires specific timing of PT evaluation. If the team moves forward with a large volume tap while the patient is in the hospital, then PT will need to see the patient prior to the LP to do an NPH-specific assessment (including "Get Up and Go" testing) and then again shortly after the LP is done to repeat the evaluation. CSF reaccumulates very quickly (in ~3 hours) after a large volume tap, so PT would need to reevaluate the patient immediately after the large volume tap. If these PT evaluations cannot be done, then the tap is neither therapeutic nor diagnostic.  [] If LP is pursued to r/o infectious or inflammatory causes of AMS, then would obtain opening pressure and send CSF glucose, protein, cell count, gram stain/culture, PCR viral panel, paraneoplastic panel, cytopathology, flow cytometry, MBP, LDH.  [x] Ammonia 17, B12 566, Folate 18.3, TSH 1.67.  [] PT/OT evals.  [] Patient should follow up with her private neurologist, Dr. Burton, after discharge for further workup/management of possible NPH.  [] Rest of care per primary team.    Case to be discussed with neurology attending Dr. Haines.

## 2022-09-28 NOTE — PROGRESS NOTE ADULT - PROBLEM SELECTOR PLAN 7
Heparin SQ   will repeat S&S eval as  now more awake. Pt tolerating diet, nutrition f/u appreciated   encouragement and assistance with feeding        MOLST completed DNR/DNI

## 2022-09-28 NOTE — PROGRESS NOTE ADULT - SUBJECTIVE AND OBJECTIVE BOX
MRN-[id number]    Subjective: 83 yo female seen and examined at bedside. No events overnight. No complaints.    PAST MEDICAL & SURGICAL HISTORY:  Hearing loss  bilateral -- wears aids    OA (osteoarthritis)  first carpometacarpal joint of right hand    Overactive bladder    Fibroids  1990  hysterectomy    FAMILY HISTORY:  No pertinent family history in first degree relatives    Social Hx:  Nonsmoker, no drug or alcohol use    Home Medications:  atorvastatin 40 mg oral tablet: 1 tab(s) orally once a day (21 Sep 2022 17:51)  NexIUM: otc 1 tab daily (21 Sep 2022 17:51)  senna oral tablet: 2 tab(s) orally once a day (at bedtime)  discharge home with over the counter for constipation caused by pain meds  hold for loose BMs (21 Sep 2022 17:51)  VESIcare 5 mg oral tablet: 1 tab(s) orally once a day (21 Sep 2022 17:51)    MEDICATIONS  (STANDING):  atorvastatin 40 milliGRAM(s) Oral at bedtime  heparin   Injectable 5000 Unit(s) SubCutaneous every 12 hours  pantoprazole    Tablet 40 milliGRAM(s) Oral before breakfast  senna 2 Tablet(s) Oral at bedtime    MEDICATIONS  (PRN):  acetaminophen     Tablet .. 650 milliGRAM(s) Oral every 6 hours PRN Temp greater or equal to 38C (100.4F), Mild Pain (1 - 3)    Allergies  penicillin (Other)    ROS: Pertinent positives above, all other ROS were reviewed and are negative.      Vital Signs Last 24 Hrs  T(C): 36.6 (28 Sep 2022 12:39), Max: 36.6 (28 Sep 2022 04:50)  T(F): 97.9 (28 Sep 2022 12:39), Max: 97.9 (28 Sep 2022 12:39)  HR: 90 (28 Sep 2022 12:39) (85 - 96)  BP: 110/62 (28 Sep 2022 12:39) (104/66 - 131/83)  RR: 17 (28 Sep 2022 12:39) (17 - 18)  SpO2: 97% (28 Sep 2022 12:39) (97% - 99%)    Parameters below as of 28 Sep 2022 12:39  Patient On (Oxygen Delivery Method): room air    GENERAL EXAM:  Constitutional: Lying in bed, NAD.  Head: Normocephalic, atraumatic.  Extremities: No edema, no cyanosis.    NEUROLOGICAL EXAM:  MS: Eyes closed, do not open to verbal or noxious stimuli, however patient does respond to examiners questions and follows some commands. Minimal verbal output, only responds to direct questions with 1-3 word phrases. Shows two fingers and lifts both arms on command, but does not smile or lift legs on command.  CN: PERRL. Face grossly symmetric.  Motor: Upper extremities at least some effort against gravity, symmetric. Lower extremities withdraw against gravity to noxious stimuli.  Sensory: Grimaces to noxious stimuli x4.  Reflexes: 1+ throughout upper extremities. Absent Patellar reflex bilaterally. 2+ Achilles bilaterally. Babinski absent bilaterally.   Coordination/Gait: Unable to assess.    Labs:   cbc                      17.3   7.05  )-----------( 213      ( 28 Sep 2022 05:00 )             52.1     [id number]    137  |  104  |  25<H>  ----------------------------<  111<H>  TNP   |  20<L>  |  0.42<L>    Ca    9.3      28 Sep 2022 05:00  Phos  4.1     09-28  Mg     2.20     09-28    [id number] Chol 218<H> LDL -- HDL 60 Trig 149    Radiology:  -09/21 CTH: No acute intracranial hemorrhage or mass effect.

## 2022-09-29 ENCOUNTER — RESULT REVIEW (OUTPATIENT)
Age: 82
End: 2022-09-29

## 2022-09-29 LAB
ANION GAP SERPL CALC-SCNC: 9 MMOL/L — SIGNIFICANT CHANGE UP (ref 7–14)
APPEARANCE CSF: ABNORMAL
APPEARANCE SPUN FLD: ABNORMAL
APTT BLD: 36.3 SEC — SIGNIFICANT CHANGE UP (ref 27–36.3)
BACTERIAL AG PNL SER: 0 % — SIGNIFICANT CHANGE UP
BASOPHILS # BLD AUTO: 0.02 K/UL — SIGNIFICANT CHANGE UP (ref 0–0.2)
BASOPHILS NFR BLD AUTO: 0.3 % — SIGNIFICANT CHANGE UP (ref 0–2)
BUN SERPL-MCNC: 41 MG/DL — HIGH (ref 7–23)
CALCIUM SERPL-MCNC: 8.9 MG/DL — SIGNIFICANT CHANGE UP (ref 8.4–10.5)
CHLORIDE SERPL-SCNC: 103 MMOL/L — SIGNIFICANT CHANGE UP (ref 98–107)
CO2 SERPL-SCNC: 26 MMOL/L — SIGNIFICANT CHANGE UP (ref 22–31)
COLOR CSF: SIGNIFICANT CHANGE UP
CREAT SERPL-MCNC: 0.52 MG/DL — SIGNIFICANT CHANGE UP (ref 0.5–1.3)
CSF PCR RESULT: SIGNIFICANT CHANGE UP
EGFR: 92 ML/MIN/1.73M2 — SIGNIFICANT CHANGE UP
EOSINOPHIL # BLD AUTO: 0.15 K/UL — SIGNIFICANT CHANGE UP (ref 0–0.5)
EOSINOPHIL # CSF: 0 % — SIGNIFICANT CHANGE UP
EOSINOPHIL NFR BLD AUTO: 2.4 % — SIGNIFICANT CHANGE UP (ref 0–6)
GLUCOSE CSF-MCNC: 70 MG/DL — SIGNIFICANT CHANGE UP (ref 40–70)
GLUCOSE SERPL-MCNC: 126 MG/DL — HIGH (ref 70–99)
GRAM STN FLD: SIGNIFICANT CHANGE UP
HCT VFR BLD CALC: 47.5 % — HIGH (ref 34.5–45)
HGB BLD-MCNC: 16.3 G/DL — HIGH (ref 11.5–15.5)
IANC: 3.41 K/UL — SIGNIFICANT CHANGE UP (ref 1.8–7.4)
IMM GRANULOCYTES NFR BLD AUTO: 0.5 % — SIGNIFICANT CHANGE UP (ref 0–0.9)
INR BLD: 1.09 RATIO — SIGNIFICANT CHANGE UP (ref 0.88–1.16)
LDH CSF L TO P-CCNC: 20 U/L — SIGNIFICANT CHANGE UP
LDH FLD-CCNC: 20 U/L — SIGNIFICANT CHANGE UP
LYMPHOCYTES # BLD AUTO: 1.99 K/UL — SIGNIFICANT CHANGE UP (ref 1–3.3)
LYMPHOCYTES # BLD AUTO: 32.3 % — SIGNIFICANT CHANGE UP (ref 13–44)
LYMPHOCYTES # CSF: 75 % — SIGNIFICANT CHANGE UP
MAGNESIUM SERPL-MCNC: 2 MG/DL — SIGNIFICANT CHANGE UP (ref 1.6–2.6)
MCHC RBC-ENTMCNC: 30.4 PG — SIGNIFICANT CHANGE UP (ref 27–34)
MCHC RBC-ENTMCNC: 34.3 GM/DL — SIGNIFICANT CHANGE UP (ref 32–36)
MCV RBC AUTO: 88.5 FL — SIGNIFICANT CHANGE UP (ref 80–100)
MONOCYTES # BLD AUTO: 0.57 K/UL — SIGNIFICANT CHANGE UP (ref 0–0.9)
MONOCYTES NFR BLD AUTO: 9.2 % — SIGNIFICANT CHANGE UP (ref 2–14)
MONOS+MACROS NFR CSF: 0 % — SIGNIFICANT CHANGE UP
NEUTROPHILS # BLD AUTO: 3.41 K/UL — SIGNIFICANT CHANGE UP (ref 1.8–7.4)
NEUTROPHILS # CSF: 25 % — SIGNIFICANT CHANGE UP
NEUTROPHILS NFR BLD AUTO: 55.3 % — SIGNIFICANT CHANGE UP (ref 43–77)
NRBC # BLD: 0 /100 WBCS — SIGNIFICANT CHANGE UP (ref 0–0)
NRBC # FLD: 0 K/UL — SIGNIFICANT CHANGE UP (ref 0–0)
NRBC NFR CSF: 1 CELLS/UL — SIGNIFICANT CHANGE UP (ref 0–5)
OTHER CELLS CSF MANUAL: 0 % — SIGNIFICANT CHANGE UP
PHOSPHATE SERPL-MCNC: 4.2 MG/DL — SIGNIFICANT CHANGE UP (ref 2.5–4.5)
PLATELET # BLD AUTO: 202 K/UL — SIGNIFICANT CHANGE UP (ref 150–400)
POTASSIUM SERPL-MCNC: 3.5 MMOL/L — SIGNIFICANT CHANGE UP (ref 3.5–5.3)
POTASSIUM SERPL-SCNC: 3.5 MMOL/L — SIGNIFICANT CHANGE UP (ref 3.5–5.3)
PROT CSF-MCNC: 47 MG/DL — HIGH (ref 15–45)
PROTHROM AB SERPL-ACNC: 12.6 SEC — SIGNIFICANT CHANGE UP (ref 10.5–13.4)
RBC # BLD: 5.37 M/UL — HIGH (ref 3.8–5.2)
RBC # CSF: 2650 CELLS/UL — HIGH (ref 0–0)
RBC # FLD: 13.2 % — SIGNIFICANT CHANGE UP (ref 10.3–14.5)
SODIUM SERPL-SCNC: 138 MMOL/L — SIGNIFICANT CHANGE UP (ref 135–145)
SPECIMEN SOURCE: SIGNIFICANT CHANGE UP
TOTAL CELLS COUNTED, SPINAL FLUID: 4 CELLS — SIGNIFICANT CHANGE UP
TUBE TYPE: SIGNIFICANT CHANGE UP
WBC # BLD: 6.17 K/UL — SIGNIFICANT CHANGE UP (ref 3.8–10.5)
WBC # FLD AUTO: 6.17 K/UL — SIGNIFICANT CHANGE UP (ref 3.8–10.5)

## 2022-09-29 PROCEDURE — 99233 SBSQ HOSP IP/OBS HIGH 50: CPT

## 2022-09-29 PROCEDURE — 88108 CYTOPATH CONCENTRATE TECH: CPT | Mod: 26

## 2022-09-29 PROCEDURE — 88188 FLOWCYTOMETRY/READ 9-15: CPT

## 2022-09-29 RX ORDER — HEPARIN SODIUM 5000 [USP'U]/ML
5000 INJECTION INTRAVENOUS; SUBCUTANEOUS EVERY 12 HOURS
Refills: 0 | Status: DISCONTINUED | OUTPATIENT
Start: 2022-09-30 | End: 2022-10-07

## 2022-09-29 RX ORDER — SODIUM CHLORIDE 9 MG/ML
1000 INJECTION INTRAMUSCULAR; INTRAVENOUS; SUBCUTANEOUS
Refills: 0 | Status: DISCONTINUED | OUTPATIENT
Start: 2022-09-29 | End: 2022-10-03

## 2022-09-29 RX ADMIN — PANTOPRAZOLE SODIUM 40 MILLIGRAM(S): 20 TABLET, DELAYED RELEASE ORAL at 05:46

## 2022-09-29 RX ADMIN — SENNA PLUS 2 TABLET(S): 8.6 TABLET ORAL at 21:33

## 2022-09-29 RX ADMIN — SODIUM CHLORIDE 75 MILLILITER(S): 9 INJECTION INTRAMUSCULAR; INTRAVENOUS; SUBCUTANEOUS at 12:05

## 2022-09-29 RX ADMIN — ATORVASTATIN CALCIUM 40 MILLIGRAM(S): 80 TABLET, FILM COATED ORAL at 21:32

## 2022-09-29 NOTE — PROCEDURE NOTE - ADDITIONAL PROCEDURE DETAILS
A lumbar puncture was performed under direct supervison. The back was prepped and draped in sterile fashion. Landmarks were identified. (The area was infiltrated with 1% Lidocaine.)     A (22) gauge spinal needle was inserted between L4 and L5. Approximately (20) mL of (clear) CSF was obtained.     The needle was withdrawn and bandage applied. There were no complications and the patient tolerated the procedure well.    Plts and INR were confirmed WNL prior to procedure.

## 2022-09-29 NOTE — PROGRESS NOTE ADULT - PROBLEM SELECTOR PLAN 7
Heparin SQ   will repeat S&S eval as  now more awake. Pt tolerating diet, nutrition f/u appreciated   encouragement and assistance with feeding        MOLST completed DNR/DNI Heparin SQ-holding for LP, will ise SCDs for now    will repeat S&S eval as  now more awake. Pt tolerating diet, nutrition f/u appreciated   encouragement and assistance with feeding        MOLST completed DNR/DNI

## 2022-09-29 NOTE — PROGRESS NOTE ADULT - SUBJECTIVE AND OBJECTIVE BOX
Patient is a 82y old  Female who presents with a chief complaint of AMS (28 Sep 2022 15:59)      SUBJECTIVE / OVERNIGHT EVENTS:    MEDICATIONS  (STANDING):  atorvastatin 40 milliGRAM(s) Oral at bedtime  pantoprazole    Tablet 40 milliGRAM(s) Oral before breakfast  senna 2 Tablet(s) Oral at bedtime    MEDICATIONS  (PRN):  acetaminophen     Tablet .. 650 milliGRAM(s) Oral every 6 hours PRN Temp greater or equal to 38C (100.4F), Mild Pain (1 - 3)      Vital Signs Last 24 Hrs  T(C): 36.6 (29 Sep 2022 05:46), Max: 36.6 (28 Sep 2022 12:39)  T(F): 97.9 (29 Sep 2022 05:46), Max: 97.9 (28 Sep 2022 12:39)  HR: 83 (29 Sep 2022 05:46) (83 - 91)  BP: 136/80 (29 Sep 2022 05:46) (110/62 - 154/77)  BP(mean): --  RR: 18 (29 Sep 2022 05:46) (17 - 18)  SpO2: 98% (29 Sep 2022 05:46) (97% - 100%)    Parameters below as of 29 Sep 2022 05:46  Patient On (Oxygen Delivery Method): room air      CAPILLARY BLOOD GLUCOSE        I&O's Summary      PHYSICAL EXAM:  GENERAL: NAD, well-developed  HEAD:  Atraumatic, Normocephalic  EYES: EOMI, PERRLA, conjunctiva and sclera clear  NECK: Supple, No JVD  CHEST/LUNG: Clear to auscultation bilaterally; No wheeze  HEART: Regular rate and rhythm; No murmurs, rubs, or gallops  ABDOMEN: Soft, Nontender, Nondistended; Bowel sounds present  EXTREMITIES:  2+ Peripheral Pulses, No clubbing, cyanosis, or edema  PSYCH: AAOx3  NEUROLOGY: non-focal  SKIN: No rashes or lesions    LABS:                        17.3   7.05  )-----------( 213      ( 28 Sep 2022 05:00 )             52.1     09-29    138  |  103  |  41<H>  ----------------------------<  126<H>  3.5   |  26  |  0.52    Ca    8.9      29 Sep 2022 07:45  Phos  4.2     09-29  Mg     2.00     09-29      PT/INR - ( 29 Sep 2022 07:45 )   PT: 12.6 sec;   INR: 1.09 ratio         PTT - ( 29 Sep 2022 07:45 )  PTT:36.3 sec          RADIOLOGY & ADDITIONAL TESTS:    Imaging Personally Reviewed:    Consultant(s) Notes Reviewed:      Care Discussed with Consultants/Other Providers:   Patient is a 82y old  Female who presents with a chief complaint of AMS (28 Sep 2022 15:59)      SUBJECTIVE / OVERNIGHT EVENTS: patient seen and examined by bedside, pt with no acute events overnight ,scheduled for LP with Procedure team today     MEDICATIONS  (STANDING):  atorvastatin 40 milliGRAM(s) Oral at bedtime  pantoprazole    Tablet 40 milliGRAM(s) Oral before breakfast  senna 2 Tablet(s) Oral at bedtime    MEDICATIONS  (PRN):  acetaminophen     Tablet .. 650 milliGRAM(s) Oral every 6 hours PRN Temp greater or equal to 38C (100.4F), Mild Pain (1 - 3)      Vital Signs Last 24 Hrs  T(C): 36.6 (29 Sep 2022 05:46), Max: 36.6 (28 Sep 2022 12:39)  T(F): 97.9 (29 Sep 2022 05:46), Max: 97.9 (28 Sep 2022 12:39)  HR: 83 (29 Sep 2022 05:46) (83 - 91)  BP: 136/80 (29 Sep 2022 05:46) (110/62 - 154/77)  BP(mean): --  RR: 18 (29 Sep 2022 05:46) (17 - 18)  SpO2: 98% (29 Sep 2022 05:46) (97% - 100%)    Parameters below as of 29 Sep 2022 05:46  Patient On (Oxygen Delivery Method): room air      CAPILLARY BLOOD GLUCOSE        I&O's Summary    PHYSICAL EXAM:  CONSTITUTIONAL: NAD, appears comfortable  EYES: PERRLA; conjunctiva and sclera clear  ENMT: Moist oral mucosa; normal dentition  RESPIRATORY: Normal respiratory effort; grossly b/l AE  CARDIOVASCULAR: Regular rate and rhythm; No lower extremity edema;  ABDOMEN: Nontender to palpation, normoactive bowel sounds  MUSCULOSKELETAL:  no clubbing or cyanosis of digits; no joint swelling or tenderness to palpation  PSYCH: calm ,  NEUROLOGY: moving all extremities   SKIN: No rashes; no palpable lesions        LABS:                        17.3   7.05  )-----------( 213      ( 28 Sep 2022 05:00 )             52.1     09-29    138  |  103  |  41<H>  ----------------------------<  126<H>  3.5   |  26  |  0.52    Ca    8.9      29 Sep 2022 07:45  Phos  4.2     09-29  Mg     2.00     09-29      PT/INR - ( 29 Sep 2022 07:45 )   PT: 12.6 sec;   INR: 1.09 ratio         PTT - ( 29 Sep 2022 07:45 )  PTT:36.3 sec          RADIOLOGY & ADDITIONAL TESTS:    Imaging Personally Reviewed:    Consultant(s) Notes Reviewed:  neurology     Care Discussed with Consultants/Other Providers:

## 2022-09-29 NOTE — PROGRESS NOTE ADULT - PROBLEM SELECTOR PLAN 1
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 mental status improving   S/p ativan/haldol in ER due to agitation likely contributed to lethargy   - AS mental status and functional status has not improved , will   plan for LP   - No need for EEG at this time, suspicion for seizure is low  - Psych consulted for assistance w/ agitation and depression: Would hold off on any medications in the setting of lethargy; If severely agitated can give Haldol 0.5mg PO/IV/IM q 6 hours PRN   - TSH is normal, B12 and folate normal, ammonia level normal  seems closer to baseline 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 mental status improving   S/p ativan/haldol in ER due to agitation likely contributed to lethargy   - AS mental status and functional status has not improved ,   plan for LP today with procedure team, Neuro f/u appreciated , recommend PT will need to see the patient prior to the LP to do an NPH-specific assessment (including "Get Up and Go" testing) and then again shortly after the LP is done to repeat the evaluation. CSF reaccumulates very quickly (in ~3 hours) after a large volume tap, so PT would need to reevaluate the patient immediately after the large volume tap. If these PT evaluations cannot be done, then the tap is neither therapeutic nor diagnostic.  - If LP is pursued to r/o infectious or inflammatory causes of AMS, then Neuro recommend to obtain opening pressure and send CSF glucose, protein, cell count, gram stain/culture, PCR viral panel, paraneoplastic panel, cytopathology, flow cytometry, MBP, LDH.  - No need for EEG at this time, suspicion for seizure is low  - Psych consulted for assistance w/ agitation and depression: Would hold off on any medications in the setting of lethargy; If severely agitated can give Haldol 0.5mg PO/IV/IM q 6 hours PRN   - TSH is normal, B12 and folate normal, ammonia level normal  -PT notified about assessment before and after LP

## 2022-09-30 ENCOUNTER — TRANSCRIPTION ENCOUNTER (OUTPATIENT)
Age: 82
End: 2022-09-30

## 2022-09-30 LAB
ANION GAP SERPL CALC-SCNC: 11 MMOL/L — SIGNIFICANT CHANGE UP (ref 7–14)
BUN SERPL-MCNC: 30 MG/DL — HIGH (ref 7–23)
CALCIUM SERPL-MCNC: 8.8 MG/DL — SIGNIFICANT CHANGE UP (ref 8.4–10.5)
CHLORIDE SERPL-SCNC: 105 MMOL/L — SIGNIFICANT CHANGE UP (ref 98–107)
CO2 SERPL-SCNC: 26 MMOL/L — SIGNIFICANT CHANGE UP (ref 22–31)
CREAT SERPL-MCNC: 0.48 MG/DL — LOW (ref 0.5–1.3)
EGFR: 93 ML/MIN/1.73M2 — SIGNIFICANT CHANGE UP
GLUCOSE SERPL-MCNC: 101 MG/DL — HIGH (ref 70–99)
HCT VFR BLD CALC: 46.9 % — HIGH (ref 34.5–45)
HGB BLD-MCNC: 15.7 G/DL — HIGH (ref 11.5–15.5)
MAGNESIUM SERPL-MCNC: 2 MG/DL — SIGNIFICANT CHANGE UP (ref 1.6–2.6)
MCHC RBC-ENTMCNC: 29.8 PG — SIGNIFICANT CHANGE UP (ref 27–34)
MCHC RBC-ENTMCNC: 33.5 GM/DL — SIGNIFICANT CHANGE UP (ref 32–36)
MCV RBC AUTO: 89.2 FL — SIGNIFICANT CHANGE UP (ref 80–100)
NON-GYNECOLOGICAL CYTOLOGY STUDY: SIGNIFICANT CHANGE UP
NRBC # BLD: 0 /100 WBCS — SIGNIFICANT CHANGE UP (ref 0–0)
NRBC # FLD: 0 K/UL — SIGNIFICANT CHANGE UP (ref 0–0)
PHOSPHATE SERPL-MCNC: 3.1 MG/DL — SIGNIFICANT CHANGE UP (ref 2.5–4.5)
PLATELET # BLD AUTO: 197 K/UL — SIGNIFICANT CHANGE UP (ref 150–400)
POTASSIUM SERPL-MCNC: 3.9 MMOL/L — SIGNIFICANT CHANGE UP (ref 3.5–5.3)
POTASSIUM SERPL-SCNC: 3.9 MMOL/L — SIGNIFICANT CHANGE UP (ref 3.5–5.3)
RBC # BLD: 5.26 M/UL — HIGH (ref 3.8–5.2)
RBC # FLD: 12.7 % — SIGNIFICANT CHANGE UP (ref 10.3–14.5)
SODIUM SERPL-SCNC: 142 MMOL/L — SIGNIFICANT CHANGE UP (ref 135–145)
WBC # BLD: 5.82 K/UL — SIGNIFICANT CHANGE UP (ref 3.8–10.5)
WBC # FLD AUTO: 5.82 K/UL — SIGNIFICANT CHANGE UP (ref 3.8–10.5)

## 2022-09-30 PROCEDURE — 99233 SBSQ HOSP IP/OBS HIGH 50: CPT

## 2022-09-30 RX ORDER — DIAZEPAM 5 MG
5 TABLET ORAL ONCE
Refills: 0 | Status: DISCONTINUED | OUTPATIENT
Start: 2022-09-30 | End: 2022-10-02

## 2022-09-30 RX ADMIN — ATORVASTATIN CALCIUM 40 MILLIGRAM(S): 80 TABLET, FILM COATED ORAL at 21:17

## 2022-09-30 RX ADMIN — HEPARIN SODIUM 5000 UNIT(S): 5000 INJECTION INTRAVENOUS; SUBCUTANEOUS at 06:06

## 2022-09-30 RX ADMIN — SENNA PLUS 2 TABLET(S): 8.6 TABLET ORAL at 21:17

## 2022-09-30 RX ADMIN — HEPARIN SODIUM 5000 UNIT(S): 5000 INJECTION INTRAVENOUS; SUBCUTANEOUS at 17:05

## 2022-09-30 NOTE — PROGRESS NOTE ADULT - SUBJECTIVE AND OBJECTIVE BOX
Patient is a 82y old  Female who presents with a chief complaint of AMS (29 Sep 2022 09:27)      SUBJECTIVE / OVERNIGHT EVENTS:    MEDICATIONS  (STANDING):  atorvastatin 40 milliGRAM(s) Oral at bedtime  heparin   Injectable 5000 Unit(s) SubCutaneous every 12 hours  pantoprazole    Tablet 40 milliGRAM(s) Oral before breakfast  senna 2 Tablet(s) Oral at bedtime  sodium chloride 0.9%. 1000 milliLiter(s) (75 mL/Hr) IV Continuous <Continuous>    MEDICATIONS  (PRN):  acetaminophen     Tablet .. 650 milliGRAM(s) Oral every 6 hours PRN Temp greater or equal to 38C (100.4F), Mild Pain (1 - 3)      Vital Signs Last 24 Hrs  T(C): 36.5 (30 Sep 2022 04:14), Max: 36.7 (29 Sep 2022 13:00)  T(F): 97.7 (30 Sep 2022 04:14), Max: 98 (29 Sep 2022 13:00)  HR: 82 (30 Sep 2022 04:14) (80 - 91)  BP: 134/64 (30 Sep 2022 04:14) (107/67 - 134/64)  BP(mean): --  RR: 18 (30 Sep 2022 04:14) (17 - 18)  SpO2: 99% (30 Sep 2022 04:14) (98% - 99%)    Parameters below as of 30 Sep 2022 04:14  Patient On (Oxygen Delivery Method): room air      CAPILLARY BLOOD GLUCOSE        I&O's Summary      PHYSICAL EXAM:  GENERAL: NAD, well-developed  HEAD:  Atraumatic, Normocephalic  EYES: EOMI, PERRLA, conjunctiva and sclera clear  NECK: Supple, No JVD  CHEST/LUNG: Clear to auscultation bilaterally; No wheeze  HEART: Regular rate and rhythm; No murmurs, rubs, or gallops  ABDOMEN: Soft, Nontender, Nondistended; Bowel sounds present  EXTREMITIES:  2+ Peripheral Pulses, No clubbing, cyanosis, or edema  PSYCH: AAOx3  NEUROLOGY: non-focal  SKIN: No rashes or lesions    LABS:                        15.7   5.82  )-----------( 197      ( 30 Sep 2022 05:18 )             46.9     09-30    142  |  105  |  30<H>  ----------------------------<  101<H>  3.9   |  26  |  0.48<L>    Ca    8.8      30 Sep 2022 05:18  Phos  3.1     09-30  Mg     2.00     09-30      PT/INR - ( 29 Sep 2022 07:45 )   PT: 12.6 sec;   INR: 1.09 ratio         PTT - ( 29 Sep 2022 07:45 )  PTT:36.3 sec          RADIOLOGY & ADDITIONAL TESTS:    Imaging Personally Reviewed:    Consultant(s) Notes Reviewed:      Care Discussed with Consultants/Other Providers:   Patient is a 82y old  Female who presents with a chief complaint of AMS (29 Sep 2022 09:27)      SUBJECTIVE / OVERNIGHT EVENTS: patient seen and examined by bedside, pt awake , [place], denies headache, dizziness, SOB, CP, Palpitations , N/V/D, abdominal pain  but pt poor historian, thinks that she is at home not able to say month and year correctly   Ate  breakfast as per PCA      MEDICATIONS  (STANDING):  atorvastatin 40 milliGRAM(s) Oral at bedtime  heparin   Injectable 5000 Unit(s) SubCutaneous every 12 hours  pantoprazole    Tablet 40 milliGRAM(s) Oral before breakfast  senna 2 Tablet(s) Oral at bedtime  sodium chloride 0.9%. 1000 milliLiter(s) (75 mL/Hr) IV Continuous <Continuous>    MEDICATIONS  (PRN):  acetaminophen     Tablet .. 650 milliGRAM(s) Oral every 6 hours PRN Temp greater or equal to 38C (100.4F), Mild Pain (1 - 3)      Vital Signs Last 24 Hrs  T(C): 36.5 (30 Sep 2022 04:14), Max: 36.7 (29 Sep 2022 13:00)  T(F): 97.7 (30 Sep 2022 04:14), Max: 98 (29 Sep 2022 13:00)  HR: 82 (30 Sep 2022 04:14) (80 - 91)  BP: 134/64 (30 Sep 2022 04:14) (107/67 - 134/64)  BP(mean): --  RR: 18 (30 Sep 2022 04:14) (17 - 18)  SpO2: 99% (30 Sep 2022 04:14) (98% - 99%)    Parameters below as of 30 Sep 2022 04:14  Patient On (Oxygen Delivery Method): room air      CAPILLARY BLOOD GLUCOSE              PHYSICAL EXAM:  CONSTITUTIONAL: NAD, appears comfortable  EYES: PERRLA; conjunctiva and sclera clear  ENMT: Moist oral mucosa; normal dentition  RESPIRATORY: Normal respiratory effort; grossly b/l AE  CARDIOVASCULAR: Regular rate and rhythm; No lower extremity edema;  ABDOMEN: Nontender to palpation, normoactive bowel sounds  MUSCULOSKELETAL:  no clubbing or cyanosis of digits; no joint swelling or tenderness to palpation  PSYCH: calm ,  NEUROLOGY: moving all extremities , L CN VI palsy   SKIN: No rashes; no palpable lesions    LABS:                        15.7   5.82  )-----------( 197      ( 30 Sep 2022 05:18 )             46.9     09-30    142  |  105  |  30<H>  ----------------------------<  101<H>  3.9   |  26  |  0.48<L>    Ca    8.8      30 Sep 2022 05:18  Phos  3.1     09-30  Mg     2.00     09-30      PT/INR - ( 29 Sep 2022 07:45 )   PT: 12.6 sec;   INR: 1.09 ratio         PTT - ( 29 Sep 2022 07:45 )  PTT:36.3 sec          RADIOLOGY & ADDITIONAL TESTS:    Imaging Personally Reviewed:    Consultant(s) Notes Reviewed:  Neurology     Care Discussed with Consultants/Other Providers:

## 2022-09-30 NOTE — DISCHARGE NOTE PROVIDER - PROVIDER TOKENS
PROVIDER:[TOKEN:[[id number]]] PROVIDER:[TOKEN:[[id number]]],PROVIDER:[TOKEN:[[id number]]],PROVIDER:[TOKEN:[[id number]]] PROVIDER:[TOKEN:[[id number]]],PROVIDER:[TOKEN:[[id number]]] PROVIDER:[TOKEN:[[id number]]],PROVIDER:[TOKEN:[[id number]]],PROVIDER:[TOKEN:[[id number]]]

## 2022-09-30 NOTE — PROGRESS NOTE ADULT - PROBLEM SELECTOR PLAN 1
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 CSF reaccumulates very quickly (in ~3 hours) after a large volume tap, so PT would need to reevaluate the patient immediately after the large volume tap. If these PT evaluations cannot be done, then the tap is neither therapeutic nor diagnostic.  - If LP is pursued to r/o infectious or inflammatory causes of AMS, then Neuro recommend to obtain opening pressure and send CSF glucose, protein, cell count, gram stain/culture, PCR viral panel, paraneoplastic panel, cytopathology, flow cytometry, MBP, LDH.  - No need for EEG at this time, suspicion for seizure is low  - Psych consulted for assistance w/ agitation and depression: Would hold off on any medications in the setting of lethargy; If severely agitated can give Haldol 0.5mg PO/IV/IM q 6 hours PRN   - TSH is normal, B12 and folate normal, ammonia level normal  -PT notified about assessment before and after LP 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 mental status improving   S/p ativan/haldol in ER due to agitation likely contributed to lethargy   - AS mental status and functional status has not improved ,   LP performed ,  opening pressure 21. Xanthochromia present. CSF glucose 70, protein 47 <H>, TNC 1, RBC 2650, LDH 20. PCR panel neg. Gram stain neg. Cx NGTD.  Neuro f/u appreciated, recommend  f/u remaining CSF studies: MBP, Paraneoplastic Panel, Cytopathology, Flow Cytometry. , check  MRI Head w/wo. Patient will likely need PO sedation to tolerate study.    Ammonia 17, B12 566, Folate 18.3, TSH 1.67.  .- No need for EEG at this time, suspicion for seizure is low  - Psych consulted for assistance w/ agitation and depression: Would hold off on any medications in the setting of lethargy; If severely agitated can give Haldol 0.5mg PO/IV/IM q 6 hours PRN   - TSH is normal, B12 and folate normal, ammonia level normal  -PT notified about assessment before and after LP

## 2022-09-30 NOTE — PROGRESS NOTE ADULT - PROBLEM SELECTOR PLAN 8
no previous dx of PCV  epo low normal  monitor closely  consider asa 81 daily no previous dx of PCV  epo low normal  monitor closely  consider asa 81 daily, will start after MRI

## 2022-09-30 NOTE — DISCHARGE NOTE PROVIDER - NPI NUMBER (FOR SYSADMIN USE ONLY) :
[[id number]] [[id number]],[[id number]],[[id number]] [[id number]],[[id number]] [[id number]],[[id number]],[[id number]]

## 2022-09-30 NOTE — DISCHARGE NOTE PROVIDER - NSDCFUADDAPPT_GEN_ALL_CORE_FT
Follow up with your primary care physician for further monitoring in 1-2 weeks. Please call to arrange appointment.   Follow up with your primary care physician for further monitoring in 1-2 weeks. Please call to arrange appointment.  Follow up with neurology, Dr. Burton within 1-2 weeks of discharge.   Follow up with your primary care physician for further monitoring in 1-2 weeks. Please call to arrange appointment.  Follow up with neurology, Dr. Burton within 1-2 weeks of discharge.  Follow up with neurosurgery, Dr. Siu within 3-4 weeks of discharge.    Follow up with your primary care physician for further monitoring in 1-2 weeks. Please call to arrange appointment.  Follow up with neurology, Dr. Burton within 1-2 weeks of discharge.  Follow up with neurosurgery, Dr. Siu within 3-4 weeks of discharge if you are interested in biopsy.

## 2022-09-30 NOTE — DISCHARGE NOTE PROVIDER - NSDCCPCAREPLAN_GEN_ALL_CORE_FT
PRINCIPAL DISCHARGE DIAGNOSIS  Diagnosis: AMS (altered mental status)  Assessment and Plan of Treatment: 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 mental status improving   - TSH is normal, B12 and folate normal, ammonia level normal  MRI head showed___        SECONDARY DISCHARGE DIAGNOSES  Diagnosis: Urinary retention  Assessment and Plan of Treatment: Continue vesicare for overactive bladder  -freq toileting to assist with void.         Diagnosis: Arthritis  Assessment and Plan of Treatment: continue Tylenol as needed for pain    Diagnosis: Hyperlipidemia  Assessment and Plan of Treatment: low fat diet,  C/w lipitor.     PRINCIPAL DISCHARGE DIAGNOSIS  Diagnosis: AMS (altered mental status)  Assessment and Plan of Treatment: 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 mental status improving   - TSH is normal, B12 and folate normal, ammonia level normal  MRI head showed___  - Follow up with Neurology Dr. Burton for further management of NPH          SECONDARY DISCHARGE DIAGNOSES  Diagnosis: Urinary retention  Assessment and Plan of Treatment: Continue vesicare for overactive bladder  -freq toileting to assist with void.         Diagnosis: Arthritis  Assessment and Plan of Treatment: continue Tylenol as needed for pain    Diagnosis: Hyperlipidemia  Assessment and Plan of Treatment: low fat diet,  C/w lipitor.  Echo w/ minimal MR, mild diastolic dysfunction grade I     PRINCIPAL DISCHARGE DIAGNOSIS  Diagnosis: AMS (altered mental status)  Assessment and Plan of Treatment: You were admitted for altered mental status. Your TSH is normal, B12 and folate normal, ammonia level normal   You had an MRI, which showed a brain mass. Follow up with Neurology Dr. Burton for further management of NPH      SECONDARY DISCHARGE DIAGNOSES  Diagnosis: Hyperlipidemia  Assessment and Plan of Treatment: Low fat diet, exercise daily and continue current medications. Follow up with primary care physician and cardiologist for management.    Diagnosis: Urinary retention  Assessment and Plan of Treatment: Continue vesicare for overactive bladder.       Diagnosis: Hyperlipidemia  Assessment and Plan of Treatment: Low fat diet, exercise daily and continue current medications. Follow up with primary care physician and cardiologist for management.    Diagnosis: Arthritis  Assessment and Plan of Treatment: continue Tylenol as needed for pain     PRINCIPAL DISCHARGE DIAGNOSIS  Diagnosis: AMS (altered mental status)  Assessment and Plan of Treatment: You were admitted for altered mental status. Your TSH is normal, B12 and folate normal, ammonia level normal   You had an MRI, which showed a brain mass. Follow up with neurosurgery, Dr. Siu and neurology Dr. Burton for further management of NPH      SECONDARY DISCHARGE DIAGNOSES  Diagnosis: Brain mass  Assessment and Plan of Treatment: You were found to have a brain mass. You are to follow up with neurosurgery, Dr. Siu as outpatient.    Diagnosis: NPH (normal pressure hydrocephalus)  Assessment and Plan of Treatment: Follow up with neurology, Dr. Burton.    Diagnosis: Hyperlipidemia  Assessment and Plan of Treatment: Low fat diet, exercise daily and continue current medications. Follow up with primary care physician and cardiologist for management.    Diagnosis: Urinary retention  Assessment and Plan of Treatment: Continue vesicare for overactive bladder.       Diagnosis: Hyperlipidemia  Assessment and Plan of Treatment: Low fat diet, exercise daily and continue current medications. Follow up with primary care physician and cardiologist for management.    Diagnosis: Arthritis  Assessment and Plan of Treatment: continue Tylenol as needed for pain     PRINCIPAL DISCHARGE DIAGNOSIS  Diagnosis: AMS (altered mental status)  Assessment and Plan of Treatment: You were admitted for altered mental status. Your TSH is normal, B12 and folate normal, ammonia level normal   You had an MRI, which showed a brain mass. Follow up with neurosurgery, Dr. Siu and neurology Dr. Burton for further management of NPH      SECONDARY DISCHARGE DIAGNOSES  Diagnosis: Hyperlipidemia  Assessment and Plan of Treatment: Low fat diet, exercise daily and continue current medications. Follow up with primary care physician and cardiologist for management.    Diagnosis: Urinary retention  Assessment and Plan of Treatment: Continue vesicare for overactive bladder.       Diagnosis: Arthritis  Assessment and Plan of Treatment: continue Tylenol as needed for pain    Diagnosis: Hyperlipidemia  Assessment and Plan of Treatment: Low fat diet, exercise daily and continue current medications. Follow up with primary care physician for management.    Diagnosis: NPH (normal pressure hydrocephalus)  Assessment and Plan of Treatment: Follow up with neurology, Dr. Burton.    Diagnosis: Brain mass  Assessment and Plan of Treatment: You were found to have a brain mass. You are to follow up with neurosurgery, Dr. Siu as outpatient.

## 2022-09-30 NOTE — DISCHARGE NOTE PROVIDER - DETAILS OF MALNUTRITION DIAGNOSIS/DIAGNOSES
This patient has been assessed with a concern for Malnutrition and was treated during this hospitalization for the following Nutrition diagnosis/diagnoses:     -  09/22/2022: Severe protein-calorie malnutrition

## 2022-09-30 NOTE — DISCHARGE NOTE PROVIDER - HOSPITAL COURSE
82 y.o female pmh of hydrocephalus, OA presents with encephalopathy. CTH unremarkable, Neurology consulted, AMS due to electrolytes imbalance/hyponatremia in setting of likely NPH. Found with hyponatremia w/ urinary retention, nephro consulted s/p IVF, now resolving   s/p LP, seen by neuro   82 y.o female pmh of hydrocephalus, OA presents with encephalopathy. CTH unremarkable, Neurology consulted, AMS due to electrolytes imbalance/hyponatremia in setting of likely NPH. Found with hyponatremia w/ urinary retention, nephro consulted s/p IVF, now resolving      Brain mass.    - MR brain w/ w/o contrast with stable NPH, superficial siderosis c/f chronic or recurrent subarachnoid hemorrhage, extra-axial enhancing lesion R paramedian occipital convexity 1.3x1.5x1.1cm with mass effect in the R cerebellar hemisphere without edema c/f dural metastatic lesion vs. atypical meningioma   - neurosurgery consulted, recommending outpt evaluation and possible biopsy  - CT c/a/p neg for abnormalities  - pt family updated via telephone today with grandchildren present (daughter Araceli Meneses) -- prefer to defer invasive measures / biopsies at this time.    NPH (normal pressure hydrocephalus).   - pt p/w AMS and fall, likely 2/2 known NPH  - course c/b lethargy and continued AMS for which LP performed 9/29 with opening pressure 21, xanthochromia, glucose 70, protein 47, TNC 1, RBC 2650, LDH 20, PCR neg, gram stain neg, Cx NGTD  - mental status now improving  - neuro consulted -- f/u CSF paraneoplastic panel, cytopathology  - CSF MBP elevated  - CSF flow cytometry with lymphocytes  - MR brain w/ w/o contrast as above  - ammonia 17, B12/folate WNL, TSH WNL.  - Neurosurgery consulted, outpatient follow up     Gait instability.    - as above, p/w fall  - PT recs LISSETH   - fall precautions.    Hyperlipidemia.   - home med: atorvastatin 40mg/d continued   - Pt to follow up with PCP as outpatient     Nutrition, metabolism, and development symptoms.   - F: none  - E: replete K<4, Mg<2  - N: regular diet  - D: hep 5000U TID sq  - G: protonix 40mg daily (home med nexium)    DNR/DNI  PT recs LISSETH.    On_________, discussed with __________, patient is medically cleared and optimized for discharge today. All medications were reviewed with attending, and sent to mutually agreed upon pharmacy.   82 y.o female pmh of hydrocephalus, OA presents with encephalopathy. CTH unremarkable, Neurology consulted, AMS due to electrolytes imbalance/hyponatremia in setting of likely NPH. Found with hyponatremia w/ urinary retention, nephro consulted s/p IVF, now resolving      Brain mass.    - MR brain w/ w/o contrast with stable NPH, superficial siderosis c/f chronic or recurrent subarachnoid hemorrhage, extra-axial enhancing lesion R paramedian occipital convexity 1.3x1.5x1.1cm with mass effect in the R cerebellar hemisphere without edema c/f dural metastatic lesion vs. atypical meningioma   - neurosurgery consulted, recommending outpt evaluation and possible biopsy  - CT c/a/p neg for abnormalities  - pt family updated via telephone today with grandchildren present (daughter Araceli Meneses) -- prefer to defer invasive measures / biopsies at this time.    NPH (normal pressure hydrocephalus).   - pt p/w AMS and fall, likely 2/2 known NPH  - course c/b lethargy and continued AMS for which LP performed 9/29 with opening pressure 21, xanthochromia, glucose 70, protein 47, TNC 1, RBC 2650, LDH 20, PCR neg, gram stain neg, Cx NGTD  - mental status now improving  - neuro consulted -- f/u CSF paraneoplastic panel, cytopathology  - CSF MBP elevated  - CSF flow cytometry with lymphocytes  - MR brain w/ w/o contrast as above  - ammonia 17, B12/folate WNL, TSH WNL.  - Neurosurgery consulted, outpatient follow up     Gait instability.    - as above, p/w fall  - PT recs LISSETH   - fall precautions.    Hyperlipidemia.   - home med: atorvastatin 40mg/d continued   - Pt to follow up with PCP as outpatient     Nutrition, metabolism, and development symptoms.   - F: none  - E: replete K<4, Mg<2  - N: regular diet  - D: hep 5000U TID sq  - G: protonix 40mg daily (home med nexium)    DNR/DNI  PT recs LISSETH.    On 10/6/2022, discussed with Dr. Rivera, patient is medically cleared and optimized for discharge today. All medications were reviewed with attending, and sent to mutually agreed upon pharmacy.   82 y.o female pmh of hydrocephalus, OA presents with encephalopathy. CTH unremarkable, Neurology consulted, AMS due to electrolytes imbalance/hyponatremia in setting of likely NPH. Found with hyponatremia w/ urinary retention, nephro consulted s/p IVF, now resolving      Brain mass.    - MR brain w/ w/o contrast with stable NPH, superficial siderosis c/f chronic or recurrent subarachnoid hemorrhage, extra-axial enhancing lesion R paramedian occipital convexity 1.3x1.5x1.1cm with mass effect in the R cerebellar hemisphere without edema c/f dural metastatic lesion vs. atypical meningioma   - neurosurgery consulted, recommending outpt evaluation and possible biopsy  - CT c/a/p neg for abnormalities  - pt family updated via telephone today with grandchildren present (daughter Araceli Meneses) -- prefer to defer invasive measures / biopsies at this time.    NPH (normal pressure hydrocephalus).   - pt p/w AMS and fall, likely 2/2 known NPH  - course c/b lethargy and continued AMS for which LP performed 9/29 with opening pressure 21, xanthochromia, glucose 70, protein 47, TNC 1, RBC 2650, LDH 20, PCR neg, gram stain neg, Cx NGTD  - mental status now improving  - neuro consulted -- f/u CSF paraneoplastic panel, cytopathology  - CSF MBP elevated  - CSF flow cytometry with lymphocytes  - MR brain w/ w/o contrast as above  - ammonia 17, B12/folate WNL, TSH WNL.  - Neurosurgery consulted, outpatient follow up     Gait instability.    - as above, p/w fall  - PT recs LISSETH   - fall precautions.    Hyperlipidemia.   - home med: atorvastatin 40mg/d continued   - Pt to follow up with PCP as outpatient     Nutrition, metabolism, and development symptoms.   - F: none  - E: replete K<4, Mg<2  - N: regular diet  - D: hep 5000U TID sq  - G: protonix 40mg daily (home med nexium)    DNR/DNI  PT recs LISSETH.    On 10/7/2022, discussed with Dr. Rivera, patient is medically cleared and optimized for discharge today. All medications were reviewed with attending, and sent to mutually agreed upon pharmacy.   Pt is an 83 yo F with PMH HLD, NPH, and OA p/w AMS and falls thought to be d/t NPH. Neuro following. 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 CT c/a/p neg for any additional abnormalities.     Brain mass  - MR brain w/ w/o contrast with stable NPH, superficial siderosis c/f chronic or recurrent subarachnoid hemorrhage, extra-axial enhancing lesion R paramedian occipital convexity 1.3x1.5x1.1cm with mass effect in the R cerebellar hemisphere without edema c/f dural metastatic lesion vs. atypical meningioma   - neurosurgery consulted, recommending outpt evaluation and possible biopsy  - CT c/a/p neg for abnormalities  - pt family updated via telephone today with grandchildren present (daughter Araceli Meneses) -- prefer to defer invasive measures / biopsies at this time.    NPH  - pt p/w AMS and fall, likely 2/2 known NPH  - course c/b lethargy and continued AMS for which LP performed 9/29 with opening pressure 21, xanthochromia, glucose 70, protein 47, TNC 1, RBC 2650, LDH 20, PCR neg, gram stain neg, Cx NGTD  - neuro consulted -- f/u CSF paraneoplastic panel, cytopathology  - CSF MBP elevated  - CSF flow cytometry with lymphocytes  - MR brain w/ w/o contrast as above  - ammonia 17, B12/folate WNL, TSH WNL.  - Neurosurgery consulted, outpatient follow up     Gait instability  - as above, p/w fall  - PT recs LISSETH   - fall precautions.    Hyperlipidemia  - home med: atorvastatin 40mg/d continued   - Pt to follow up with PCP as outpatient     On 10/7/2022, discussed with Dr. Rivera, patient is medically cleared and optimized for discharge today. All medications were reviewed with attending, and sent to mutually agreed upon pharmacy.

## 2022-09-30 NOTE — DISCHARGE NOTE PROVIDER - CARE PROVIDERS DIRECT ADDRESSES
,DirectAddress_Unknown ,DirectAddress_Unknown,[email or web address] ,[email or web address],[email or web address] ,DirectAddress_Unknown,[email or web address]

## 2022-09-30 NOTE — DISCHARGE NOTE PROVIDER - NSDCMRMEDTOKEN_GEN_ALL_CORE_FT
atorvastatin 40 mg oral tablet: 1 tab(s) orally once a day  NexIUM: otc 1 tab daily  senna oral tablet: 2 tab(s) orally once a day (at bedtime)  discharge home with over the counter for constipation caused by pain meds  hold for loose BMs  VESIcare 5 mg oral tablet: 1 tab(s) orally once a day   atorvastatin 40 mg oral tablet: 1 tab(s) orally once a day  mirtazapine 7.5 mg oral tablet: 1 tab(s) orally once a day (at bedtime)  NexIUM: otc 1 tab daily  senna oral tablet: 2 tab(s) orally once a day (at bedtime)  discharge home with over the counter for constipation caused by pain meds  hold for loose BMs  VESIcare 5 mg oral tablet: 1 tab(s) orally once a day

## 2022-09-30 NOTE — PROGRESS NOTE ADULT - PROBLEM SELECTOR PLAN 7
Heparin SQ-holding for LP, will ise SCDs for now    will repeat S&S eval as  now more awake. Pt tolerating diet, nutrition f/u appreciated   encouragement and assistance with feeding        MOLST completed DNR/DNI Heparin SQ- for DVT PPx   supportive care  aspiration precaution   skin care as per protocol   ELOY completed DNR/DNI

## 2022-09-30 NOTE — PROGRESS NOTE ADULT - ASSESSMENT
Assessment: 83 yo female with a PMHx of R femoral neck fracture (s/p R hip hemiarthroplasty), HLD, and concern for NPH who presented to [facility] on 9/21 for AMS. Son reported patient was lethargic for 2 days PTA. He also reported she has been having progressive decline with recurrent falls over last several months. At baseline, patient ambulates with a walker and is oriented x2-3. CT head no acute findings.     Impression: AMS possibly due to toxic metabolic cause vs communicating hydrocephalus vs inflammatory process. Likely some component of hospital-induced delirium and possibly underlying dementia process as patient's mental status is waxing/waning overall.    Recommendations:  [] Neuro checks per routine.  [] MRI Head w/wo. Patient will likely need PO sedation to tolerate study. Would try to schedule study in the morning as she seems to be less agitated earlier in the day.  [x] s/p LP, opening pressure 21. Xanthochromia present. CSF glucose 70, protein 47 <H>, TNC 1, RBC 2650, LDH 20. PCR panel neg. Gram stain neg. Cx NGTD.  [] f/u remaining CSF studies: MBP, Paraneoplastic Panel, Cytopathology, Flow Cytometry.  [x] Ammonia 17, B12 566, Folate 18.3, TSH 1.67.  [] PT/OT evals.  [] Measures to reduce delirium: frequent reorientation, maintain sleep/wake routine, avoid sleeping during the day, keep lights on during the day, use of hearing aids or glasses if patient needs them, regular toileting. Move patient to window bed if one becomes available.  [] Patient should follow up with her private neurologist, Dr. Burton, after discharge for further workup/management of possible NPH.    Case seen and discussed with neurology attending Dr. Haines.

## 2022-09-30 NOTE — DISCHARGE NOTE PROVIDER - CARE PROVIDER_API CALL
TYLER WARD  Internal Medicine  [address] [place] [address]  Phone: [phone]  Fax: ()-  Follow Up Time:    TYLER WARD  Internal Medicine  [address] [place] [address]  Phone: ()-  Fax: ()-  Follow Up Time:     Kiara Burton  NEUROLOGY  [address], [address]  [place] [address]  Phone: [phone]  Fax: [phone]  Follow Up Time:     Joel Siu (MD)  Neurosurgery  [address], [address]  [place] [address]  Phone: [phone]  Fax: [phone]  Follow Up Time:    TYLER WARD  Internal Medicine  [address] [place] [address]  Phone: ()-  Fax: ()-  Follow Up Time:     Kiara Burton  NEUROLOGY  [address] [place] [address]  Phone: [phone]  Fax: [phone]  Follow Up Time:    TYLER WARD  Internal Medicine  [address] [place] [address]  Phone: ()-  Fax: ()-  Follow Up Time:     Kiara Burton  NEUROLOGY  [address], [address]  [place] [address]  Phone: [phone]  Fax: [phone]  Follow Up Time:     Joel Siu (MD)  Neurosurgery  [address], [address]  [place] [address]  Phone: [phone]  Fax: [phone]  Follow Up Time:

## 2022-09-30 NOTE — PROGRESS NOTE ADULT - SUBJECTIVE AND OBJECTIVE BOX
MRN-[id number]    Subjective: 81 yo female seen and examined at bedside. No events overnight. Patient complains her head "feels heavy," but is unable to further describe this sensation.    PAST MEDICAL & SURGICAL HISTORY:  Hearing loss  bilateral -- wears aids    OA (osteoarthritis)  first carpometacarpal joint of right hand    Overactive bladder    Fibroids  1990  hysterectomy    FAMILY HISTORY:  No pertinent family history in first degree relatives    Social Hx:  Nonsmoker, no drug or alcohol use    Home Medications:  atorvastatin 40 mg oral tablet: 1 tab(s) orally once a day (21 Sep 2022 17:51)  NexIUM: otc 1 tab daily (21 Sep 2022 17:51)  senna oral tablet: 2 tab(s) orally once a day (at bedtime)  discharge home with over the counter for constipation caused by pain meds  hold for loose BMs (21 Sep 2022 17:51)  VESIcare 5 mg oral tablet: 1 tab(s) orally once a day (21 Sep 2022 17:51)    MEDICATIONS  (STANDING):  atorvastatin 40 milliGRAM(s) Oral at bedtime  heparin   Injectable 5000 Unit(s) SubCutaneous every 12 hours  pantoprazole    Tablet 40 milliGRAM(s) Oral before breakfast  senna 2 Tablet(s) Oral at bedtime  sodium chloride 0.9%. 1000 milliLiter(s) (75 mL/Hr) IV Continuous <Continuous>    MEDICATIONS  (PRN):  acetaminophen     Tablet .. 650 milliGRAM(s) Oral every 6 hours PRN Temp greater or equal to 38C (100.4F), Mild Pain (1 - 3)    Allergies  penicillin (Other)    ROS: Pertinent positives above, all other ROS were reviewed and are negative.      Vital Signs Last 24 Hrs  T(C): 36.5 (30 Sep 2022 04:14), Max: 36.7 (29 Sep 2022 13:00)  T(F): 97.7 (30 Sep 2022 04:14), Max: 98 (29 Sep 2022 13:00)  HR: 82 (30 Sep 2022 04:14) (80 - 91)  BP: 134/64 (30 Sep 2022 04:14) (107/67 - 134/64)  RR: 18 (30 Sep 2022 04:14) (17 - 18)  SpO2: 99% (30 Sep 2022 04:14) (98% - 99%)    Parameters below as of 30 Sep 2022 04:14  Patient On (Oxygen Delivery Method): room air    GENERAL EXAM:  Constitutional: Lying in bed, NAD.  Head: Normocephalic, atraumatic.  Extremities: No edema, no cyanosis.    NEUROLOGICAL EXAM: Exam:   MS: Sleeping, eyes closed. Eyes open to verbal/light tactile stimuli. Oriented to self only. States she is at home. Unable to state month. States year is 2023. States president is Luciano. Answers questions in short 2-4 word phrases, no spontaneous speech. Not slurring. Follows simple commands.  CN: L CN6 palsy. Face symmetric.   Motor: Lifts all extremities against gravity. ?slight weakness in LUE, however patient does not follow command to maintain UEs against gravity at requested angle/length of time.  Sensory: Intact to LT throughout.  Coordination/Gait: Not assessed.    Labs:   cbc                      15.7   5.82  )-----------( 197      ( 30 Sep 2022 05:18 )             46.9     [id number]    142  |  105  |  30<H>  ----------------------------<  101<H>  3.9   |  26  |  0.48<L>    Ca    8.8      30 Sep 2022 05:18  Phos  3.1     09-30  Mg     2.00     09-30    Coags: PT/INR - ( 29 Sep 2022 07:45 )   PT: 12.6 sec;   INR: 1.09 ratio      PTT - ( 29 Sep 2022 07:45 )  PTT:36.3 sec  [id number] Chol 218<H> LDL -- HDL 60 Trig 149    Radiology:  -09/21 CTH: No acute intracranial hemorrhage or mass effect.

## 2022-09-30 NOTE — DISCHARGE NOTE PROVIDER - ATTENDING DISCHARGE PHYSICAL EXAMINATION:
Constitutional: elderly frail F; sitting comfortably in bedside chair, awake  Head: NC/AT; independently holding head up, opening eyes and looking around  Eyes: PERRL, EOMI, anicteric sclera  ENT: no nasal discharge; MMM  Neck: supple; no JVD  Respiratory: CTA B/L; no W/R/R; on RA without respiratory distress  Cardiac: +S1/S2; RRR; no M/R/G  Gastrointestinal: soft, NT/ND; no rebound or guarding; +BSx4  Extremities: WWP, no clubbing or cyanosis; no peripheral edema  Musculoskeletal: NROM x4; no joint swelling, tenderness or erythema  Vascular: 2+ radial, DP/PT pulses B/L  Dermatologic: skin warm, dry and intact; no rashes, wounds, or scars  Neurologic: AAOx3; CN VI palsy L; moves all extremities spontaneously

## 2022-10-01 PROCEDURE — 99232 SBSQ HOSP IP/OBS MODERATE 35: CPT

## 2022-10-01 RX ADMIN — PANTOPRAZOLE SODIUM 40 MILLIGRAM(S): 20 TABLET, DELAYED RELEASE ORAL at 05:40

## 2022-10-01 RX ADMIN — ATORVASTATIN CALCIUM 40 MILLIGRAM(S): 80 TABLET, FILM COATED ORAL at 21:56

## 2022-10-01 RX ADMIN — HEPARIN SODIUM 5000 UNIT(S): 5000 INJECTION INTRAVENOUS; SUBCUTANEOUS at 05:40

## 2022-10-01 RX ADMIN — HEPARIN SODIUM 5000 UNIT(S): 5000 INJECTION INTRAVENOUS; SUBCUTANEOUS at 17:24

## 2022-10-01 RX ADMIN — SENNA PLUS 2 TABLET(S): 8.6 TABLET ORAL at 21:56

## 2022-10-01 NOTE — PROGRESS NOTE ADULT - NSPROGADDITIONALINFOA_GEN_ALL_CORE
plan of care d/w daughter on the phone   case d/w ACP message left for daughter on the phone   case d/w ACP

## 2022-10-01 NOTE — PROGRESS NOTE ADULT - PROBLEM SELECTOR PLAN 7
Heparin SQ- for DVT PPx   supportive care  aspiration precaution   skin care as per protocol   ELOY completed DNR/DNI

## 2022-10-01 NOTE — PROGRESS NOTE ADULT - PROBLEM SELECTOR PLAN 1
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 Xanthochromia present. CSF glucose 70, protein 47 <H>, TNC 1, RBC 2650, LDH 20. PCR panel neg. Gram stain neg. Cx NGTD.  Neuro f/u appreciated, recommend  f/u remaining CSF studies: MBP, Paraneoplastic Panel, Cytopathology, Flow Cytometry. , check  MRI Head w/wo. Patient will likely need PO sedation to tolerate study.    Ammonia 17, B12 566, Folate 18.3, TSH 1.67.  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 Xanthochromia present. CSF glucose 70, protein 47 <H>, TNC 1, RBC 2650, LDH 20. PCR panel neg. Gram stain neg. Cx NGTD.  Neuro f/u appreciated, recommend  f/u remaining CSF studies: MBP, Paraneoplastic Panel, Cytopathology, Flow Cytometry. , check  MRI Head w/wo. Patient will likely need PO sedation to tolerate study.    Ammonia 17, B12 566, Folate 18.3, TSH 1.67.  .- No need for EEG at this time, suspicion for seizure is low  - Psych consulted for assistance w/ agitation and depression: Would hold off on any medications in the setting of lethargy; If severely agitated can give Haldol 0.5mg PO/IV/IM q 6 hours PRN   - TSH is normal, B12 and folate normal, ammonia level normal

## 2022-10-01 NOTE — PROGRESS NOTE ADULT - SUBJECTIVE AND OBJECTIVE BOX
Patient is a 82y old  Female who presents with a chief complaint of AMS (30 Sep 2022 17:01)      SUBJECTIVE / OVERNIGHT EVENTS:    MEDICATIONS  (STANDING):  atorvastatin 40 milliGRAM(s) Oral at bedtime  heparin   Injectable 5000 Unit(s) SubCutaneous every 12 hours  pantoprazole    Tablet 40 milliGRAM(s) Oral before breakfast  senna 2 Tablet(s) Oral at bedtime  sodium chloride 0.9%. 1000 milliLiter(s) (75 mL/Hr) IV Continuous <Continuous>    MEDICATIONS  (PRN):  acetaminophen     Tablet .. 650 milliGRAM(s) Oral every 6 hours PRN Temp greater or equal to 38C (100.4F), Mild Pain (1 - 3)  diazepam    Tablet 5 milliGRAM(s) Oral once PRN on call for MRI      Vital Signs Last 24 Hrs  T(C): 36.5 (01 Oct 2022 05:40), Max: 36.6 (30 Sep 2022 20:03)  T(F): 97.7 (01 Oct 2022 05:40), Max: 97.8 (30 Sep 2022 20:03)  HR: 91 (01 Oct 2022 05:40) (89 - 96)  BP: 131/68 (01 Oct 2022 05:40) (122/56 - 131/68)  BP(mean): --  RR: 17 (01 Oct 2022 05:40) (17 - 17)  SpO2: 100% (01 Oct 2022 05:40) (98% - 100%)    Parameters below as of 01 Oct 2022 05:40  Patient On (Oxygen Delivery Method): room air      CAPILLARY BLOOD GLUCOSE        I&O's Summary      PHYSICAL EXAM:  GENERAL: NAD, well-developed  HEAD:  Atraumatic, Normocephalic  EYES: EOMI, PERRLA, conjunctiva and sclera clear  NECK: Supple, No JVD  CHEST/LUNG: Clear to auscultation bilaterally; No wheeze  HEART: Regular rate and rhythm; No murmurs, rubs, or gallops  ABDOMEN: Soft, Nontender, Nondistended; Bowel sounds present  EXTREMITIES:  2+ Peripheral Pulses, No clubbing, cyanosis, or edema  PSYCH: AAOx3  NEUROLOGY: non-focal  SKIN: No rashes or lesions    LABS:                        15.7   5.82  )-----------( 197      ( 30 Sep 2022 05:18 )             46.9     09-30    142  |  105  |  30<H>  ----------------------------<  101<H>  3.9   |  26  |  0.48<L>    Ca    8.8      30 Sep 2022 05:18  Phos  3.1     09-30  Mg     2.00     09-30                RADIOLOGY & ADDITIONAL TESTS:    Imaging Personally Reviewed:    Consultant(s) Notes Reviewed:      Care Discussed with Consultants/Other Providers:   Patient is a 82y old  Female who presents with a chief complaint of AMS (30 Sep 2022 17:01)      SUBJECTIVE / OVERNIGHT EVENTS: patient seen and examined by bedside, pt awake, denies headache, dizziness, SOB, CP, Palpitations , N/V/D, abdominal pain  no acute distress noted      MEDICATIONS  (STANDING):  atorvastatin 40 milliGRAM(s) Oral at bedtime  heparin   Injectable 5000 Unit(s) SubCutaneous every 12 hours  pantoprazole    Tablet 40 milliGRAM(s) Oral before breakfast  senna 2 Tablet(s) Oral at bedtime  sodium chloride 0.9%. 1000 milliLiter(s) (75 mL/Hr) IV Continuous <Continuous>    MEDICATIONS  (PRN):  acetaminophen     Tablet .. 650 milliGRAM(s) Oral every 6 hours PRN Temp greater or equal to 38C (100.4F), Mild Pain (1 - 3)  diazepam    Tablet 5 milliGRAM(s) Oral once PRN on call for MRI      Vital Signs Last 24 Hrs  T(C): 36.5 (01 Oct 2022 05:40), Max: 36.6 (30 Sep 2022 20:03)  T(F): 97.7 (01 Oct 2022 05:40), Max: 97.8 (30 Sep 2022 20:03)  HR: 91 (01 Oct 2022 05:40) (89 - 96)  BP: 131/68 (01 Oct 2022 05:40) (122/56 - 131/68)  BP(mean): --  RR: 17 (01 Oct 2022 05:40) (17 - 17)  SpO2: 100% (01 Oct 2022 05:40) (98% - 100%)    Parameters below as of 01 Oct 2022 05:40  Patient On (Oxygen Delivery Method): room air          PHYSICAL EXAM:  CONSTITUTIONAL: NAD, appears comfortable  EYES: PERRLA; conjunctiva and sclera clear  ENMT: Moist oral mucosa; normal dentition  RESPIRATORY: Normal respiratory effort; grossly b/l AE  CARDIOVASCULAR: Regular rate and rhythm; No lower extremity edema;  ABDOMEN: Nontender to palpation, normoactive bowel sounds  MUSCULOSKELETAL:  no clubbing or cyanosis of digits; no joint swelling or tenderness to palpation  PSYCH: calm ,  NEUROLOGY: moving all extremities , L CN VI palsy   SKIN: No rashes; no palpable lesions          LABS:                        15.7   5.82  )-----------( 197      ( 30 Sep 2022 05:18 )             46.9     09-30    142  |  105  |  30<H>  ----------------------------<  101<H>  3.9   |  26  |  0.48<L>    Ca    8.8      30 Sep 2022 05:18  Phos  3.1     09-30  Mg     2.00     09-30                RADIOLOGY & ADDITIONAL TESTS:    Imaging Personally Reviewed:    Consultant(s) Notes Reviewed:      Care Discussed with Consultants/Other Providers:

## 2022-10-02 LAB
CULTURE RESULTS: SIGNIFICANT CHANGE UP
SPECIMEN SOURCE: SIGNIFICANT CHANGE UP

## 2022-10-02 PROCEDURE — 99232 SBSQ HOSP IP/OBS MODERATE 35: CPT

## 2022-10-02 PROCEDURE — 70553 MRI BRAIN STEM W/O & W/DYE: CPT | Mod: 26

## 2022-10-02 RX ADMIN — HEPARIN SODIUM 5000 UNIT(S): 5000 INJECTION INTRAVENOUS; SUBCUTANEOUS at 05:44

## 2022-10-02 RX ADMIN — Medication 5 MILLIGRAM(S): at 13:46

## 2022-10-02 RX ADMIN — SENNA PLUS 2 TABLET(S): 8.6 TABLET ORAL at 21:37

## 2022-10-02 RX ADMIN — ATORVASTATIN CALCIUM 40 MILLIGRAM(S): 80 TABLET, FILM COATED ORAL at 21:38

## 2022-10-02 RX ADMIN — PANTOPRAZOLE SODIUM 40 MILLIGRAM(S): 20 TABLET, DELAYED RELEASE ORAL at 05:44

## 2022-10-02 RX ADMIN — HEPARIN SODIUM 5000 UNIT(S): 5000 INJECTION INTRAVENOUS; SUBCUTANEOUS at 17:18

## 2022-10-02 NOTE — PROGRESS NOTE ADULT - SUBJECTIVE AND OBJECTIVE BOX
Patient is a 82y old  Female who presents with a chief complaint of AMS (01 Oct 2022 10:36)      SUBJECTIVE / OVERNIGHT EVENTS:    MEDICATIONS  (STANDING):  atorvastatin 40 milliGRAM(s) Oral at bedtime  heparin   Injectable 5000 Unit(s) SubCutaneous every 12 hours  pantoprazole    Tablet 40 milliGRAM(s) Oral before breakfast  senna 2 Tablet(s) Oral at bedtime  sodium chloride 0.9%. 1000 milliLiter(s) (75 mL/Hr) IV Continuous <Continuous>    MEDICATIONS  (PRN):  acetaminophen     Tablet .. 650 milliGRAM(s) Oral every 6 hours PRN Temp greater or equal to 38C (100.4F), Mild Pain (1 - 3)  diazepam    Tablet 5 milliGRAM(s) Oral once PRN on call for MRI      Vital Signs Last 24 Hrs  T(C): 36.7 (02 Oct 2022 05:44), Max: 36.9 (01 Oct 2022 13:44)  T(F): 98.1 (02 Oct 2022 05:44), Max: 98.4 (01 Oct 2022 13:44)  HR: 73 (02 Oct 2022 05:44) (73 - 89)  BP: 119/89 (02 Oct 2022 05:44) (117/74 - 126/69)  BP(mean): --  RR: 17 (02 Oct 2022 05:44) (17 - 17)  SpO2: 100% (02 Oct 2022 05:44) (98% - 100%)    Parameters below as of 02 Oct 2022 05:44  Patient On (Oxygen Delivery Method): room air      CAPILLARY BLOOD GLUCOSE        I&O's Summary      PHYSICAL EXAM:  GENERAL: NAD, well-developed  HEAD:  Atraumatic, Normocephalic  EYES: EOMI, PERRLA, conjunctiva and sclera clear  NECK: Supple, No JVD  CHEST/LUNG: Clear to auscultation bilaterally; No wheeze  HEART: Regular rate and rhythm; No murmurs, rubs, or gallops  ABDOMEN: Soft, Nontender, Nondistended; Bowel sounds present  EXTREMITIES:  2+ Peripheral Pulses, No clubbing, cyanosis, or edema  PSYCH: AAOx3  NEUROLOGY: non-focal  SKIN: No rashes or lesions    LABS:                    RADIOLOGY & ADDITIONAL TESTS:    Imaging Personally Reviewed:    Consultant(s) Notes Reviewed:      Care Discussed with Consultants/Other Providers:   Patient is a 82y old  Female who presents with a chief complaint of AMS (01 Oct 2022 10:36)      SUBJECTIVE / OVERNIGHT EVENTS: patient seen and examined by bedside, pt sitting in chair today, alert, awake, answering questions appropriately , aware of bsinh in hospital, unable to state month and year , denies headache, dizziness, SOB, CP, Palpitations , N/V/D, abdominal pain  Ate moderately well as per staff     MEDICATIONS  (STANDING):  atorvastatin 40 milliGRAM(s) Oral at bedtime  heparin   Injectable 5000 Unit(s) SubCutaneous every 12 hours  pantoprazole    Tablet 40 milliGRAM(s) Oral before breakfast  senna 2 Tablet(s) Oral at bedtime  sodium chloride 0.9%. 1000 milliLiter(s) (75 mL/Hr) IV Continuous <Continuous>    MEDICATIONS  (PRN):  acetaminophen     Tablet .. 650 milliGRAM(s) Oral every 6 hours PRN Temp greater or equal to 38C (100.4F), Mild Pain (1 - 3)  diazepam    Tablet 5 milliGRAM(s) Oral once PRN on call for MRI      Vital Signs Last 24 Hrs  T(C): 36.7 (02 Oct 2022 05:44), Max: 36.9 (01 Oct 2022 13:44)  T(F): 98.1 (02 Oct 2022 05:44), Max: 98.4 (01 Oct 2022 13:44)  HR: 73 (02 Oct 2022 05:44) (73 - 89)  BP: 119/89 (02 Oct 2022 05:44) (117/74 - 126/69)  BP(mean): --  RR: 17 (02 Oct 2022 05:44) (17 - 17)  SpO2: 100% (02 Oct 2022 05:44) (98% - 100%)    Parameters below as of 02 Oct 2022 05:44  Patient On (Oxygen Delivery Method): room air      CAPILLARY BLOOD GLUCOSE        I&O's Summary      PHYSICAL EXAM:  CONSTITUTIONAL: NAD, appears comfortable  EYES: PERRLA; conjunctiva and sclera clear  ENMT: Moist oral mucosa; normal dentition  RESPIRATORY: Normal respiratory effort; grossly b/l AE  CARDIOVASCULAR: Regular rate and rhythm; No lower extremity edema;  ABDOMEN: Nontender to palpation, normoactive bowel sounds  MUSCULOSKELETAL:  no clubbing or cyanosis of digits; no joint swelling or tenderness to palpation  PSYCH: calm ,  NEUROLOGY: moving all extremities , L CN VI palsy   SKIN: No rashes; no palpable lesions        LABS:        no new labs             RADIOLOGY & ADDITIONAL TESTS:    Imaging Personally Reviewed:    Consultant(s) Notes Reviewed:      Care Discussed with Consultants/Other Providers:

## 2022-10-02 NOTE — PROGRESS NOTE ADULT - PROBLEM SELECTOR PROBLEM 6
Hyperlipidemia
DVT prophylaxis
Hyperlipidemia

## 2022-10-02 NOTE — PROGRESS NOTE ADULT - PROBLEM SELECTOR PLAN 1
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 Xanthochromia present. CSF glucose 70, protein 47 <H>, TNC 1, RBC 2650, LDH 20. PCR panel neg. Gram stain neg. Cx NGTD.  Neuro f/u appreciated, recommend  f/u remaining CSF studies: MBP, Paraneoplastic Panel, Cytopathology, Flow Cytometry. , check  MRI Head w/wo. Patient will likely need PO sedation to tolerate study.    Ammonia 17, B12 566, Folate 18.3, TSH 1.67.  .- No need for EEG at this time, suspicion for seizure is low  - Psych consulted for assistance w/ agitation and depression: Would hold off on any medications in the setting of lethargy; If severely agitated can give Haldol 0.5mg PO/IV/IM q 6 hours PRN   - TSH is normal, B12 and folate normal, ammonia level normal

## 2022-10-02 NOTE — PROGRESS NOTE ADULT - PROBLEM SELECTOR PROBLEM 8
Elevated hemoglobin

## 2022-10-02 NOTE — PROGRESS NOTE ADULT - PROBLEM SELECTOR PLAN 6
C/w lipitor
Heparin SQ  PT/OT/Dietitian evaluations once more awake. Keep NPO for now given mental status.       D/w son Logan and daughter Araceli at bedside  D/w Neurology  D/w Grandson  ELOY completed DNR/DNI
C/w lipitor
C/w lipitor

## 2022-10-03 DIAGNOSIS — G93.89 OTHER SPECIFIED DISORDERS OF BRAIN: ICD-10-CM

## 2022-10-03 DIAGNOSIS — R63.8 OTHER SYMPTOMS AND SIGNS CONCERNING FOOD AND FLUID INTAKE: ICD-10-CM

## 2022-10-03 DIAGNOSIS — R26.81 UNSTEADINESS ON FEET: ICD-10-CM

## 2022-10-03 LAB
MBP CSF-MCNC: 16.4 NG/ML — HIGH (ref 0–5.6)
SARS-COV-2 RNA SPEC QL NAA+PROBE: SIGNIFICANT CHANGE UP
TM INTERPRETATION: SIGNIFICANT CHANGE UP

## 2022-10-03 PROCEDURE — 99232 SBSQ HOSP IP/OBS MODERATE 35: CPT

## 2022-10-03 PROCEDURE — 99233 SBSQ HOSP IP/OBS HIGH 50: CPT

## 2022-10-03 RX ADMIN — ATORVASTATIN CALCIUM 40 MILLIGRAM(S): 80 TABLET, FILM COATED ORAL at 21:59

## 2022-10-03 RX ADMIN — SENNA PLUS 2 TABLET(S): 8.6 TABLET ORAL at 21:59

## 2022-10-03 RX ADMIN — HEPARIN SODIUM 5000 UNIT(S): 5000 INJECTION INTRAVENOUS; SUBCUTANEOUS at 06:33

## 2022-10-03 RX ADMIN — HEPARIN SODIUM 5000 UNIT(S): 5000 INJECTION INTRAVENOUS; SUBCUTANEOUS at 17:39

## 2022-10-03 NOTE — PROGRESS NOTE ADULT - SUBJECTIVE AND OBJECTIVE BOX
[id number]    Subjective: 81 yo female seen and examined at bedside. No events overnight, patient with no complaints.    PAST MEDICAL & SURGICAL HISTORY:  Hearing loss  bilateral -- wears aids    OA (osteoarthritis)  first carpometacarpal joint of right hand    Overactive bladder    Fibroids  1990  hysterectomy    FAMILY HISTORY:  No pertinent family history in first degree relatives    Social Hx:  Nonsmoker, no drug or alcohol use    Home Medications:  atorvastatin 40 mg oral tablet: 1 tab(s) orally once a day (21 Sep 2022 17:51)  NexIUM: otc 1 tab daily (21 Sep 2022 17:51)  senna oral tablet: 2 tab(s) orally once a day (at bedtime)  discharge home with over the counter for constipation caused by pain meds  hold for loose BMs (21 Sep 2022 17:51)  VESIcare 5 mg oral tablet: 1 tab(s) orally once a day (21 Sep 2022 17:51)    MEDICATIONS  (STANDING):  atorvastatin 40 milliGRAM(s) Oral at bedtime  heparin   Injectable 5000 Unit(s) SubCutaneous every 12 hours  pantoprazole    Tablet 40 milliGRAM(s) Oral before breakfast  senna 2 Tablet(s) Oral at bedtime    MEDICATIONS  (PRN):  acetaminophen     Tablet .. 650 milliGRAM(s) Oral every 6 hours PRN Temp greater or equal to 38C (100.4F), Mild Pain (1 - 3)    Allergies  penicillin (Other)    ROS: Pertinent positives above, all other ROS were reviewed and are negative.      Vital Signs Last 24 Hrs  T(C): 36.6 (03 Oct 2022 12:50), Max: 36.6 (03 Oct 2022 12:50)  T(F): 97.8 (03 Oct 2022 12:50), Max: 97.8 (03 Oct 2022 12:50)  HR: 77 (03 Oct 2022 12:50) (77 - 87)  BP: 120/64 (03 Oct 2022 12:50) (111/65 - 142/73)  RR: 16 (03 Oct 2022 12:50) (16 - 18)  SpO2: 100% (03 Oct 2022 12:50) (99% - 100%)    Parameters below as of 03 Oct 2022 12:50  Patient On (Oxygen Delivery Method): room air    GENERAL EXAM:  Constitutional: Lying in bed, asleep. NAD.  Head: Normocephalic, atraumatic.  Extremities: No edema, no cyanosis.    NEUROLOGICAL EXAM:  MS: Sleeping, eyes closed. Eyes open to verbal/light tactile stimuli. Oriented to self only. States she is in [place]. 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 States year is 2014. Unable to state the president. Answers questions in short 2-4 word phrases, no spontaneous speech. Not slurring. Follows simple commands.  CN: L CN6 palsy. Face symmetric.   Motor: Lifts all extremities against gravity. ?weakness in LUE, however patient does not follow command to maintain UEs against gravity at requested angle/length of time.  Sensory: Intact to LT throughout.  Coordination/Gait: Not assessed.    Labs:   [id number] Chol 218<H> LDL -- HDL 60 Trig 149    Radiology:  -09/21 CTH: No acute intracranial hemorrhage or mass effect.  -10/02 MRI Head w/wo: No abnormal leptomeningeal enhancement. Unchanged imaging findings for which normal pressure hydrocephalus can be considered. Clinical correlation is required for this diagnosis. Evidence of superficial siderosis implying chronic and/or recurrent subarachnoid hemorrhage. Similar-appearing advanced chronic white matter microvascular type changes. Indeterminate extra-axial enhancing lesion off the right paramedian occipital convexity measuring 1.3 x 1.5 x 1.1 cm (AP x TRV x CC). There is mass effect upon the right cerebellar hemisphere without surrounding edema. The differential diagnosis includes but is not limited to a dural based metastatic lesion versus atypical meningioma. Serial contrast enhanced MR imaging over time can be obtained to assess for stability or change.

## 2022-10-03 NOTE — PROGRESS NOTE ADULT - ATTENDING COMMENTS
82y (1940) woman with a PMHx right femoral neck fracture and right hip hemiarthroplasty, HLD, NPH presents with AMS and recurrent falls.   At baseline, patient ambulates with walker and is oriented x2-3. On exam, Patient more awake today, eyes open, answering questions.     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 Burton to assess further management of NPH
Ms. Sawant is an 81 yo woman with encephalopathy, left CN VI of unclear etiology.   Infection/inflammation, hydrocephalus, toxic metabolic encephalopathy.   I agree with work up and management as above.     Thank you
Re-examination this AM:  Left CN VI palsy  Moving both arms - ?moving R slightly more than L.  B legs W/D to pain in toes.   reflexes absent except for B ankle reflexes.  B upgoing toes.     LP done - OP elevated at 21 cm H2O.  Traumatic LP  Xanthochromia represents intracranial bleeding.        A/P  Ms. 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  Uncertain if there has been any significant change in imaging compared to CT in 2/2022 but she now has significant worsening of mental status and left CN VI palsy which can be a false localizing sign of increased ICP.  Only slightly elevated opening pressure.   I would recommend neurosurgery consult but still more work up to be completed.   MRI brain w/wo gado, attn left CN VI, brainstem.  Further CSF analysis pending.     Thank you
acute on chronic encephalopathy - possibly due to toxic metabolic cause vs  normal pressure hydrocephalus vs inflammatory/neoplastic process, possibly underlying dementia process as patient's mental status is waxing/waning overall.    Recommendations:  - neurosx for MRI Head finding of possible dural-based metastatic lesion vs atypical meningioma.  - CT Chest/Abdomen/Pelvis, all with contrast, to r/o occult malignancy.  - follow up with her private neurologist, Dr. Burton, after discharge for further workup/management of possible NPH. out patient neuropsych evaluation  -PT/OT

## 2022-10-03 NOTE — PROGRESS NOTE ADULT - ASSESSMENT
Pt is an 83 yo F with PMH HLD, NPH, and OA p/w AMS and falls.  Pt is an 81 yo F with PMH HLD, NPH, and OA p/w AMS and falls thought to be d/t NPH. Neuro following. 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 Pending CT c/a/p for additional malignancy w/u.

## 2022-10-03 NOTE — PROGRESS NOTE ADULT - SUBJECTIVE AND OBJECTIVE BOX
[facility] Department of Hospital Medicine  Rosenda RiveraDO  Available on MS Teams  Pager: [phone]    Patient is a 82y old  Female who presents with a chief complaint of AMS (03 Oct 2022 12:56)    Subjective:  Pt seen and examined at bedside, sitting in chair with eyes clothes. Says she's tired and only slept part of the night. Denies being in any pain or needing anything.     REVIEW OF SYSTEMS:    CONSTITUTIONAL: No weakness, fevers or chills.   EYES/ENT: No visual changes;  No vertigo or throat pain   NECK: No pain or stiffness  RESPIRATORY: No cough, wheezing, hemoptysis; No shortness of breath  CARDIOVASCULAR: No chest pain or palpitations  GASTROINTESTINAL: No abdominal or epigastric pain. No nausea, vomiting, or hematemesis; No diarrhea or constipation. No melena or hematochezia.  GENITOURINARY: No dysuria, frequency or hematuria  NEUROLOGICAL: No numbness or weakness  SKIN: No itching, burning, rashes, or lesions   All other review of systems is negative unless indicated above.    VITAL SIGNS:  T(C): 36.6 (10-03-22 @ 12:50), Max: 36.6 (10-03-22 @ 12:50)  T(F): 97.8 (10-03-22 @ 12:50), Max: 97.8 (10-03-22 @ 12:50)  HR: 77 (10-03-22 @ 12:50) (77 - 87)  BP: 120/64 (10-03-22 @ 12:50) (119/89 - 142/73)  BP(mean): --  RR: 16 (10-03-22 @ 12:50) (16 - 18)  SpO2: 100% (10-03-22 @ 12:50) (99% - 100%)  Wt(kg): --    PHYSICAL EXAM:  Constitutional: elderly F resting comfortably in bedside chair; NAD; appears fatigued  Head: NC/AT  Eyes: PERRL, EOMI, anicteric sclera  ENT: no nasal discharge; MMM  Neck: supple; no JVD  Respiratory: CTA B/L; no W/R/R; on RA without respiratory distress  Cardiac: +S1/S2; RRR; no M/R/G  Gastrointestinal: soft, NT/ND; no rebound or guarding; +BSx4  Extremities: WWP, no clubbing or cyanosis; no peripheral edema  Musculoskeletal: NROM x4; no joint swelling, tenderness or erythema  Vascular: 2+ radial, DP/PT pulses B/L  Dermatologic: skin warm, dry and intact; no rashes, wounds, or scars  Neurologic: AAOx3; CN VI palsy L  Psychiatric: affect and characteristics of appearance, verbalizations, behaviors are appropriate    MEDICATIONS  (STANDING):  atorvastatin 40 milliGRAM(s) Oral at bedtime  heparin   Injectable 5000 Unit(s) SubCutaneous every 12 hours  pantoprazole    Tablet 40 milliGRAM(s) Oral before breakfast  senna 2 Tablet(s) Oral at bedtime    MEDICATIONS  (PRN):  acetaminophen     Tablet .. 650 milliGRAM(s) Oral every 6 hours PRN Temp greater or equal to 38C (100.4F), Mild Pain (1 - 3)    LABS:        No new labs.      CAPILLARY BLOOD GLUCOSE          RADIOLOGY & ADDITIONAL TESTS: Reviewed.

## 2022-10-03 NOTE — PROGRESS NOTE ADULT - ASSESSMENT
Assessment: 83 yo female with a PMHx of R femoral neck fracture (s/p R hip hemiarthroplasty), HLD, and concern for NPH who presented to [facility] on 9/21 for AMS. Son reported patient was lethargic for 2 days PTA. He also reported she has been having progressive decline with recurrent falls over last several months. At baseline, patient ambulates with a walker and is oriented x2-3. CT head no acute findings.     Impression: AMS possibly due to toxic metabolic cause vs communicating hydrocephalus vs inflammatory/neoplastic process. Likely some component of hospital-induced delirium and possibly underlying dementia process as patient's mental status is waxing/waning overall.    Recommendations:  [] Neuro checks per routine.  [x] MRI Head w/wo: results as above.  [] Would consult neurosx for MRI Head finding of possible dural-based metastatic lesion vs atypical meningioma.  [] Would consider CT Chest/Abdomen/Pelvis, all with contrast, to r/o occult malignancy.  [x] s/p LP, opening pressure 21. Xanthochromia present. CSF glucose 70, protein 47 <H>, TNC 1, RBC 2650, LDH 20. PCR panel neg. Gram stain neg. Cx NGTD. Cytopathology neg for malignant cells.  [] f/u remaining CSF studies: MBP, Paraneoplastic Panel, Flow Cytometry.  [x] Ammonia 17, B12 566, Folate 18.3, TSH 1.67.  [] PT/OT evals.  [] Measures to reduce delirium: frequent reorientation, maintain sleep/wake routine, avoid sleeping during the day, keep lights on during the day, use of hearing aids or glasses if patient needs them, regular toileting. Move patient to window bed if one becomes available.  [] Patient should follow up with her private neurologist, Dr. Burton, after discharge for further workup/management of possible NPH.    Case seen and discussed with neurology attending Dr. Varghese.   Assessment: 83 yo female with a PMHx of R femoral neck fracture (s/p R hip hemiarthroplasty), HLD, and concern for NPH who presented to [facility] on 9/21 for AMS. Son reported patient was lethargic for 2 days PTA. He also reported she has been having progressive decline with recurrent falls over last several months. At baseline, patient ambulates with a walker and is oriented x2-3. CT head no acute findings.     Impression: AMS possibly due to toxic metabolic cause vs communicating hydrocephalus vs inflammatory/neoplastic process. Likely some component of hospital-induced delirium and possibly underlying dementia process as patient's mental status is waxing/waning overall.    Recommendations:  [] Neuro checks per routine.  [x] MRI Head w/wo: results as above.  [] Would consult neurosx for MRI Head finding of possible dural-based metastatic lesion vs atypical meningioma.  [] Would consider CT Chest/Abdomen/Pelvis, all with contrast, to r/o occult malignancy.  [x] s/p LP, opening pressure 21. Xanthochromia present. CSF glucose 70, protein 47 <H>, TNC 1, RBC 2650, LDH 20. PCR panel neg. Gram stain neg. Cx NGTD. Cytopathology neg for malignant cells.  [] f/u remaining CSF studies: MBP, Paraneoplastic Panel, Flow Cytometry.  [x] Ammonia 17, B12 566, Folate 18.3, TSH 1.67.  [] PT/OT evals.  [] Measures to reduce delirium: frequent reorientation, maintain sleep/wake routine, avoid sleeping during the day, keep lights on during the day, use of hearing aids or glasses if patient needs them, regular toileting. Move patient to window bed if one becomes available.  [] Patient should follow up with her private neurologist, Dr. Burton, after discharge for further workup/management of possible NPH.  [] Neurology signing off. Please reconsult PRN or call [facility] [phone] with any questions.     Case seen and discussed with neurology attending Dr. Varghese.

## 2022-10-03 NOTE — PROGRESS NOTE ADULT - PROBLEM SELECTOR PLAN 1
- MR brain w/ w/o contrast with stable NPH, superficial siderosis c/f chronic or recurrent subarachnoid hemorrhage, extra-axial enhancing lesion R paramedian occipital convexity 1.3x1.5x1.1cm with mass effect in the R cerebellar hemisphere without edema c/f dural metastatic lesion vs. atypical meningioma   - neurosurgery consulted, f/u recs  - will obtain CT c/a/p with contrast to evaluate for malignancy  - pt family updated via telephone today (daughter Araceli Meneses)

## 2022-10-04 LAB
ANION GAP SERPL CALC-SCNC: 10 MMOL/L — SIGNIFICANT CHANGE UP (ref 7–14)
ANION GAP SERPL CALC-SCNC: 9 MMOL/L — SIGNIFICANT CHANGE UP (ref 7–14)
BASOPHILS # BLD AUTO: 0.05 K/UL — SIGNIFICANT CHANGE UP (ref 0–0.2)
BASOPHILS NFR BLD AUTO: 0.6 % — SIGNIFICANT CHANGE UP (ref 0–2)
BLD GP AB SCN SERPL QL: NEGATIVE — SIGNIFICANT CHANGE UP
BUN SERPL-MCNC: 28 MG/DL — HIGH (ref 7–23)
BUN SERPL-MCNC: 28 MG/DL — HIGH (ref 7–23)
CALCIUM SERPL-MCNC: 9 MG/DL — SIGNIFICANT CHANGE UP (ref 8.4–10.5)
CALCIUM SERPL-MCNC: 9.3 MG/DL — SIGNIFICANT CHANGE UP (ref 8.4–10.5)
CHLORIDE SERPL-SCNC: 100 MMOL/L — SIGNIFICANT CHANGE UP (ref 98–107)
CHLORIDE SERPL-SCNC: 103 MMOL/L — SIGNIFICANT CHANGE UP (ref 98–107)
CO2 SERPL-SCNC: 22 MMOL/L — SIGNIFICANT CHANGE UP (ref 22–31)
CO2 SERPL-SCNC: 29 MMOL/L — SIGNIFICANT CHANGE UP (ref 22–31)
CREAT SERPL-MCNC: 0.47 MG/DL — LOW (ref 0.5–1.3)
CREAT SERPL-MCNC: 0.62 MG/DL — SIGNIFICANT CHANGE UP (ref 0.5–1.3)
EGFR: 88 ML/MIN/1.73M2 — SIGNIFICANT CHANGE UP
EGFR: 94 ML/MIN/1.73M2 — SIGNIFICANT CHANGE UP
EOSINOPHIL # BLD AUTO: 0.15 K/UL — SIGNIFICANT CHANGE UP (ref 0–0.5)
EOSINOPHIL NFR BLD AUTO: 1.9 % — SIGNIFICANT CHANGE UP (ref 0–6)
GLUCOSE SERPL-MCNC: 105 MG/DL — HIGH (ref 70–99)
GLUCOSE SERPL-MCNC: 162 MG/DL — HIGH (ref 70–99)
HCT VFR BLD CALC: 47.9 % — HIGH (ref 34.5–45)
HGB BLD-MCNC: 16.1 G/DL — HIGH (ref 11.5–15.5)
IANC: 4.55 K/UL — SIGNIFICANT CHANGE UP (ref 1.8–7.4)
IMM GRANULOCYTES NFR BLD AUTO: 1 % — HIGH (ref 0–0.9)
LYMPHOCYTES # BLD AUTO: 2.38 K/UL — SIGNIFICANT CHANGE UP (ref 1–3.3)
LYMPHOCYTES # BLD AUTO: 30 % — SIGNIFICANT CHANGE UP (ref 13–44)
MAGNESIUM SERPL-MCNC: 2.3 MG/DL — SIGNIFICANT CHANGE UP (ref 1.6–2.6)
MAGNESIUM SERPL-MCNC: 2.3 MG/DL — SIGNIFICANT CHANGE UP (ref 1.6–2.6)
MCHC RBC-ENTMCNC: 29.5 PG — SIGNIFICANT CHANGE UP (ref 27–34)
MCHC RBC-ENTMCNC: 33.6 GM/DL — SIGNIFICANT CHANGE UP (ref 32–36)
MCV RBC AUTO: 87.9 FL — SIGNIFICANT CHANGE UP (ref 80–100)
MONOCYTES # BLD AUTO: 0.72 K/UL — SIGNIFICANT CHANGE UP (ref 0–0.9)
MONOCYTES NFR BLD AUTO: 9.1 % — SIGNIFICANT CHANGE UP (ref 2–14)
NEUTROPHILS # BLD AUTO: 4.55 K/UL — SIGNIFICANT CHANGE UP (ref 1.8–7.4)
NEUTROPHILS NFR BLD AUTO: 57.4 % — SIGNIFICANT CHANGE UP (ref 43–77)
NRBC # BLD: 0 /100 WBCS — SIGNIFICANT CHANGE UP (ref 0–0)
NRBC # FLD: 0 K/UL — SIGNIFICANT CHANGE UP (ref 0–0)
PHOSPHATE SERPL-MCNC: 4.5 MG/DL — SIGNIFICANT CHANGE UP (ref 2.5–4.5)
PHOSPHATE SERPL-MCNC: SIGNIFICANT CHANGE UP MG/DL (ref 2.5–4.5)
PLATELET # BLD AUTO: 189 K/UL — SIGNIFICANT CHANGE UP (ref 150–400)
POTASSIUM SERPL-MCNC: 4.3 MMOL/L — SIGNIFICANT CHANGE UP (ref 3.5–5.3)
POTASSIUM SERPL-MCNC: SIGNIFICANT CHANGE UP MMOL/L (ref 3.5–5.3)
POTASSIUM SERPL-SCNC: 4.3 MMOL/L — SIGNIFICANT CHANGE UP (ref 3.5–5.3)
POTASSIUM SERPL-SCNC: SIGNIFICANT CHANGE UP MMOL/L (ref 3.5–5.3)
RBC # BLD: 5.45 M/UL — HIGH (ref 3.8–5.2)
RBC # FLD: 13 % — SIGNIFICANT CHANGE UP (ref 10.3–14.5)
RH IG SCN BLD-IMP: POSITIVE — SIGNIFICANT CHANGE UP
SODIUM SERPL-SCNC: 132 MMOL/L — LOW (ref 135–145)
SODIUM SERPL-SCNC: 141 MMOL/L — SIGNIFICANT CHANGE UP (ref 135–145)
WBC # BLD: 7.93 K/UL — SIGNIFICANT CHANGE UP (ref 3.8–10.5)
WBC # FLD AUTO: 7.93 K/UL — SIGNIFICANT CHANGE UP (ref 3.8–10.5)

## 2022-10-04 PROCEDURE — 99233 SBSQ HOSP IP/OBS HIGH 50: CPT

## 2022-10-04 PROCEDURE — 71260 CT THORAX DX C+: CPT | Mod: 26

## 2022-10-04 PROCEDURE — 99221 1ST HOSP IP/OBS SF/LOW 40: CPT

## 2022-10-04 PROCEDURE — 99497 ADVNCD CARE PLAN 30 MIN: CPT

## 2022-10-04 PROCEDURE — 74177 CT ABD & PELVIS W/CONTRAST: CPT | Mod: 26

## 2022-10-04 RX ADMIN — ATORVASTATIN CALCIUM 40 MILLIGRAM(S): 80 TABLET, FILM COATED ORAL at 21:51

## 2022-10-04 RX ADMIN — HEPARIN SODIUM 5000 UNIT(S): 5000 INJECTION INTRAVENOUS; SUBCUTANEOUS at 05:43

## 2022-10-04 RX ADMIN — SENNA PLUS 2 TABLET(S): 8.6 TABLET ORAL at 21:49

## 2022-10-04 RX ADMIN — HEPARIN SODIUM 5000 UNIT(S): 5000 INJECTION INTRAVENOUS; SUBCUTANEOUS at 17:33

## 2022-10-04 NOTE — PROGRESS NOTE ADULT - PROBLEM SELECTOR PLAN 1
- MR brain w/ w/o contrast with stable NPH, superficial siderosis c/f chronic or recurrent subarachnoid hemorrhage, extra-axial enhancing lesion R paramedian occipital convexity 1.3x1.5x1.1cm with mass effect in the R cerebellar hemisphere without edema c/f dural metastatic lesion vs. atypical meningioma   - neurosurgery consulted, recommending outpt evaluation and possible biopsy  - CT c/a/p neg for abnormalities  - pt family updated via telephone today with grandchildren present (daughter Araceli Meneses) -- prefer to defer invasive measures / biopsies at this time

## 2022-10-04 NOTE — CHART NOTE - NSCHARTNOTESELECT_GEN_ALL_CORE
ACP NP/Event Note
Event Note
Event Note
Follow Up/Nutrition Services
Follow up/Nutrition Services
Nephrology

## 2022-10-04 NOTE — CONSULT NOTE ADULT - SUBJECTIVE AND OBJECTIVE BOX
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 Per son Logan at bedside patient was on her usual state of health (for most part-90% she is AO x3 at baseline) but yesterday there was some report from patient's HHA that she was kind of sleepy and less interactive. This behavior persisted throughout the day and was worse in the evening/night and she tried to get OOB on her own and then was found sitting on the floor so a fall was suspected. The day before yesterday she was outside getting some sun but did not take any water (apparently does not drink that much water at all). Pt has 24h care at home (private hire, 2 HHA for past several weeks). At baseline pt uses a walker at times and somewhat shuffles. Diagnosed with NPH in May 2022 and has seen a Neurologist at [facility] that obtained an MRI in the past. Son denies any recent sick contacts, recent change in meds, fever/chills. No SOB or cough per son. Appetite has been ok in general although pt does not eat much per grandson's report. No urinary symptoms that he knows off or GI issues. Patient has not complaint of anything in particular per son's report. Patient was kind of lethargic (but moving from side to side) during my visit so was unable to answer any of my questions. Patient was retaining urine in the ER so a Boudreaux was inserted.  History also obtained from patient's grandson that is an ACP at [employer].    VSS in ER  Due to agitation in ER patient received 1mg ativan and 2.5mg haldol (21 Sep 2022 17:34)    PAST MEDICAL & SURGICAL HISTORY:  Hearing loss  bilateral -- wears aids  OA (osteoarthritis)  first carpometacarpal joint of right hand  Overactive bladder  Fibroids -1990 hysterectomy    Allergies  penicillin (Other)    MEDICATIONS  (STANDING):  atorvastatin 40 milliGRAM(s) Oral at bedtime  heparin   Injectable 5000 Unit(s) SubCutaneous every 12 hours  pantoprazole    Tablet 40 milliGRAM(s) Oral before breakfast  senna 2 Tablet(s) Oral at bedtime    MEDICATIONS  (PRN):  acetaminophen     Tablet .. 650 milliGRAM(s) Oral every 6 hours PRN Temp greater or equal to 38C (100.4F), Mild Pain (1 - 3)    Vital Signs Last 24 Hrs  T(C): 36.3 (04 Oct 2022 05:06), Max: 36.6 (03 Oct 2022 12:50)  T(F): 97.4 (04 Oct 2022 05:06), Max: 97.8 (03 Oct 2022 12:50)  HR: 73 (04 Oct 2022 05:06) (73 - 89)  BP: 130/81 (04 Oct 2022 05:06) (120/64 - 136/73)  BP(mean): --  RR: 17 (04 Oct 2022 05:06) (16 - 17)  SpO2: 100% (04 Oct 2022 05:06) (100% - 100%)    Parameters below as of 04 Oct 2022 05:06  Patient On (Oxygen Delivery Method): room air        PE:  AA&0 x  Motor:  Sensory:      LABS:                        16.1   7.93  )-----------( 189      ( 04 Oct 2022 05:23 )             47.9     10-04    132<L>  |  100  |  28<H>  ----------------------------<  105<H>  TNP   |  22  |  0.47<L>    Ca    9.0      04 Oct 2022 05:23  Phos  TNP     10-04  Mg     2.30     10-04    RADIOLOGY:  < from: MR Head w/wo IV Cont (10.02.22 @ 14:56) >  INTERPRETATION:  .    CLINICAL INFORMATION: Admitted for altered mental status/encephalopathy   in the setting of NPH. Evaluate for evidence of leptomeningeal brainstem   enhancement.    TECHNIQUE: Multiplanar multisequential MRI of the brain was acquired with   and without the administration of IV gadolinium. 5.5 cc's of IV Gadavist   was administered for the purposes of this examination. 2 cc were   discarded.    COMPARISON: Prior head CT examination dated 9/21/2022.    FINDINGS: There is a 1.3 x 1.5 x 1.1 cm (AP x TRV x CC) extra-axial   enhancing mass in the right paramedian occipital location which   demonstrates bony inner table remodeling which appears somewhat   lobulated. Minimal adjacent mass effect is seen upon the right cerebellar   hemisphere without surrounding edema. This lesion is indeterminate.    Abnormal susceptibility artifact is seen layering within the occipital   horns. There is also hemosiderin staining outlining the folia of the   cerebellar hemispheres bilaterally.    Multiple patchy confluent nonspecific T2/FLAIR hyperintense signal   changes are noted throughout the bihemispheric white matter without   associated mass effect or restricted diffusion.    There is no evidence of acute ischemia on diffusion-weighted imaging.    No gross abnormal leptomeningeal enhancement is seen.    There is redemonstration of moderate ventriculomegaly which is out of   proportion to sulcal prominence. Variable sulcal prominence is also seen.   There is crowding of the cerebral sulci at the vertex. The callosal angle   appears sharp at the level of the posterior commissure measured in the   coronal plane.    No abnormalextra-axial fluid collections are noted. Flow-voids are noted   throughout the major intracranial vessels, on the T2 weighted images,   consistent with their patency. The sellar location appears unremarkable.    The paranasal sinuses and mastoid air cells are clear. The orbits appear   unremarkable.    IMPRESSION:    1. No abnormal leptomeningeal enhancement.    2. Unchanged imaging findings for which normal pressure hydrocephalus can   be considered. Clinical correlation is required for this diagnosis.    3. Evidence of superficial siderosis implying chronic and/or recurrent   subarachnoid hemorrhage    4. Similar-appearing advanced chronic white matter microvascular type   changes.    5. Indeterminate extra-axial enhancing lesion off the right paramedian   occipital convexity measuring 1.3 x 1.5 x 1.1 cm (AP x TRV x CC). There   is mass effect upon the right cerebellar hemisphere without surrounding   edema. The differential diagnosis includes but is not limited to a dural   based metastatic lesion versus atypical meningioma. Serial contrast   enhanced MR imaging over time can be obtained to assess for stability or   change.    < from: CT Head No Cont (09.21.22 @ 07:00) >    INTERPRETATION:  CT HEAD WITHOUT CONTRAST    INDICATION: 82 years old. Female. altered mental status.    COMPARISON: 2/21/2022.    TECHNIQUE: Noncontrast axial CT head was obtained from the skull base to   vertex.    FINDINGS:  No acute intracranial hemorrhage, mass effect or midline shift.  No CT evidence of acute large vascular territory infarct.  The ventricles and cortical sulci are prominent reflecting parenchymal   volume loss. Disproportionate ventriculomegaly, may be seen in normal   pressure hydrocephalus.  Patchy hypodensities in the periventricular white matter are nonspecific,   but likely sequela of small vessel ischemic disease.    The visualized paranasal sinuses and mastoid air cells are well aerated.   The right native ocular lens is surgically absent.  No displaced calvarial fracture.    IMPRESSION:  No acute intracranial hemorrhage or mass effect.                     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 Per son Logan at bedside patient was on her usual state of health (for most part-90% she is AO x3 at baseline) but yesterday there was some report from patient's HHA that she was kind of sleepy and less interactive. This behavior persisted throughout the day and was worse in the evening/night and she tried to get OOB on her own and then was found sitting on the floor so a fall was suspected. The day before yesterday she was outside getting some sun but did not take any water (apparently does not drink that much water at all). Pt has 24h care at home (private hire, 2 HHA for past several weeks). At baseline pt uses a walker at times and somewhat shuffles. Diagnosed with NPH in May 2022 and has seen a Neurologist at [facility] that obtained an MRI in the past. Son denies any recent sick contacts, recent change in meds, fever/chills. No SOB or cough per son. Appetite has been ok in general although pt does not eat much per grandson's report. No urinary symptoms that he knows off or GI issues. Patient has not complaint of anything in particular per son's report. Patient was kind of lethargic (but moving from side to side) during my visit so was unable to answer any of my questions. Patient was retaining urine in the ER so a Boudreaux was inserted.  History also obtained from patient's grandson that is an ACP at [employer].    VSS in ER  Due to agitation in ER patient received 1mg ativan and 2.5mg haldol (21 Sep 2022 17:34)    PAST MEDICAL & SURGICAL HISTORY:  Hearing loss  bilateral -- wears aids  OA (osteoarthritis)  first carpometacarpal joint of right hand  Overactive bladder  Fibroids -1990 hysterectomy    Allergies  penicillin (Other)    MEDICATIONS  (STANDING):  atorvastatin 40 milliGRAM(s) Oral at bedtime  heparin   Injectable 5000 Unit(s) SubCutaneous every 12 hours  pantoprazole    Tablet 40 milliGRAM(s) Oral before breakfast  senna 2 Tablet(s) Oral at bedtime    MEDICATIONS  (PRN):  acetaminophen     Tablet .. 650 milliGRAM(s) Oral every 6 hours PRN Temp greater or equal to 38C (100.4F), Mild Pain (1 - 3)    Vital Signs Last 24 Hrs  T(C): 36.3 (04 Oct 2022 05:06), Max: 36.6 (03 Oct 2022 12:50)  T(F): 97.4 (04 Oct 2022 05:06), Max: 97.8 (03 Oct 2022 12:50)  HR: 73 (04 Oct 2022 05:06) (73 - 89)  BP: 130/81 (04 Oct 2022 05:06) (120/64 - 136/73)  BP(mean): --  RR: 17 (04 Oct 2022 05:06) (16 - 17)  SpO2: 100% (04 Oct 2022 05:06) (100% - 100%)    Parameters below as of 04 Oct 2022 05:06  Patient On (Oxygen Delivery Method): room air        PE:  AA&0 x 2, follows simple commands, EOMI, PERRL, face symmetrical  Motor: PIERCE spontaneously, antigravity, with normal tone  Sensory: SILT  no drift      LABS:                        16.1   7.93  )-----------( 189      ( 04 Oct 2022 05:23 )             47.9     10-04    132<L>  |  100  |  28<H>  ----------------------------<  105<H>  TNP   |  22  |  0.47<L>    Ca    9.0      04 Oct 2022 05:23  Phos  TNP     10-04  Mg     2.30     10-04    RADIOLOGY:  < from: MR Head w/wo IV Cont (10.02.22 @ 14:56) >  INTERPRETATION:  .    CLINICAL INFORMATION: Admitted for altered mental status/encephalopathy   in the setting of NPH. Evaluate for evidence of leptomeningeal brainstem   enhancement.    TECHNIQUE: Multiplanar multisequential MRI of the brain was acquired with   and without the administration of IV gadolinium. 5.5 cc's of IV Gadavist   was administered for the purposes of this examination. 2 cc were   discarded.    COMPARISON: Prior head CT examination dated 9/21/2022.    FINDINGS: There is a 1.3 x 1.5 x 1.1 cm (AP x TRV x CC) extra-axial   enhancing mass in the right paramedian occipital location which   demonstrates bony inner table remodeling which appears somewhat   lobulated. Minimal adjacent mass effect is seen upon the right cerebellar   hemisphere without surrounding edema. This lesion is indeterminate.    Abnormal susceptibility artifact is seen layering within the occipital   horns. There is also hemosiderin staining outlining the folia of the   cerebellar hemispheres bilaterally.    Multiple patchy confluent nonspecific T2/FLAIR hyperintense signal   changes are noted throughout the bihemispheric white matter without   associated mass effect or restricted diffusion.    There is no evidence of acute ischemia on diffusion-weighted imaging.    No gross abnormal leptomeningeal enhancement is seen.    There is redemonstration of moderate ventriculomegaly which is out of   proportion to sulcal prominence. Variable sulcal prominence is also seen.   There is crowding of the cerebral sulci at the vertex. The callosal angle   appears sharp at the level of the posterior commissure measured in the   coronal plane.    No abnormalextra-axial fluid collections are noted. Flow-voids are noted   throughout the major intracranial vessels, on the T2 weighted images,   consistent with their patency. The sellar location appears unremarkable.    The paranasal sinuses and mastoid air cells are clear. The orbits appear   unremarkable.    IMPRESSION:    1. No abnormal leptomeningeal enhancement.    2. Unchanged imaging findings for which normal pressure hydrocephalus can   be considered. Clinical correlation is required for this diagnosis.    3. Evidence of superficial siderosis implying chronic and/or recurrent   subarachnoid hemorrhage    4. Similar-appearing advanced chronic white matter microvascular type   changes.    5. Indeterminate extra-axial enhancing lesion off the right paramedian   occipital convexity measuring 1.3 x 1.5 x 1.1 cm (AP x TRV x CC). There   is mass effect upon the right cerebellar hemisphere without surrounding   edema. The differential diagnosis includes but is not limited to a dural   based metastatic lesion versus atypical meningioma. Serial contrast   enhanced MR imaging over time can be obtained to assess for stability or   change.    < from: CT Head No Cont (09.21.22 @ 07:00) >    INTERPRETATION:  CT HEAD WITHOUT CONTRAST    INDICATION: 82 years old. Female. altered mental status.    COMPARISON: 2/21/2022.    TECHNIQUE: Noncontrast axial CT head was obtained from the skull base to   vertex.    FINDINGS:  No acute intracranial hemorrhage, mass effect or midline shift.  No CT evidence of acute large vascular territory infarct.  The ventricles and cortical sulci are prominent reflecting parenchymal   volume loss. Disproportionate ventriculomegaly, may be seen in normal   pressure hydrocephalus.  Patchy hypodensities in the periventricular white matter are nonspecific,   but likely sequela of small vessel ischemic disease.    The visualized paranasal sinuses and mastoid air cells are well aerated.   The right native ocular lens is surgically absent.  No displaced calvarial fracture.    IMPRESSION:  No acute intracranial hemorrhage or mass effect.

## 2022-10-04 NOTE — CONSULT NOTE ADULT - PROBLEM SELECTOR RECOMMENDATION 9
-no acute neurosurgical intervention  - patient may f/u with Dr. Siu as outpatient, call to schedule f/u appt. -no acute neurosurgical intervention  - patient may f/u with Dr. Siu as outpatient PRN

## 2022-10-04 NOTE — PROGRESS NOTE ADULT - PROBLEM SELECTOR PLAN 5
- F: none  - E: replete K<4, Mg<2  - N: regular diet  - D: hep 5000U TID sq  - G: protonix 40mg daily (home med nexium)    code: DNR/DNI  dispo: pending medical optimization, case discussed with family today via phone; PT recs LISSETH

## 2022-10-04 NOTE — CONSULT NOTE ADULT - ASSESSMENT
Patient ALEKSEY OLIVA is a 82y (1940) woman with a PMHx right femoral neck fracture and right hip hemiarthroplasty, HLD, NPH presents with AMS. Son at bedside reports patient has been lethargic for 2 days. HHA informed son patient was was not able to sleep on 9/19 and was sleeping all throughout the day. She was reported to be agitated by the HHA. He reports she has been having progressive decline with recurrent falls over last several months. Denied any seizure like activity, changes to vision, speech, dizziness.  At baseline, patient ambulates with walker and is oriented x2-3. Exam limited due to administration of ativan, able to move all extremities against gravity. CT head no acute findings.     Impression: AMS possibly due to Toxic metabolic cause       Recommendations:  [] CBC, CMP, Coag, Mag, phos. trend HB  [] BCx, syphillis screen  [] NH3, lactate, CK  [] Folate, B12, B1, B6  [] TSH, free t3  [] Obtain outpatient neurology records     Case to be seen and discussed with Neurology Attending. 
81 yo female with a PMHx of R femoral neck fracture (s/p R hip hemiarthroplasty), HLD, and concern for NPH who presented to [facility] on 9/21 for AMS. Son reported patient was lethargic for 2 days PTA. He also reported she has been having progressive decline with recurrent falls over last several months. At baseline, patient ambulates with a walker and is oriented x2-3. MRI Head shows  Indeterminate extra-axial enhancing lesion off the right paramedian occipital convexity measuring 1.3 x 1.5 x 1.1 cm (AP x TRV x CC). There is mass effect upon the right cerebellar hemisphere without surrounding edema. 
Pt. is an 82 y.o. 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 Nephrology consulted for Hyponatremia

## 2022-10-04 NOTE — PROGRESS NOTE ADULT - SUBJECTIVE AND OBJECTIVE BOX
[facility] Department of Hospital Medicine  Rosenda Rivera DO  Available on MS Teams  Pager: [phone]    Patient is a 82y old  Female who presents with a chief complaint of AMS (03 Oct 2022 12:56)    Subjective:  Pt seen and examined at bedside, sitting in bedside chair sleeping. Grandchildren bedside but pt minimally interactive. Pt required repeat prompting to participate in exam, barely opening eyes to speak to writer. Says she's not in any pain, she slept okay, and is eating okay. Says she does not need anything. Denies being sad. No other concerns or complaints.  Discussed with grandchildren and daughter (Araceli, via telephone) results of imaging and neurosurgery evaluation. Agree that unless significant change in outcome would result, would prefer to minimize invasive measures / biopsies / surgeries in favor of letting pt live in peace. More concerned regarding progression of debility and potential worsening of NPH at this time. Hopeful that she is able to improve and potentially pursue rehab to get stronger long-term.    REVIEW OF SYSTEMS:    CONSTITUTIONAL: No weakness, fevers or chills.   EYES/ENT: No visual changes;  No vertigo or throat pain   NECK: No pain or stiffness  RESPIRATORY: No cough, wheezing, hemoptysis; No shortness of breath  CARDIOVASCULAR: No chest pain or palpitations  GASTROINTESTINAL: No abdominal or epigastric pain. No nausea, vomiting, or hematemesis; No diarrhea or constipation. No melena or hematochezia.  GENITOURINARY: No dysuria, frequency or hematuria  NEUROLOGICAL: No numbness or weakness  SKIN: No itching, burning, rashes, or lesions   All other review of systems is negative unless indicated above.    Vital Signs Last 24 Hrs  T(C): 36.7 (04 Oct 2022 13:09), Max: 36.7 (04 Oct 2022 13:09)  T(F): 98.1 (04 Oct 2022 13:09), Max: 98.1 (04 Oct 2022 13:09)  HR: 96 (04 Oct 2022 13:09) (73 - 96)  BP: 107/66 (04 Oct 2022 13:09) (107/66 - 136/73)  BP(mean): --  RR: 16 (04 Oct 2022 13:09) (16 - 17)  SpO2: 98% (04 Oct 2022 13:09) (98% - 100%)    Parameters below as of 04 Oct 2022 13:09  Patient On (Oxygen Delivery Method): room air    PHYSICAL EXAM:  Constitutional: elderly frail F resting in bedside chair; NAD; appears fatigued, minimally participatory with exam  Head: NC/AT; refused to lift head / turn towards writer  Eyes: PERRL, anicteric sclera  ENT: no nasal discharge; MMM  Neck: supple; no JVD  Respiratory: CTA B/L; no W/R/R; on RA without respiratory distress  Cardiac: +S1/S2; RRR; no M/R/G  Gastrointestinal: soft, NT/ND; no rebound or guarding; +BSx4  Extremities: WWP, no clubbing or cyanosis; no peripheral edema  Musculoskeletal: NROM x4; no joint swelling, tenderness or erythema  Vascular: 2+ radial, DP/PT pulses B/L  Dermatologic: skin warm, dry and intact; no rashes, wounds, or scars  Neurologic: AAOx3; CN VI palsy L  Psychiatric: withdrawn    MEDICATIONS  (STANDING):  atorvastatin 40 milliGRAM(s) Oral at bedtime  heparin   Injectable 5000 Unit(s) SubCutaneous every 12 hours  pantoprazole    Tablet 40 milliGRAM(s) Oral before breakfast  senna 2 Tablet(s) Oral at bedtime    MEDICATIONS  (PRN):  acetaminophen     Tablet .. 650 milliGRAM(s) Oral every 6 hours PRN Temp greater or equal to 38C (100.4F), Mild Pain (1 - 3)    LABS:                        16.1   7.93  )-----------( 189      ( 04 Oct 2022 05:23 )             47.9     10-04    141  |  103  |  28<H>  ----------------------------<  162<H>  4.3   |  29  |  0.62    Ca    9.3      04 Oct 2022 10:05  Phos  4.5     10-04  Mg     2.30     10-04          CAPILLARY BLOOD GLUCOSE          RADIOLOGY & ADDITIONAL TESTS: Reviewed.

## 2022-10-04 NOTE — PROGRESS NOTE ADULT - ASSESSMENT
Pt is an 81 yo F with PMH HLD, NPH, and OA p/w AMS and falls thought to be d/t NPH. Neuro following. 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 CT c/a/p neg for any additional abnormalities.

## 2022-10-04 NOTE — CHART NOTE - NSCHARTNOTEFT_GEN_A_CORE
Source: PCA and Chart Review     Medical Course: Pt is an 81 yo F with PMH HLD, NPH, and OA p/w AMS and falls thought to be d/t NPH. Neuro following. 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 CT c/a/p neg for any additional abnormalities.      Nutrition Course: Patient was sleeping at the time of visit. Information obtained via PCA at the bedside, who reports patient had few bites of her eggs this morning for breakfast and that's all. Hormel Shake at bedside visibly seen with 50% intake. Encouraged PCA to offer nutritional supplement during the day. Per RN flowsheet, patient's PO intake shows improvement, able to consume 51-75% of her meals most of the time. PO continue supplemented with Hormel Shake 2x daily(1040 wallace, 44gm pro) and Magic Cup 1x daily (290kcal, 9gm pro). No chewing swallowing reported at this time. GI noted with fecal incontinence as per RN flowsheet. Plan for d/c in placed per CM. RD to remain available for further nutritional interventions as indicated.              GI: WDL. Last BM fecal incontinence     PO intake:  < 50% [x ] 50-75% [x ]       Anthropometrics: Height (cm): 162.6 (09-24)  Weight (kg): 54.431 (09-24)  BMI (kg/m2): 20.6 (09-24)    Edema: None reported at present.   Pressure Injuries: None reported at present.     __________________ Pertinent Medications__________________   MEDICATIONS  (STANDING):  atorvastatin 40 milliGRAM(s) Oral at bedtime  heparin   Injectable 5000 Unit(s) SubCutaneous every 12 hours  pantoprazole    Tablet 40 milliGRAM(s) Oral before breakfast  senna 2 Tablet(s) Oral at bedtime    MEDICATIONS  (PRN):  acetaminophen     Tablet .. 650 milliGRAM(s) Oral every 6 hours PRN Temp greater or equal to 38C (100.4F), Mild Pain (1 - 3)      __________________ Pertinent Labs__________________   10-04 Na141 mmol/L Glu 162 mg/dL<H> K+ 4.3 mmol/L Cr  0.62 mg/dL BUN 28 mg/dL<H> 10-04 Phos 4.5 mg/dL 09-26 Chol 218 mg/dL<H> LDL --    HDL 60 mg/dL Trig 149 mg/dL                Estimated Needs:   [x ] no change since previous assessment      Previous Nutrition Diagnosis: Malnutrition     Nutrition Diagnosis is [x ] ongoing        Recommendations:  1) Encourage PO intake and honor food preferences as able.   2) Continue to provide Hormel Shake 2x daily and Magic Cup 1x daily.   3) Continue to provide assistance at meal time as needed.   4) Monitor PO intake, Labs, weights, BMs, and skin integrity.    5) RD to remain available for further nutritional interventions as indicated.       Monitoring and Evaluation:      [ x] Tolerance to diet prescription [x ] weights [x ] follow up per protocol  [ ] other:

## 2022-10-05 PROCEDURE — 99232 SBSQ HOSP IP/OBS MODERATE 35: CPT

## 2022-10-05 RX ADMIN — HEPARIN SODIUM 5000 UNIT(S): 5000 INJECTION INTRAVENOUS; SUBCUTANEOUS at 06:10

## 2022-10-05 RX ADMIN — SENNA PLUS 2 TABLET(S): 8.6 TABLET ORAL at 21:35

## 2022-10-05 RX ADMIN — HEPARIN SODIUM 5000 UNIT(S): 5000 INJECTION INTRAVENOUS; SUBCUTANEOUS at 17:16

## 2022-10-05 RX ADMIN — ATORVASTATIN CALCIUM 40 MILLIGRAM(S): 80 TABLET, FILM COATED ORAL at 21:35

## 2022-10-05 RX ADMIN — PANTOPRAZOLE SODIUM 40 MILLIGRAM(S): 20 TABLET, DELAYED RELEASE ORAL at 06:08

## 2022-10-05 NOTE — PROGRESS NOTE ADULT - PROBLEM SELECTOR PLAN 5
- F: none  - E: replete K<4, Mg<2  - N: regular diet  - D: hep 5000U TID sq  - G: protonix 40mg daily (home med nexium)    code: DNR/DNI  dispo: pending medical optimization; PT recs [facility] -- will attempt to call family again tomorrow, unable to make contact today

## 2022-10-05 NOTE — PROGRESS NOTE ADULT - ASSESSMENT
Pt is an 83 yo F with PMH HLD, NPH, and OA p/w AMS and falls thought to be d/t NPH. Neuro following. 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 CT c/a/p neg for any additional abnormalities.

## 2022-10-05 NOTE — PROGRESS NOTE ADULT - SUBJECTIVE AND OBJECTIVE BOX
[facility] Department of Hospital Medicine  Rosenda Rivera DO  Available on MS Teams  Pager: [phone]    Patient is a 82y old  Female who presents with a chief complaint of AMS (03 Oct 2022 12:56)    Subjective:  Pt seen and examined at bedside, sleeping in bed. Not wanting to talk or participate with exam. Says she is "ok" and "tired". Unable to reach family today, likely due to [employer]. Will reach out again tomorrow.     REVIEW OF SYSTEMS:    CONSTITUTIONAL: No weakness, fevers or chills.   EYES/ENT: No visual changes;  No vertigo or throat pain   NECK: No pain or stiffness  RESPIRATORY: No cough, wheezing, hemoptysis; No shortness of breath  CARDIOVASCULAR: No chest pain or palpitations  GASTROINTESTINAL: No abdominal or epigastric pain. No nausea, vomiting, or hematemesis; No diarrhea or constipation. No melena or hematochezia.  GENITOURINARY: No dysuria, frequency or hematuria  NEUROLOGICAL: No numbness or weakness  SKIN: No itching, burning, rashes, or lesions   All other review of systems is negative unless indicated above.    Vital Signs Last 24 Hrs  T(C): 36.8 (05 Oct 2022 13:45), Max: 36.8 (04 Oct 2022 21:40)  T(F): 98.2 (05 Oct 2022 13:45), Max: 98.3 (04 Oct 2022 21:40)  HR: 92 (05 Oct 2022 13:45) (84 - 96)  BP: 103/52 (05 Oct 2022 13:45) (103/52 - 125/84)  BP(mean): --  RR: 18 (05 Oct 2022 13:45) (17 - 18)  SpO2: 99% (05 Oct 2022 13:45) (95% - 99%)    Parameters below as of 05 Oct 2022 13:45  Patient On (Oxygen Delivery Method): room air    PHYSICAL EXAM:  Constitutional: elderly frail F sleeping; NAD; appears fatigued, minimally participatory with exam  Head: NC/AT; refused to lift head / turn towards writer  Eyes: PERRL, anicteric sclera  ENT: no nasal discharge; MMM  Neck: supple; no JVD  Respiratory: CTA B/L; no W/R/R; on RA without respiratory distress  Cardiac: +S1/S2; RRR; no M/R/G  Gastrointestinal: soft, NT/ND; no rebound or guarding; +BSx4  Extremities: WWP, no clubbing or cyanosis; no peripheral edema  Musculoskeletal: NROM x4; no joint swelling, tenderness or erythema  Vascular: 2+ radial, DP/PT pulses B/L  Dermatologic: skin warm, dry and intact; no rashes, wounds, or scars  Neurologic: AAOx3; CN VI palsy L  Psychiatric: withdrawn    MEDICATIONS  (STANDING):  atorvastatin 40 milliGRAM(s) Oral at bedtime  heparin   Injectable 5000 Unit(s) SubCutaneous every 12 hours  pantoprazole    Tablet 40 milliGRAM(s) Oral before breakfast  senna 2 Tablet(s) Oral at bedtime    MEDICATIONS  (PRN):  acetaminophen     Tablet .. 650 milliGRAM(s) Oral every 6 hours PRN Temp greater or equal to 38C (100.4F), Mild Pain (1 - 3)    LABS:                        16.1   7.93  )-----------( 189      ( 04 Oct 2022 05:23 )             47.9     10-04    141  |  103  |  28<H>  ----------------------------<  162<H>  4.3   |  29  |  0.62    Ca    9.3      04 Oct 2022 10:05  Phos  4.5     10-04  Mg     2.30     10-04          CAPILLARY BLOOD GLUCOSE          RADIOLOGY & ADDITIONAL TESTS: Reviewed.

## 2022-10-06 LAB — SARS-COV-2 RNA SPEC QL NAA+PROBE: SIGNIFICANT CHANGE UP

## 2022-10-06 PROCEDURE — 99232 SBSQ HOSP IP/OBS MODERATE 35: CPT

## 2022-10-06 RX ORDER — MIRTAZAPINE 45 MG/1
7.5 TABLET, ORALLY DISINTEGRATING ORAL AT BEDTIME
Refills: 0 | Status: DISCONTINUED | OUTPATIENT
Start: 2022-10-06 | End: 2022-10-07

## 2022-10-06 RX ADMIN — HEPARIN SODIUM 5000 UNIT(S): 5000 INJECTION INTRAVENOUS; SUBCUTANEOUS at 17:20

## 2022-10-06 RX ADMIN — PANTOPRAZOLE SODIUM 40 MILLIGRAM(S): 20 TABLET, DELAYED RELEASE ORAL at 05:56

## 2022-10-06 RX ADMIN — HEPARIN SODIUM 5000 UNIT(S): 5000 INJECTION INTRAVENOUS; SUBCUTANEOUS at 05:56

## 2022-10-06 RX ADMIN — ATORVASTATIN CALCIUM 40 MILLIGRAM(S): 80 TABLET, FILM COATED ORAL at 22:07

## 2022-10-06 RX ADMIN — SENNA PLUS 2 TABLET(S): 8.6 TABLET ORAL at 22:07

## 2022-10-06 RX ADMIN — MIRTAZAPINE 7.5 MILLIGRAM(S): 45 TABLET, ORALLY DISINTEGRATING ORAL at 22:09

## 2022-10-06 NOTE — PROGRESS NOTE ADULT - PROBLEM SELECTOR PLAN 1
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

## 2022-10-06 NOTE — PROGRESS NOTE ADULT - PROBLEM SELECTOR PLAN 5
- F: none  - E: replete K<4, Mg<2  - N: regular diet  - D: hep 5000U TID sq  - G: protonix 40mg daily (home med nexium)    code: DNR/DNI  dispo: pending medical optimization; PT recs [facility] -- discussed with family (daughter, Araceli) today, requesting final neuro recs and DC to [facility] tomorrow

## 2022-10-06 NOTE — PROGRESS NOTE ADULT - ASSESSMENT
Pt is an 81 yo F with PMH HLD, NPH, and OA p/w AMS and falls thought to be d/t NPH. Neuro following. 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 CT c/a/p neg for any additional abnormalities. Pending final neuro recs prior to DC to [facility].

## 2022-10-06 NOTE — PROGRESS NOTE ADULT - SUBJECTIVE AND OBJECTIVE BOX
[facility] Department of Hospital Medicine  Rosenda DO Miguel  Available on MS Teams  Pager: [phone]    Patient is a 82y old  Female who presents with a chief complaint of AMS (03 Oct 2022 12:56)    Subjective:  Pt seen and examined at bedside, sitting in bedside chair. First time awake today, eyes open and participating in conversation. Says she slept better last night and feels okay. Wants to know when she's "getting out of here". No concerns or complaints.     REVIEW OF SYSTEMS:    CONSTITUTIONAL: No weakness, fevers or chills.   EYES/ENT: No visual changes;  No vertigo or throat pain   NECK: No pain or stiffness  RESPIRATORY: No cough, wheezing, hemoptysis; No shortness of breath  CARDIOVASCULAR: No chest pain or palpitations  GASTROINTESTINAL: No abdominal or epigastric pain. No nausea, vomiting, or hematemesis; No diarrhea or constipation. No melena or hematochezia.  GENITOURINARY: No dysuria, frequency or hematuria  NEUROLOGICAL: No numbness or weakness  SKIN: No itching, burning, rashes, or lesions   All other review of systems is negative unless indicated above.    Vital Signs Last 24 Hrs  T(C): 36.3 (06 Oct 2022 05:50), Max: 36.5 (05 Oct 2022 21:30)  T(F): 97.4 (06 Oct 2022 05:50), Max: 97.7 (05 Oct 2022 21:30)  HR: 83 (06 Oct 2022 05:50) (83 - 88)  BP: 115/84 (06 Oct 2022 05:50) (115/84 - 122/71)  BP(mean): --  RR: 18 (06 Oct 2022 05:50) (17 - 18)  SpO2: 97% (06 Oct 2022 05:50) (96% - 97%)    Parameters below as of 06 Oct 2022 05:50  Patient On (Oxygen Delivery Method): room air    PHYSICAL EXAM:  Constitutional: elderly frail F; sitting comfortably in bedside chair, awake  Head: NC/AT; independently holding head up, opening eyes and looking around  Eyes: PERRL, EOMI, anicteric sclera  ENT: no nasal discharge; MMM  Neck: supple; no JVD  Respiratory: CTA B/L; no W/R/R; on RA without respiratory distress  Cardiac: +S1/S2; RRR; no M/R/G  Gastrointestinal: soft, NT/ND; no rebound or guarding; +BSx4  Extremities: WWP, no clubbing or cyanosis; no peripheral edema  Musculoskeletal: NROM x4; no joint swelling, tenderness or erythema  Vascular: 2+ radial, DP/PT pulses B/L  Dermatologic: skin warm, dry and intact; no rashes, wounds, or scars  Neurologic: AAOx3; CN VI palsy L; moves all extremities spontaneously     MEDICATIONS  (STANDING):  atorvastatin 40 milliGRAM(s) Oral at bedtime  heparin   Injectable 5000 Unit(s) SubCutaneous every 12 hours  mirtazapine 7.5 milliGRAM(s) Oral at bedtime  pantoprazole    Tablet 40 milliGRAM(s) Oral before breakfast  senna 2 Tablet(s) Oral at bedtime    MEDICATIONS  (PRN):  acetaminophen     Tablet .. 650 milliGRAM(s) Oral every 6 hours PRN Temp greater or equal to 38C (100.4F), Mild Pain (1 - 3)    LABS:      No labs today.        CAPILLARY BLOOD GLUCOSE          RADIOLOGY & ADDITIONAL TESTS: Reviewed.

## 2022-10-07 ENCOUNTER — TRANSCRIPTION ENCOUNTER (OUTPATIENT)
Age: 82
End: 2022-10-07

## 2022-10-07 VITALS
DIASTOLIC BLOOD PRESSURE: 62 MMHG | TEMPERATURE: 98 F | SYSTOLIC BLOOD PRESSURE: 120 MMHG | OXYGEN SATURATION: 99 % | RESPIRATION RATE: 18 BRPM | HEART RATE: 76 BPM

## 2022-10-07 LAB
AMPHIPHYSIN AB TITR CSF: NEGATIVE TITER — SIGNIFICANT CHANGE UP
CV2 IGG TITR CSF: NEGATIVE TITER — SIGNIFICANT CHANGE UP
GLIAL NUC TYPE 1 AB TITR CSF: NEGATIVE TITER — SIGNIFICANT CHANGE UP
HU1 AB TITR CSF IF: NEGATIVE TITER — SIGNIFICANT CHANGE UP
HU2 AB TITR CSF IF: NEGATIVE TITER — SIGNIFICANT CHANGE UP
HU3 AB TITR CSF: NEGATIVE TITER — SIGNIFICANT CHANGE UP
PARANEOPLASTIC INTERPRETATION, CSF: SIGNIFICANT CHANGE UP
PCA-TR AB TITR CSF: NEGATIVE TITER — SIGNIFICANT CHANGE UP
PURKINJE CELL CYTOPLASMIC AB TYPE 2: NEGATIVE TITER — SIGNIFICANT CHANGE UP
PURKINJE CELLS AB TITR CSF IF: NEGATIVE TITER — SIGNIFICANT CHANGE UP
REFLEX ADDED: SIGNIFICANT CHANGE UP

## 2022-10-07 PROCEDURE — 99239 HOSP IP/OBS DSCHRG MGMT >30: CPT

## 2022-10-07 RX ORDER — MIRTAZAPINE 45 MG/1
1 TABLET, ORALLY DISINTEGRATING ORAL
Qty: 0 | Refills: 0 | DISCHARGE
Start: 2022-10-07

## 2022-10-07 RX ADMIN — HEPARIN SODIUM 5000 UNIT(S): 5000 INJECTION INTRAVENOUS; SUBCUTANEOUS at 06:06

## 2022-10-07 RX ADMIN — PANTOPRAZOLE SODIUM 40 MILLIGRAM(S): 20 TABLET, DELAYED RELEASE ORAL at 06:06

## 2022-10-07 NOTE — PROGRESS NOTE ADULT - NUTRITIONAL ASSESSMENT
This patient has been assessed with a concern for Malnutrition and has been determined to have a diagnosis/diagnoses of Severe protein-calorie malnutrition.    This patient is being managed with:   Diet Easy to Chew-  Kosher  Entered: Oct  3 2022  6:58PM    
This patient has been assessed with a concern for Malnutrition and has been determined to have a diagnosis/diagnoses of Severe protein-calorie malnutrition.    This patient is being managed with:   Diet Minced and Moist-  Kosher  Entered: Oct  2 2022  2:15PM    
This patient has been assessed with a concern for Malnutrition and has been determined to have a diagnosis/diagnoses of Severe protein-calorie malnutrition.    This patient is being managed with:   Diet Easy to Chew-  Kosher  Entered: Oct  3 2022  6:58PM    
This patient has been assessed with a concern for Malnutrition and has been determined to have a diagnosis/diagnoses of Severe protein-calorie malnutrition.    This patient is being managed with:   Diet Regular-  Pureed (PUREED)  Kosher  Entered: Sep 24 2022  9:03AM    
This patient has been assessed with a concern for Malnutrition and has been determined to have a diagnosis/diagnoses of Severe protein-calorie malnutrition.    This patient is being managed with:   Diet NPO-  Entered: Sep 21 2022  5:58PM    
This patient has been assessed with a concern for Malnutrition and has been determined to have a diagnosis/diagnoses of Severe protein-calorie malnutrition.    This patient is being managed with:   Diet Regular-  Pureed (PUREED)  William  Entered: Sep 27 2022  2:22PM    
This patient has been assessed with a concern for Malnutrition and has been determined to have a diagnosis/diagnoses of Severe protein-calorie malnutrition.    This patient is being managed with:   Diet Easy to Chew-  Kosher  Entered: Oct  3 2022  6:58PM    
This patient has been assessed with a concern for Malnutrition and has been determined to have a diagnosis/diagnoses of Severe protein-calorie malnutrition.    This patient is being managed with:   Diet Easy to Chew-  Kosher  Entered: Oct  3 2022  6:58PM    
This patient has been assessed with a concern for Malnutrition and has been determined to have a diagnosis/diagnoses of Severe protein-calorie malnutrition.    This patient is being managed with:   Diet Regular-  Pureed (PUREED)  William  Entered: Sep 27 2022  2:22PM    
This patient has been assessed with a concern for Malnutrition and has been determined to have a diagnosis/diagnoses of Severe protein-calorie malnutrition.    This patient is being managed with:   Diet Regular-  Pureed (PUREED)  Kosher  Supplement Feeding Modality:  Oral  Ensure Enlive Cans or Servings Per Day:  1       Frequency:  Three Times a day  Entered: Sep 26 2022  3:10PM    
This patient has been assessed with a concern for Malnutrition and has been determined to have a diagnosis/diagnoses of Severe protein-calorie malnutrition.    This patient is being managed with:   Diet Regular-  Pureed (PUREED)  Kosher  Entered: Sep 24 2022  9:03AM    
This patient has been assessed with a concern for Malnutrition and has been determined to have a diagnosis/diagnoses of Severe protein-calorie malnutrition.    This patient is being managed with:   Diet Regular-  Pureed (PUREED)  William  Entered: Sep 27 2022  2:22PM    
This patient has been assessed with a concern for Malnutrition and has been determined to have a diagnosis/diagnoses of Severe protein-calorie malnutrition.    This patient is being managed with:   Diet Regular-  Pureed (PUREED)  Kosher  Entered: Sep 24 2022  9:03AM

## 2022-10-07 NOTE — PROGRESS NOTE ADULT - PROBLEM SELECTOR PROBLEM 2
Hyponatremia
NPH (normal pressure hydrocephalus)
Other encephalopathy
NPH (normal pressure hydrocephalus)
Other encephalopathy
Other encephalopathy
NPH (normal pressure hydrocephalus)
Other encephalopathy
NPH (normal pressure hydrocephalus)
NPH (normal pressure hydrocephalus)
Other encephalopathy

## 2022-10-07 NOTE — DISCHARGE NOTE NURSING/CASE MANAGEMENT/SOCIAL WORK - PATIENT PORTAL LINK FT
You can access the FollowMyHealth Patient Portal offered by [facility] by registering at the following website: [email or web address]. By joining [facility]’s FollowMyHealth portal, you will also be able to view your health information using other applications (apps) compatible with our system.

## 2022-10-07 NOTE — PROGRESS NOTE ADULT - PROBLEM SELECTOR PROBLEM 5
Arthritis
Hyperlipidemia
Nutrition, metabolism, and development symptoms
Arthritis
Nutrition, metabolism, and development symptoms
Arthritis
Nutrition, metabolism, and development symptoms
Arthritis

## 2022-10-07 NOTE — PROGRESS NOTE ADULT - PROBLEM SELECTOR PLAN 5
- F: none  - E: replete K<4, Mg<2  - N: regular diet  - D: hep 5000U TID sq  - G: protonix 40mg daily (home med nexium)    code: DNR/DNI  dispo: PT nakul MONTANEZ -- daughter, Araceli, contacted via phone today and in agreement for DC to [facility] today 10/7

## 2022-10-07 NOTE — PROGRESS NOTE ADULT - PROBLEM SELECTOR PROBLEM 3
Gait instability
Hyponatremia
Gait instability
Hyponatremia
Gait instability
Hyponatremia
Gait instability
Hyponatremia
Hyponatremia
Gait instability
Hyponatremia
Urinary retention

## 2022-10-07 NOTE — DISCHARGE NOTE NURSING/CASE MANAGEMENT/SOCIAL WORK - NSDCFUADDAPPT_GEN_ALL_CORE_FT
Follow up with your primary care physician for further monitoring in 1-2 weeks. Please call to arrange appointment.  Follow up with neurology, Dr. Burton within 1-2 weeks of discharge.

## 2022-10-07 NOTE — PROGRESS NOTE ADULT - PROBLEM SELECTOR PROBLEM 1
Brain mass
NPH (normal pressure hydrocephalus)
NPH (normal pressure hydrocephalus)
Brain mass
Brain mass
NPH (normal pressure hydrocephalus)
Brain mass
NPH (normal pressure hydrocephalus)
NPH (normal pressure hydrocephalus)
Brain mass
NPH (normal pressure hydrocephalus)
Other encephalopathy
NPH (normal pressure hydrocephalus)

## 2022-10-07 NOTE — DISCHARGE NOTE NURSING/CASE MANAGEMENT/SOCIAL WORK - NSDCPEFALRISK_GEN_ALL_CORE
For information on Fall & Injury Prevention, visit: [email or web address] OR  [email or web address] OR  https://www.cdc.gov/steadi/patient.html

## 2022-10-07 NOTE — PROGRESS NOTE ADULT - PROBLEM SELECTOR PLAN 3
resolved
Pt takes vesicare for overactive bladder now with urinary retention  No e/o UTI on UA  - Keep Boudreaux in for now  - TOV once pt is more awake    #Erythrocytosis  - did not improve w/ IVF, not hypoxic, hemoconcentration vs polycythemia vera   - EPO level ordered
resolved
- as above, p/w fall  - PT recs [facility]   - fall precautions
resolved
- as above, p/w fall  - PT recs [facility]   - fall precautions
- as above, p/w fall  - PT recs [facility]   - fall precautions
resolved
patient w/ hyponatremia   - urine Na 163, urine , normal BUN, on my assessment would be more consistent w/ SIADH vs tea-and toast   - nephrology consulted --> hold further IVF and replete lytes, no need for diuretics at this time   - repeat Na 132 will continue to monitor   - unlikely cardiac given normal BNP for age, trops flat, EKG negative t-waves V1 and V2. T wave flattening AVL. Unknown baseline. ECHO ordered. Monitor on tele given EKG changes w/o baseline. If ECHO unremarkable will d'c tele. Urine Na also >20, inconsistent w/ cardiac causes for hyponatremia.
- as above, p/w fall  - PT recs [facility]   - fall precautions
resolved
- as above, p/w fall  - PT recs [facility]   - fall precautions
resolved

## 2022-10-07 NOTE — PROGRESS NOTE ADULT - PROBLEM SELECTOR PROBLEM 4
Hyperlipidemia
Urinary retention
Hyperlipidemia
Urinary retention
Urinary retention
Hyperlipidemia
Hyperlipidemia
Urinary retention
Urinary retention
Arthritis
Urinary retention
Hyperlipidemia
Urinary retention

## 2022-10-07 NOTE — PROGRESS NOTE ADULT - PROVIDER SPECIALTY LIST ADULT
Hospitalist
Neurology
Hospitalist
Neurology
Hospitalist
Neurology
Neurology
Hospitalist

## 2022-10-07 NOTE — PROGRESS NOTE ADULT - ASSESSMENT
Pt is an 83 yo F with PMH HLD, NPH, and OA p/w AMS and falls thought to be d/t NPH. Neuro following. 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 CT c/a/p neg for any additional abnormalities. Medically ready for DC to [facility] with outpt neuro f/u.

## 2022-10-07 NOTE — PROGRESS NOTE ADULT - SUBJECTIVE AND OBJECTIVE BOX
[facility] Medicine  Rosenda Rivera DO  Available on MS Teams  Pager: [phone]    Patient is a 82y old  Female who presents with a chief complaint of AMS (03 Oct 2022 12:56)    Subjective:  Pt seen and examined at bedside, sitting in bedside chair. Awake and simply conversant as yesterday. Knows she is going to be discharged today. No complaints.    REVIEW OF SYSTEMS:    CONSTITUTIONAL: No weakness, fevers or chills.   EYES/ENT: No visual changes;  No vertigo or throat pain   NECK: No pain or stiffness  RESPIRATORY: No cough, wheezing, hemoptysis; No shortness of breath  CARDIOVASCULAR: No chest pain or palpitations  GASTROINTESTINAL: No abdominal or epigastric pain. No nausea, vomiting, or hematemesis; No diarrhea or constipation. No melena or hematochezia.  GENITOURINARY: No dysuria, frequency or hematuria  NEUROLOGICAL: No numbness or weakness  SKIN: No itching, burning, rashes, or lesions   All other review of systems is negative unless indicated above.    Vital Signs Last 24 Hrs  T(C): 36.8 (07 Oct 2022 07:09), Max: 36.8 (06 Oct 2022 14:16)  T(F): 98.2 (07 Oct 2022 07:09), Max: 98.2 (06 Oct 2022 14:16)  HR: 81 (07 Oct 2022 07:09) (81 - 86)  BP: 118/71 (07 Oct 2022 07:09) (95/61 - 118/71)  BP(mean): --  RR: 18 (07 Oct 2022 07:09) (18 - 18)  SpO2: 99% (07 Oct 2022 07:09) (98% - 99%)    Parameters below as of 07 Oct 2022 07:09  Patient On (Oxygen Delivery Method): room air    PHYSICAL EXAM:  Constitutional: elderly frail F; sitting comfortably in bedside chair, awake  Head: NC/AT; independently holding head up, opening eyes and looking around  Eyes: PERRL, EOMI, anicteric sclera  ENT: no nasal discharge; MMM  Neck: supple; no JVD  Respiratory: CTA B/L; no W/R/R; on RA without respiratory distress  Cardiac: +S1/S2; RRR; no M/R/G  Gastrointestinal: soft, NT/ND; no rebound or guarding; +BSx4  Extremities: WWP, no clubbing or cyanosis; no peripheral edema  Musculoskeletal: NROM x4; no joint swelling, tenderness or erythema  Vascular: 2+ radial, DP/PT pulses B/L  Dermatologic: skin warm, dry and intact; no rashes, wounds, or scars  Neurologic: AAOx3; CN VI palsy L; moves all extremities spontaneously     MEDICATIONS  (STANDING):  atorvastatin 40 milliGRAM(s) Oral at bedtime  heparin   Injectable 5000 Unit(s) SubCutaneous every 12 hours  mirtazapine 7.5 milliGRAM(s) Oral at bedtime  pantoprazole    Tablet 40 milliGRAM(s) Oral before breakfast  senna 2 Tablet(s) Oral at bedtime    MEDICATIONS  (PRN):  acetaminophen     Tablet .. 650 milliGRAM(s) Oral every 6 hours PRN Temp greater or equal to 38C (100.4F), Mild Pain (1 - 3)    LABS:      No labs today.        CAPILLARY BLOOD GLUCOSE          RADIOLOGY & ADDITIONAL TESTS: Reviewed.

## 2022-10-07 NOTE — DISCHARGE NOTE NURSING/CASE MANAGEMENT/SOCIAL WORK - NSDCVIVACCINE_GEN_ALL_CORE_FT
Tdap; 17-Oct-2020 17:44; Baudilio Solitario (RN); [employer]; [id number] (Exp. Date: 31-Jul-2022); IntraMuscular; Deltoid Left.; 0.5 milliLiter(s); VIS (VIS Published: 09-May-2013, VIS Presented: 17-Oct-2020);

## 2022-10-07 NOTE — PROGRESS NOTE ADULT - PROBLEM SELECTOR PLAN 2
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
plan as above
patient w/ hyponatremia   - urine Na 163, urine , normal BUN, on my assessment would be more consistent w/ SIADH vs tea-and toast   - nephrology consulted --> hold further IVF and replete lytes, no need for diuretics at this time   - repeat Na 131 will continue to monitor   - unlikely cardiac given normal BNP for age, trops flat, EKG negative t-waves V1 and V2. T wave flattening AVL. Unknown baseline. ECHO ordered. Monitor on tele given concern for + JVD on exam, and EKG changes w/o baseline. If ECHO unremarkable will d'c tele. Urine Na also >20.
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
plan as above
plan as above
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
plan as above
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
plan as above

## 2023-07-11 NOTE — DISCHARGE NOTE PROVIDER - ATTENDING DISCHARGE PHYSICAL EXAM:
----- Message from Gus De Jesus MD sent at 7/11/2023  8:54 AM CDT -----  Call patient, mammogram is okay  
Spoke to patient and conveyed result.  Patient has no further questions.  
Attending Discharge Physical Examination:

## 2023-10-17 NOTE — CHART NOTE - NSCHARTNOTEFT_GEN_A_CORE
NeuroSx cs called, pt will be seen on 10/4/22. Staging Info: By selecting yes to the question above you will include information on AJCC 8 tumor staging in your Mohs note. Information on tumor staging will be automatically added for SCCs on the head and neck. AJCC 8 includes tumor size, tumor depth, perineural involvement and bone invasion.

## 2025-01-03 NOTE — PRE-OP CHECKLIST - TYPE OF SOLUTION
Anemia is likely due to chronic disease due to ESRD. Most recent hemoglobin and hematocrit are listed below.  Recent Labs     01/01/25  0233 01/03/25  0811   HGB 8.3* 7.6*   HCT 26.1* 24.4*       Plan  - Monitor serial CBC: Daily  - Transfuse PRBC if patient becomes hemodynamically unstable, symptomatic or H/H drops below 7/21.  - Patient will receive 1 unit PRBCs today  - Patient's anemia is currently worsening   NS

## 2025-07-09 NOTE — PHYSICAL THERAPY INITIAL EVALUATION ADULT - ASSISTIVE DEVICE FOR TRANSFER: GAIT, REHAB EVAL
The patient’s blood pressure is under good control. No adverse reaction to current medications. Encourage salt restriction, weight reduction and regular CV exercise. CPM pending labs and adjust accordingly. Further evaluation, treatment, and follow-up per orders, current med list, and patient instructions.       rolling walker